# Patient Record
Sex: MALE | Race: WHITE | Employment: UNEMPLOYED | URBAN - METROPOLITAN AREA
[De-identification: names, ages, dates, MRNs, and addresses within clinical notes are randomized per-mention and may not be internally consistent; named-entity substitution may affect disease eponyms.]

---

## 2017-02-02 ENCOUNTER — TELEPHONE (OUTPATIENT)
Dept: FAMILY MEDICINE | Facility: CLINIC | Age: 62
End: 2017-02-02

## 2017-02-02 DIAGNOSIS — G47.33 OSA (OBSTRUCTIVE SLEEP APNEA): ICD-10-CM

## 2017-02-02 DIAGNOSIS — G25.81 RESTLESS LEGS SYNDROME (RLS): Primary | ICD-10-CM

## 2017-02-02 NOTE — TELEPHONE ENCOUNTER
Reason for Call: Request for an order or referral:    Order or referral being requested: CPAP supplies    Date needed: as soon as possible    Has the patient been seen by the PCP for this problem? YES    Additional comments: Please fax order to [facility] [phone]    Phone number Patient can be reached at:  Home number on file [phone] (home)    Best Time:  any    Can we leave a detailed message on this number?  YES    Call taken on 2/2/2017 at 3:32 PM by KELY SANDHU

## 2017-02-03 NOTE — TELEPHONE ENCOUNTER
CPAP SUPPLIES    ICD-10-CM    1. Restless legs syndrome (RLS) G25.81    2. DALTON (obstructive sleep apnea)- mild (AHI 5.9) G47.33

## 2017-02-06 ENCOUNTER — TELEPHONE (OUTPATIENT)
Dept: FAMILY MEDICINE | Facility: CLINIC | Age: 62
End: 2017-02-06

## 2017-02-06 ENCOUNTER — OFFICE VISIT (OUTPATIENT)
Dept: FAMILY MEDICINE | Facility: CLINIC | Age: 62
End: 2017-02-06
Payer: COMMERCIAL

## 2017-02-06 VITALS
SYSTOLIC BLOOD PRESSURE: 124 MMHG | BODY MASS INDEX: 35.19 KG/M2 | TEMPERATURE: 97.4 F | RESPIRATION RATE: 16 BRPM | HEIGHT: 65 IN | DIASTOLIC BLOOD PRESSURE: 74 MMHG | WEIGHT: 211.2 LBS | OXYGEN SATURATION: 98 % | HEART RATE: 98 BPM

## 2017-02-06 DIAGNOSIS — G47.33 OSA (OBSTRUCTIVE SLEEP APNEA): ICD-10-CM

## 2017-02-06 DIAGNOSIS — G89.4 CHRONIC PAIN SYNDROME: Primary | ICD-10-CM

## 2017-02-06 DIAGNOSIS — M50.30 DEGENERATION OF CERVICAL INTERVERTEBRAL DISC: Chronic | ICD-10-CM

## 2017-02-06 DIAGNOSIS — M25.562 CHRONIC PAIN OF BOTH KNEES: Chronic | ICD-10-CM

## 2017-02-06 DIAGNOSIS — E78.5 HYPERLIPIDEMIA WITH TARGET LDL LESS THAN 130: Chronic | ICD-10-CM

## 2017-02-06 DIAGNOSIS — F41.8 MIXED ANXIETY DEPRESSIVE DISORDER: ICD-10-CM

## 2017-02-06 DIAGNOSIS — M51.379 DEGENERATION OF LUMBAR OR LUMBOSACRAL INTERVERTEBRAL DISC: ICD-10-CM

## 2017-02-06 DIAGNOSIS — M25.561 CHRONIC PAIN OF BOTH KNEES: Chronic | ICD-10-CM

## 2017-02-06 DIAGNOSIS — F51.01 PRIMARY INSOMNIA: ICD-10-CM

## 2017-02-06 DIAGNOSIS — M54.2 CERVICALGIA: ICD-10-CM

## 2017-02-06 DIAGNOSIS — G89.29 CHRONIC PAIN OF BOTH KNEES: Chronic | ICD-10-CM

## 2017-02-06 PROCEDURE — 99214 OFFICE O/P EST MOD 30 MIN: CPT | Performed by: FAMILY MEDICINE

## 2017-02-06 RX ORDER — HYDROCODONE BITARTRATE AND ACETAMINOPHEN 7.5; 325 MG/1; MG/1
1 TABLET ORAL EVERY 4 HOURS PRN
Qty: 100 TABLET | Refills: 0 | Status: SHIPPED | OUTPATIENT
Start: 2017-02-06 | End: 2017-02-06

## 2017-02-06 RX ORDER — HYDROCODONE BITARTRATE AND ACETAMINOPHEN 7.5; 325 MG/1; MG/1
1 TABLET ORAL EVERY 4 HOURS PRN
Qty: 100 TABLET | Refills: 0 | Status: SHIPPED | OUTPATIENT
Start: 2017-03-06 | End: 2017-02-06

## 2017-02-06 RX ORDER — SAW/PYGEUM/BETA/HERB/D3/B6/ZN 30 MG-25MG
1 CAPSULE ORAL EVERY EVENING
Qty: 30 TABLET | Refills: 11 | Status: SHIPPED | OUTPATIENT
Start: 2017-02-06 | End: 2019-05-09

## 2017-02-06 RX ORDER — HYDROCODONE BITARTRATE AND ACETAMINOPHEN 7.5; 325 MG/1; MG/1
1 TABLET ORAL EVERY 4 HOURS PRN
Qty: 100 TABLET | Refills: 0 | Status: SHIPPED | OUTPATIENT
Start: 2017-04-06 | End: 2017-05-08

## 2017-02-06 RX ORDER — TRAZODONE HYDROCHLORIDE 50 MG/1
100 TABLET, FILM COATED ORAL
Qty: 60 TABLET | Refills: 11 | Status: SHIPPED | OUTPATIENT
Start: 2017-02-06 | End: 2017-08-06

## 2017-02-06 NOTE — PROGRESS NOTES
"  SUBJECTIVE:                                                    Claude D Feinstein is a 61 year old male who presents to clinic today for the following health issues:        Chronic Pain Follow-Up       Type / Location of Pain: back,   Analgesia/pain control:       Recent changes:  worse      Overall control: Tolerable with discomfort  Activity level/function:      Daily activities:  Can do most things most days, with some rest    Work:  not applicable  Adverse effects:  No  Adherance    Taking medication as directed?  Yes    Participating in other treatments: not applicable  Risk Factors:    Sleep:  Poor    Mood/anxiety:  controlled    Recent family or social stressors:  none noted    Other aggravating factors: none  PHQ-9 SCORE 11/3/2015 5/9/2016 11/7/2016   Total Score - - -   Total Score 0 0 0     RAKESH-7 SCORE 11/3/2015 11/7/2016   Total Score 0 0     Encounter-Level CSA - 9/8/15:               Controlled Substance Agreement - Scan on 9/16/2015 11:59 AM : [facility], Controlled Substance Contract, 09-08-15 (below)                 Amount of exercise or physical activity: 2-3 days/week for an average of 30-45 minutes, not going as often due to less pain meds    Problems taking medications regularly: No    Medication side effects: none    Diet: regular (no restrictions)    CERVICAL DISC DISEASE ONGOING    RESTLESS LEG SYNDROME IMPROVED     ITCHING IMPROVED     LUMBAR DISC SEVERE AT TIMES INTERMITTENT     CARPAL TUNNEL SYNDROME IMPROVED     OBESITY MARKEDLY IMPROVED 1400 CALORY DIET     OSTEOARTHRITIS KNEE AND TOTAL KNEE ARTHROPLASTY BILATERAL MARKED IMPROVED PAIN     OBSTRUCTIVE SLEEP APNEA ONGOING SYMPTOMS     DEPRESSION IMPROVED  ANXIETY IMPROVED     INSOMNIA IMPROVED     LDL OR \"BAD\" CHOLESTEROL  GOAL < 100 IMPROVED               Problem list and histories reviewed & adjusted, as indicated.  Additional history: as documented      Patient Active Problem List   Diagnosis     Degeneration of cervical intervertebral disc "     Restless legs syndrome (RLS)     Pruritic disorder     Degeneration of lumbar or lumbosacral intervertebral disc     Carpal tunnel syndrome     Obesity     External hemorrhoids     Primary localized osteoarthrosis, lower leg     Prurigo     Illiterate     DALTON (obstructive sleep apnea)- mild (AHI 5.9)     Hyperlipidemia with target LDL less than 130     Knee pain     Major depression, recurrent (H)     Major depressive disorder, recurrent episode, moderate (H)     Adjustment disorder with depressed mood     Osteoarthritis of knee     Neck pain     Lumbago     Major depression in partial remission (H)     Mixed anxiety depressive disorder     Advance Care Planning     Anxiety     Chronic pain syndrome     Other chronic pain     Midline low back pain with sciatica, sciatica laterality unspecified     Status post total bilateral knee replacement     Simple chronic bronchitis (H)     Psychophysiologic insomnia     Rash     Past Surgical History   Procedure Laterality Date     Foot surgery  1991     L bunionectomy     Knee surgery  1991     R Knee Arthroscopy     Ankle surgery  7/16/2007     synovectomy     Orthopedic surgery  1990     arm surgery laceration R forearm     Carpal tunnel release rt/lt       Head & neck surgery  1958     plate in head       Social History   Substance Use Topics     Smoking status: Former Smoker -- 1.00 packs/day     Types: Cigarettes     Quit date: 03/10/2015     Smokeless tobacco: Never Used     Alcohol Use: Yes     Family History   Problem Relation Age of Onset     Alzheimer Disease Mother          Current Outpatient Prescriptions   Medication Sig Dispense Refill     traZODone (DESYREL) 50 MG tablet Take 2 tablets (100 mg) by mouth nightly as needed for sleep 60 tablet 11     Melatonin (MELATONIN TR) 10 MG TBCR Take 1 tablet by mouth every evening 30 tablet 11     [START ON 4/6/2017] HYDROcodone-acetaminophen (NORCO) 7.5-325 MG per tablet Take 1 tablet by mouth every 4 hours as needed  for pain maximum 3-4 tablet(s) per day 100 tablet 0     lidocaine (ASPERCREME W/LIDOCAINE) 4 % CREA 4% topical cream Apply topically once as needed for mild pain 120 g 11     albuterol (2.5 MG/3ML) 0.083% nebulizer solution NEBULIZE AND INHALE 1 VIAL EVERY 6 HOURS AS NEEDED FOR SHORTNESS OF BREATH/DYSPNEA OR WHEEZING. 360 mL 1     albuterol (PROAIR HFA, PROVENTIL HFA, VENTOLIN HFA) 108 (90 BASE) MCG/ACT inhaler Inhale 2 puffs into the lungs every 6 hours as needed for shortness of breath / dyspnea or wheezing 1 Inhaler 11     cyclobenzaprine (FLEXERIL) 5 MG tablet Take 1 tablet (5 mg) by mouth 2 times daily as needed for muscle spasms 60 tablet 3     triamcinolone (KENALOG) 0.1 % cream APPLY SPARINGLY TO THE AFFECTED AREA THREE TIMES A DAY AS NEEDED. 30 g 0     ramelteon (ROZEREM) 8 MG tablet Take 1 tablet (8 mg) by mouth At Bedtime 30 tablet 11     tiotropium (SPIRIVA HANDIHALER) 18 MCG inhalation capsule Inhale contents of one capsule daily. 90 capsule 3     fluticasone (FLONASE) 50 MCG/ACT nasal spray INSTILL 1 TO 2 SPRAYS IN EACH NOSTRIL DAILY. 15 g 11     carbidopa-levodopa (SINEMET)  MG per tablet Take 1 tablet by mouth At Bedtime 90 tablet 3     hydrOXYzine (VISTARIL) 25 MG capsule Take 1 capsule (25 mg) by mouth 3 times daily as needed for itching 270 capsule 3     simvastatin (ZOCOR) 10 MG tablet Take 1 tablet (10 mg) by mouth At Bedtime 30 tablet 11     ketoconazole (NIZORAL) 2 % cream APPLY TOPICALLY TWICE DAILY. 30 g 2     Skin Protectants, Misc. (EUCERIN) cream Apply topically as needed for dry skin 454 g G     mineral oil-white petrolatum (MINERIN, EUCERIN) CREA Apply topically two times daily 454 g 11     ORDER FOR DME Equipment being ordered: LONGITUDINAL ARCH SUPPORTS   DIAGNOSIS PAINFUL ANKLES AND KNEES WITH OSTEOARTHRTIS ADVANCE  ONE PAIR USE AS DIRECTED 1 Device 1     ORDER FOR DME autocpap 6-10 cm       Cholecalciferol (VITAMIN D) 2000 UNITS CAPS Take 1 capsule by mouth daily.        Magnesium Hydroxide (MAGNESIA PO) Take  by mouth.       Saw Palmetto, Serenoa repens, (SAW PALMETTO FRUIT PO) Take  by mouth.       [DISCONTINUED] traZODone (DESYREL) 50 MG tablet Take 2 tablets (100 mg) by mouth nightly as needed for sleep 60 tablet 3     No Known Allergies  Recent Labs   Lab Test  08/01/16   1418  03/17/15   1333   06/11/13   1427  01/29/13   1211   05/25/12   1646   A1C  5.4   --    --   5.8   --    --    --    LDL  66   --    --   55  71   --   90.2   HDL  44   --    --   47  50   --   53   TRIG  163*   --    --   151*  121   --   189*   ALT  56   --    --    --   40   --   86.0*   CR  0.88  1.00   --   0.90  0.90   --   0.8   GFRESTIMATED  89  77   --   88  88   < >   --    GFRESTBLACK  >90  >90   --   >90  >90   < >   --    POTASSIUM  3.9  4.3   < >  4.1  4.0   --   4.3   TSH   --    --    --   2.73   --    --    --     < > = values in this interval not displayed.      BP Readings from Last 3 Encounters:   02/06/17 124/74   11/07/16 128/70   08/01/16 118/72    Wt Readings from Last 3 Encounters:   02/06/17 211 lb 3.2 oz (95.8 kg)   11/07/16 233 lb (105.688 kg)   08/01/16 248 lb (112.492 kg)                  Labs reviewed in EPIC  Problem list, Medication list, Allergies, and Medical/Social/Surgical histories reviewed in [facility] and updated as appropriate.    ROS: has Degeneration of cervical intervertebral disc; Restless legs syndrome (RLS); Pruritic disorder; Degeneration of lumbar or lumbosacral intervertebral disc; Carpal tunnel syndrome; Obesity; External hemorrhoids; Primary localized osteoarthrosis, lower leg; Prurigo; Illiterate; DALTON (obstructive sleep apnea)- mild (AHI 5.9); Hyperlipidemia with target LDL less than 130; Knee pain; Major depression, recurrent (H); Major depressive disorder, recurrent episode, moderate (H); Adjustment disorder with depressed mood; Osteoarthritis of knee; Neck pain; Lumbago; Major depression in partial remission (H); Mixed anxiety depressive disorder;  "Advance Care Planning; Anxiety; Chronic pain syndrome; Other chronic pain; Midline low back pain with sciatica, sciatica laterality unspecified; Status post total bilateral knee replacement; Simple chronic bronchitis (H); Psychophysiologic insomnia; and Rash on his problem list.   C: NEGATIVE for fever, chills, change in weight  I: NEGATIVE for worrisome rashes, moles or lesions  E: NEGATIVE for vision changes or irritation  E/M: NEGATIVE for ear, mouth and throat problems  R: NEGATIVE for significant cough or SOB  B: NEGATIVE for masses, tenderness or discharge  CV: NEGATIVE for chest pain, palpitations or peripheral edema  GI: NEGATIVE for nausea, abdominal pain, heartburn, or change in bowel habits  : NEGATIVE for frequency, dysuria, or hematuria  MUSCULOSKELETAL: SEE HISTORY OF PRESENT ILLNESS   KNEE PAIN IMPROVED   ONGOING BACK AND NECK PAIN  N: NEGATIVE for weakness, dizziness or paresthesias  E: NEGATIVE for temperature intolerance, skin/hair changes  H: NEGATIVE for bleeding problems  P: NEGATIVE for changes in mood or affect    OBJECTIVE:                                                    /74 mmHg  Pulse 98  Temp(Src) 97.4  F (36.3  C) (Tympanic)  Resp 16  Ht 5' 5\" (1.651 m)  Wt 211 lb 3.2 oz (95.8 kg)  BMI 35.15 kg/m2  SpO2 98%  Body mass index is 35.15 kg/(m^2).  GENERAL: healthy, alert and no distress  EYES: Eyes grossly normal to inspection, PERRL and conjunctivae and sclerae normal  HENT: ear canals and TM's normal, nose and mouth without ulcers or lesions  NECK: no adenopathy, no asymmetry, masses, or scars and thyroid normal to palpation  RESP: lungs clear to auscultation - no rales, rhonchi or wheezes  CV: regular rate and rhythm, normal S1 S2, no S3 or S4, no murmur, click or rub, no peripheral edema and peripheral pulses strong  ABDOMEN: soft, nontender, no hepatosplenomegaly, no masses and bowel sounds normal  MS: no gross musculoskeletal defects noted, no edema  NEURO: Normal " strength and tone, mentation intact and speech normal  BACK: no CVA tenderness, no paralumbar tenderness    Diagnostic Test Results:  Results for orders placed or performed in visit on 11/21/16   Fecal colorectal cancer screen FIT - Future (S+30)   Result Value Ref Range    Occult Blood Scn FIT Negative NEG        ASSESSMENT/PLAN:                                                          ICD-10-CM    1. Chronic pain syndrome G89.4    2. Primary insomnia F51.01 traZODone (DESYREL) 50 MG tablet     Melatonin (MELATONIN TR) 10 MG TBCR   3. Degeneration of cervical intervertebral disc M50.30    4. Hyperlipidemia with target LDL less than 130 E78.5    5. Chronic pain of both knees M25.561     M25.562     G89.29    6. DALTON (obstructive sleep apnea)- mild (AHI 5.9) G47.33    7. Mixed anxiety depressive disorder F41.8    8. Degeneration of lumbar or lumbosacral intervertebral disc M51.37 HYDROcodone-acetaminophen (NORCO) 7.5-325 MG per tablet     DISCONTINUED: HYDROcodone-acetaminophen (NORCO) 7.5-325 MG per tablet     DISCONTINUED: HYDROcodone-acetaminophen (NORCO) 7.5-325 MG per tablet     DISCONTINUED: HYDROcodone-acetaminophen (NORCO) 7.5-325 MG per tablet   9. Cervicalgia M54.2 HYDROcodone-acetaminophen (NORCO) 7.5-325 MG per tablet     DISCONTINUED: HYDROcodone-acetaminophen (NORCO) 7.5-325 MG per tablet     DISCONTINUED: HYDROcodone-acetaminophen (NORCO) 7.5-325 MG per tablet     DISCONTINUED: HYDROcodone-acetaminophen (NORCO) 7.5-325 MG per tablet       There are no Patient Instructions on file for this visit.    JEANNIE LO MD  [facility]

## 2017-02-06 NOTE — MR AVS SNAPSHOT
"              After Visit Summary   2/6/2017    Claude D [facility]    MRN: [id number]           Patient Information     Date Of Birth          1955        Visit Information        Provider Department      2/6/2017 1:00 PM Addison Franks MD [facility]        Today's Diagnoses     Chronic pain syndrome    -  1     Primary insomnia         Degeneration of cervical intervertebral disc         Hyperlipidemia with target LDL less than 130         Chronic pain of both knees         DALTON (obstructive sleep apnea)- mild (AHI 5.9)         Mixed anxiety depressive disorder            Follow-ups after your visit        Who to contact     If you have questions or need follow up information about today's clinic visit or your schedule please contact [facility] directly at [phone].  Normal or non-critical lab and imaging results will be communicated to you by [employer], letter or phone within 4 business days after the clinic has received the results. If you do not hear from us within 7 days, please contact the clinic through [employer] or phone. If you have a critical or abnormal lab result, we will notify you by phone as soon as possible.  Submit refill requests through [employer] or call your pharmacy and they will forward the refill request to us. Please allow 3 business days for your refill to be completed.          Additional Information About Your Visit        [employer] Information     [employer] lets you send messages to your doctor, view your test results, renew your prescriptions, schedule appointments and more. To sign up, go to [email or web address] . Click on \"Log in\" on the left side of the screen, which will take you to the Welcome page. Then click on \"Sign up Now\" on the right side of the page.     You will be asked to enter the access code listed below, as well as some personal information. Please follow the directions to create your " "username and password.     Your access code is: [email or web address]  Expires: 2017  1:33 PM     Your access code will  in 90 days. If you need help or a new code, please call your [facility] or [phone].        Care EveryWhere ID     This is your Care EveryWhere ID. This could be used by other organizations to access your [facility] medical records  [id number]        Your Vitals Were     Pulse Temperature Respirations Height BMI (Body Mass Index) Pulse Oximetry    98 97.4  F (36.3  C) (Tympanic) 16 5' 5\" (1.651 m) 35.15 kg/m2 98%       Blood Pressure from Last 3 Encounters:   17 124/74   16 128/70   16 118/72    Weight from Last 3 Encounters:   17 211 lb 3.2 oz (95.8 kg)   16 233 lb (105.688 kg)   16 248 lb (112.492 kg)              Today, you had the following     No orders found for display         Today's Medication Changes          These changes are accurate as of: 17  1:33 PM.  If you have any questions, ask your nurse or doctor.               Start taking these medicines.        Dose/Directions    Melatonin 10 MG Tbcr   Commonly known as:  MELATONIN TR   Used for:  Primary insomnia   Started by:  Addison Franks MD        Dose:  1 tablet   Take 1 tablet by mouth every evening   Quantity:  30 tablet   Refills:  11         Stop taking these medicines if you haven't already. Please contact your care team if you have questions.     HYDROcodone-acetaminophen 7.5-325 MG per tablet   Commonly known as:  NORCO   Stopped by:  Addison Franks MD           order for DME   Stopped by:  Addison Franks MD                Where to get your medicines      These medications were sent to [facility] - [place] - [address]  [address] [address], [place] [address]     Phone:  [phone]    - Melatonin 10 MG Tbcr  - traZODone 50 MG tablet             Primary Care Provider Office Phone # Fax #    " Addison Franks [phone]       [facility] [address]  [facility] [address]        Thank you!     Thank you for choosing [facility]  for your care. Our goal is always to provide you with excellent care. Hearing back from our patients is one way we can continue to improve our services. Please take a few minutes to complete the written survey that you may receive in the mail after your visit with us. Thank you!             Your Updated Medication List - Protect others around you: Learn how to safely use, store and throw away your medicines at www.disposemymeds.org.          This list is accurate as of: 2/6/17  1:33 PM.  Always use your most recent med list.                   Brand Name Dispense Instructions for use    * albuterol (2.5 MG/3ML) 0.083% neb solution     360 mL    NEBULIZE AND INHALE 1 VIAL EVERY 6 HOURS AS NEEDED FOR SHORTNESS OF BREATH/DYSPNEA OR WHEEZING.       * albuterol 108 (90 BASE) MCG/ACT Inhaler    PROAIR HFA/PROVENTIL HFA/VENTOLIN HFA    1 Inhaler    Inhale 2 puffs into the lungs every 6 hours as needed for shortness of breath / dyspnea or wheezing       carbidopa-levodopa  MG per tablet    SINEMET    90 tablet    Take 1 tablet by mouth At Bedtime       cyclobenzaprine 5 MG tablet    FLEXERIL    60 tablet    Take 1 tablet (5 mg) by mouth 2 times daily as needed for muscle spasms       fluticasone 50 MCG/ACT spray    FLONASE    15 g    INSTILL 1 TO 2 SPRAYS IN EACH NOSTRIL DAILY.       hydrOXYzine 25 MG capsule    VISTARIL    270 capsule    Take 1 capsule (25 mg) by mouth 3 times daily as needed for itching       ketoconazole 2 % cream    NIZORAL    30 g    APPLY TOPICALLY TWICE DAILY.       lidocaine 4 % Crea cream    ASPERCREME W/LIDOCAINE    120 g    Apply topically once as needed for mild pain       MAGNESIA PO      Take  by mouth.       Melatonin 10 MG Tbcr    MELATONIN TR    30 tablet    Take 1 tablet  by mouth every evening       * mineral oil-white petrolatum Crea cream     454 g    Apply topically two times daily       * eucerin cream     454 g    Apply topically as needed for dry skin       * order for DME      autocpap 6-10 cm       * order for DME     1 Device    Equipment being ordered: LONGITUDINAL ARCH SUPPORTS   DIAGNOSIS PAINFUL ANKLES AND KNEES WITH OSTEOARTHRTIS ADVANCE  ONE PAIR USE AS DIRECTED       ramelteon 8 MG tablet    ROZEREM    30 tablet    Take 1 tablet (8 mg) by mouth At Bedtime       SAW PALMETTO FRUIT PO      Take  by mouth.       simvastatin 10 MG tablet    ZOCOR    30 tablet    Take 1 tablet (10 mg) by mouth At Bedtime       tiotropium 18 MCG capsule    SPIRIVA HANDIHALER    90 capsule    Inhale contents of one capsule daily.       traZODone 50 MG tablet    DESYREL    60 tablet    Take 2 tablets (100 mg) by mouth nightly as needed for sleep       triamcinolone 0.1 % cream    KENALOG    30 g    APPLY SPARINGLY TO THE AFFECTED AREA THREE TIMES A DAY AS NEEDED.       vitamin D 2000 UNITS Caps      Take 1 capsule by mouth daily.       * Notice:  This list has 6 medication(s) that are the same as other medications prescribed for you. Read the directions carefully, and ask your doctor or other care provider to review them with you.

## 2017-02-06 NOTE — NURSING NOTE
"Chief Complaint   Patient presents with     Back Pain       Initial /74 mmHg  Pulse 98  Temp(Src) 97.4  F (36.3  C) (Tympanic)  Resp 16  Ht 5' 5\" (1.651 m)  Wt 211 lb 3.2 oz (95.8 kg)  BMI 35.15 kg/m2  SpO2 98% Estimated body mass index is 35.15 kg/(m^2) as calculated from the following:    Height as of this encounter: 5' 5\" (1.651 m).    Weight as of this encounter: 211 lb 3.2 oz (95.8 kg).  Medication Reconciliation: complete   Lisandra Simon CMA      "

## 2017-02-07 NOTE — PATIENT INSTRUCTIONS
(G89.4) Chronic pain syndrome  (primary encounter diagnosis)    Comment:      Plan:          (F51.01) Primary insomnia    Comment:      Plan: traZODone (DESYREL) 50 MG tablet, Melatonin           (MELATONIN TR) 10 MG TBCR                   (M50.30) Degeneration of cervical intervertebral disc    Comment:      Plan:          (E78.5) Hyperlipidemia with target LDL less than 130    Comment:      Plan:          (M25.561,  M25.562,  G89.29) Chronic pain of both knees    Comment:      Plan:          (G47.33) DALTON (obstructive sleep apnea)- mild (AHI 5.9)    Comment:      Plan:          (F41.8) Mixed anxiety depressive disorder    Comment:      Plan:          (M51.37) Degeneration of lumbar or lumbosacral intervertebral disc    Comment:      Plan: HYDROcodone-acetaminophen (NORCO) 7.5-325 MG           per tablet, DISCONTINUED:           HYDROcodone-acetaminophen (NORCO) 7.5-325 MG           per tablet, DISCONTINUED:           HYDROcodone-acetaminophen (NORCO) 7.5-325 MG           per tablet, DISCONTINUED:               (M54.2) Cervicalgia    Comment:  SEE ABOVE    Plan ROM EXERCISES    NECK AND LOWER BACK PAIN

## 2017-04-10 ENCOUNTER — MEDICAL CORRESPONDENCE (OUTPATIENT)
Dept: HEALTH INFORMATION MANAGEMENT | Facility: CLINIC | Age: 62
End: 2017-04-10

## 2017-04-12 DIAGNOSIS — G89.29 CHRONIC LEFT-SIDED LOW BACK PAIN WITHOUT SCIATICA: ICD-10-CM

## 2017-04-12 DIAGNOSIS — M54.50 CHRONIC LEFT-SIDED LOW BACK PAIN WITHOUT SCIATICA: ICD-10-CM

## 2017-04-12 NOTE — TELEPHONE ENCOUNTER
cyclobenzaprine (FLEXERIL) 5 MG tablet      Last Written Prescription Date:  11/7/16  Last Fill Quantity: 60,   # refills: 3  Last Office Visit with [facility], [facility] or [facility] prescribing provider: 2/6/17  Future Office visit:    Next 5 appointments (look out 90 days)     May 08, 2017  1:00 PM CDT   [facility]    [address] [place] [id number] [phone]                   Routing refill request to provider for review/approval because:  Drug not on the [facility], [facility] or [facility] refill protocol or controlled substance

## 2017-04-13 RX ORDER — CYCLOBENZAPRINE HCL 5 MG
TABLET ORAL
Qty: 60 TABLET | Refills: 3 | Status: SHIPPED | OUTPATIENT
Start: 2017-04-13 | End: 2017-10-19

## 2017-05-08 ENCOUNTER — OFFICE VISIT (OUTPATIENT)
Dept: FAMILY MEDICINE | Facility: CLINIC | Age: 62
End: 2017-05-08
Payer: COMMERCIAL

## 2017-05-08 ENCOUNTER — TELEPHONE (OUTPATIENT)
Dept: FAMILY MEDICINE | Facility: CLINIC | Age: 62
End: 2017-05-08

## 2017-05-08 VITALS
BODY MASS INDEX: 30.37 KG/M2 | RESPIRATION RATE: 16 BRPM | SYSTOLIC BLOOD PRESSURE: 124 MMHG | WEIGHT: 182.5 LBS | HEART RATE: 112 BPM | OXYGEN SATURATION: 95 % | TEMPERATURE: 97.3 F | DIASTOLIC BLOOD PRESSURE: 64 MMHG

## 2017-05-08 DIAGNOSIS — M54.9 UPPER BACK PAIN: ICD-10-CM

## 2017-05-08 DIAGNOSIS — M54.2 CERVICALGIA: ICD-10-CM

## 2017-05-08 DIAGNOSIS — M50.30 DEGENERATION OF CERVICAL INTERVERTEBRAL DISC: Chronic | ICD-10-CM

## 2017-05-08 DIAGNOSIS — Z96.653 STATUS POST TOTAL BILATERAL KNEE REPLACEMENT: Primary | ICD-10-CM

## 2017-05-08 DIAGNOSIS — M51.379 DEGENERATION OF LUMBAR OR LUMBOSACRAL INTERVERTEBRAL DISC: ICD-10-CM

## 2017-05-08 DIAGNOSIS — G89.4 CHRONIC PAIN SYNDROME: ICD-10-CM

## 2017-05-08 PROCEDURE — 99000 SPECIMEN HANDLING OFFICE-LAB: CPT | Performed by: FAMILY MEDICINE

## 2017-05-08 PROCEDURE — 99214 OFFICE O/P EST MOD 30 MIN: CPT | Performed by: FAMILY MEDICINE

## 2017-05-08 PROCEDURE — 80307 DRUG TEST PRSMV CHEM ANLYZR: CPT | Mod: 90 | Performed by: FAMILY MEDICINE

## 2017-05-08 RX ORDER — HYDROCODONE BITARTRATE AND ACETAMINOPHEN 7.5; 325 MG/1; MG/1
1 TABLET ORAL EVERY 4 HOURS PRN
Qty: 100 TABLET | Refills: 0 | Status: SHIPPED | OUTPATIENT
Start: 2017-06-08 | End: 2017-05-08

## 2017-05-08 RX ORDER — HYDROCODONE BITARTRATE AND ACETAMINOPHEN 7.5; 325 MG/1; MG/1
1 TABLET ORAL EVERY 4 HOURS PRN
Qty: 100 TABLET | Refills: 0 | Status: SHIPPED | OUTPATIENT
Start: 2017-07-08 | End: 2017-08-07

## 2017-05-08 RX ORDER — HYDROCODONE BITARTRATE AND ACETAMINOPHEN 7.5; 325 MG/1; MG/1
1 TABLET ORAL EVERY 4 HOURS PRN
Qty: 100 TABLET | Refills: 0 | Status: SHIPPED | OUTPATIENT
Start: 2017-05-08 | End: 2017-05-08

## 2017-05-08 NOTE — LETTER
[address]  [address]  [facility] [address]  [phone]                                                                                                           Claude D [facility]  [address] [place] [address]    May 15, 2017      Dear Claude,    The results of your recent tests were reviewed and are enclosed.     Results for orders placed or performed in visit on 05/08/17   Pain Drug Scr UR W Rptd Meds   Result Value Ref Range    Pain Drug SCR UR W RPTD Meds       FINAL  (Note)  ====================================================================  TOXASSURE COMP DRUG ANALYSIS,UR  ====================================================================  Test                             Result       Flag       Units    Drug Present   Carboxy-THC                    7                       ng/mg creat    Carboxy-THC is a metabolite of tetrahydrocannabinol  (THC).    Source of THC is most commonly illicit, but THC is also present    in a scheduled prescription medication.     Hydrocodone                    1425                    ng/mg creat   Hydromorphone                  270                     ng/mg creat   Dihydrocodeine                 106                     ng/mg creat   Norhydrocodone                 2140                    ng/mg creat    Sources of hydrocodone include scheduled prescription    medications. Hydromorphone, dihydrocodeine and norhydrocodone are    expected metabolites of hydrocodone. Hydromorphone and    dihydrocodeine are also av ailable as scheduled prescription    medications.     Cyclobenzaprine                PRESENT   Trazodone                      PRESENT   1,3 chlorophenyl piperazine    PRESENT    1,3-chlorophenyl piperazine is an expected metabolite of    trazodone.     Acetaminophen                  PRESENT   Hydroxyzine                     PRESENT  ====================================================================  Test                      Result    Flag   Units      Ref Range   Creatinine              151              mg/dL      >=20  ====================================================================  Declared Medications:  Medication list was not provided.  ====================================================================  For clinical consultation, please call [phone].  ====================================================================  Analysis performed by [facility] [place] [address]       TRACE AMOUNT OF MARIJUANA ON URINE ANALYSIS.   REST OF MEDICATIONS PRESENT AS EXPECTED     Thank you for choosing [facility].  We appreciate the opportunity to serve you and look forward to supporting your healthcare needs in the future.    If you have any questions or concerns, please call me or my staff at [phone].      Sincerely,    Addison Franks Jr MD

## 2017-05-08 NOTE — TELEPHONE ENCOUNTER
Patient said during the appointment that Dr. Franks mentioned a medication for concentration for the patient. He is calling to ask what that is and if Dr. Franks plans on prescribing anything at this time.

## 2017-05-08 NOTE — TELEPHONE ENCOUNTER
Reason for Call:  Other prescription  Detailed comments: please call patient, has question about his visit today 05/08/2017  Phone Number Patient can be reached at: Home number on file [phone] (home)  Best Time: any  Can we leave a detailed message on this number? YES  Call taken on 5/8/2017 at 3:15 PM by JB DAMON

## 2017-05-08 NOTE — LETTER
My Depression Action Plan  Name: Claude D Feinstein   Date of Birth 1955  Date: 5/8/2017    My doctor: Addison Franks   My clinic: [address]  [facility] [id number] [phone]          GREEN    ZONE   Good Control    What it looks like:     Things are going generally well. You have normal up s and down s. You may even feel depressed from time to time, but bad moods usually last less than a day.   What you need to do:  1. Continue to care for yourself (see self care plan)  2. Check your depression survival kit and update it as needed  3. Follow your physician s recommendations including any medication.  4. Do not stop taking medication unless you consult with your physician first.           YELLOW         ZONE Getting Worse    What it looks like:     Depression is starting to interfere with your life.     It may be hard to get out of bed; you may be starting to isolate yourself from others.    Symptoms of depression are starting to last most all day and this has happened for several days.     You may have suicidal thoughts but they are not constant.   What you need to do:     1. Call your care team, your response to treatment will improve if you keep your care team informed of your progress. Yellow periods are signs an adjustment may need to be made.     2. Continue your self-care, even if you have to fake it!    3. Talk to someone in your support network    4. Open up your depression survival kit           RED    ZONE Medical Alert - Get Help    What it looks like:     Depression is seriously interfering with your life.     You may experience these or other symptoms: You can t get out of bed most days, can t work or engage in other necessary activities, you have trouble taking care of basic hygiene, or basic responsibilities, thoughts of suicide or death that will not go away, self-injurious behavior.     What you  need to do:  1. Call your care team and request a same-day appointment. If they are not available (weekends or after hours) call your local crisis line, emergency room or 911.      Electronically signed by: Lisandra Simon, May 8, 2017    Depression Self Care Plan / Survival Kit    Self-Care for Depression  Here s the deal. Your body and mind are really not as separate as most people think.  What you do and think affects how you feel and how you feel influences what you do and think. This means if you do things that people who feel good do, it will help you feel better.  Sometimes this is all it takes.  There is also a place for medication and therapy depending on how severe your depression is, so be sure to consult with your medical provider and/ or Behavioral Health Consultant if your symptoms are worsening or not improving.     In order to better manage my stress, I will:    Exercise  Get some form of exercise, every day. This will help reduce pain and release endorphins, the  feel good  chemicals in your brain. This is almost as good as taking antidepressants!  This is not the same as joining a gym and then never going! (they count on that by the way ) It can be as simple as just going for a walk or doing some gardening, anything that will get you moving.      Hygiene   Maintain good hygiene (Get out of bed in the morning, Make your bed, Brush your teeth, Take a shower, and Get dressed like you were going to work, even if you are unemployed).  If your clothes don't fit try to get ones that do.    Diet  I will strive to eat foods that are good for me, drink plenty of water, and avoid excessive sugar, caffeine, alcohol, and other mood-altering substances.  Some foods that are helpful in depression are: complex carbohydrates, B vitamins, flaxseed, fish or fish oil, fresh fruits and vegetables.    Psychotherapy  I agree to participate in Individual Therapy (if recommended).    Medication  If prescribed  medications, I agree to take them.  Missing doses can result in serious side effects.  I understand that drinking alcohol, or other illicit drug use, may cause potential side effects.  I will not stop my medication abruptly without first discussing it with my provider.    Staying Connected With Others  I will stay in touch with my friends, family members, and my primary care provider/team.    Use your imagination  Be creative.  We all have a creative side; it doesn t matter if it s oil painting, sand castles, or mud pies! This will also kick up the endorphins.    Witness Beauty  (AKA stop and smell the roses) Take a look outside, even in mid-winter. Notice colors, textures. Watch the squirrels and birds.     Service to others  Be of service to others.  There is always someone else in need.  By helping others we can  get out of ourselves  and remember the really important things.  This also provides opportunities for practicing all the other parts of the program.    Humor  Laugh and be silly!  Adjust your TV habits for less news and crime-drama and more comedy.    Control your stress  Try breathing deep, massage therapy, biofeedback, and meditation. Find time to relax each day.     My support system    Clinic Contact:  Phone number:    Contact 1:  Phone number:    Contact 2:  Phone number:    Restorationist/:  Phone number:    Therapist:  Phone number:    [facility] crisis center:    Phone number:    Other community support:  Phone number:

## 2017-05-08 NOTE — PROGRESS NOTES
SUBJECTIVE:                                                    Claude D Feinstein is a 61 year old male who presents to clinic today for the following health issues:  Health Maintenance Due   Topic Date Due     URINE DRUG SCREEN Q1 YR  [birth year]02/1970     ASTHMA CONTROL TEST Q6 MOS (NO INBASKET)  05/07/2017     PHQ-9 Q6 MONTHS (NO INBASKET)  05/07/2017     DEPRESSION ACTION PLAN Q1 YR (NO INBASKET)  05/09/2017     Health Maintenance reviewed at today's visit patient asked to schedule/complete:   Asthma:  Completed at today's visit.  Depression:  Completed at today's visit.        Chronic Pain Follow-Up       Type / Location of Pain: lower back, upper back, shoulder blades  Analgesia/pain control:       Recent changes:  same      Overall control: Comfortably manageable  Activity level/function:      Daily activities:  Able to do all daily activities    Work:  not applicable  Adverse effects:  No  Adherance    Taking medication as directed?  Yes    Participating in other treatments: not applicable  Risk Factors:    Sleep:  Poor    Mood/anxiety:  controlled    Recent family or social stressors:  none noted    Other aggravating factors: none  PHQ-9 SCORE 11/3/2015 5/9/2016 11/7/2016   Total Score - - -   Total Score 0 0 0     RAKESH-7 SCORE 11/3/2015 11/7/2016   Total Score 0 0     Encounter-Level CSA - 09/08/2015:                 Controlled Substance Agreement - Scan on 9/16/2015 11:59 AM : [facility], Controlled Substance Contract, 09-08-15 (below)                 Amount of exercise or physical activity: 6-7 days/week for an average of 15-30 minutes    Problems taking medications regularly: No    Medication side effects: none    Diet: calorie counting    .  Current Outpatient Prescriptions   Medication Sig Dispense Refill     [START ON 7/8/2017] HYDROcodone-acetaminophen (NORCO) 7.5-325 MG per tablet Take 1 tablet by mouth every 4 hours as needed for pain maximum 3-4 tablet(s) per day 100 tablet 0     cyclobenzaprine (FLEXERIL) 5  MG tablet TAKE 1 TABLET BY MOUTH TWICE DAILY AS NEEDED FOR MUSCLE SPASMS. 60 tablet 3     traZODone (DESYREL) 50 MG tablet Take 2 tablets (100 mg) by mouth nightly as needed for sleep 60 tablet 11     Melatonin (MELATONIN TR) 10 MG TBCR Take 1 tablet by mouth every evening 30 tablet 11     albuterol (2.5 MG/3ML) 0.083% nebulizer solution NEBULIZE AND INHALE 1 VIAL EVERY 6 HOURS AS NEEDED FOR SHORTNESS OF BREATH/DYSPNEA OR WHEEZING. 360 mL 1     albuterol (PROAIR HFA, PROVENTIL HFA, VENTOLIN HFA) 108 (90 BASE) MCG/ACT inhaler Inhale 2 puffs into the lungs every 6 hours as needed for shortness of breath / dyspnea or wheezing 1 Inhaler 11     triamcinolone (KENALOG) 0.1 % cream APPLY SPARINGLY TO THE AFFECTED AREA THREE TIMES A DAY AS NEEDED. 30 g 0     ramelteon (ROZEREM) 8 MG tablet Take 1 tablet (8 mg) by mouth At Bedtime 30 tablet 11     tiotropium (SPIRIVA HANDIHALER) 18 MCG inhalation capsule Inhale contents of one capsule daily. 90 capsule 3     fluticasone (FLONASE) 50 MCG/ACT nasal spray INSTILL 1 TO 2 SPRAYS IN EACH NOSTRIL DAILY. 15 g 11     carbidopa-levodopa (SINEMET)  MG per tablet Take 1 tablet by mouth At Bedtime 90 tablet 3     hydrOXYzine (VISTARIL) 25 MG capsule Take 1 capsule (25 mg) by mouth 3 times daily as needed for itching 270 capsule 3     simvastatin (ZOCOR) 10 MG tablet Take 1 tablet (10 mg) by mouth At Bedtime 30 tablet 11     ketoconazole (NIZORAL) 2 % cream APPLY TOPICALLY TWICE DAILY. 30 g 2     Skin Protectants, Misc. (EUCERIN) cream Apply topically as needed for dry skin 454 g G     mineral oil-white petrolatum (MINERIN, EUCERIN) CREA Apply topically two times daily 454 g 11     ORDER FOR DME Equipment being ordered: LONGITUDINAL ARCH SUPPORTS   DIAGNOSIS PAINFUL ANKLES AND KNEES WITH OSTEOARTHRTIS ADVANCE  ONE PAIR USE AS DIRECTED 1 Device 1     ORDER FOR DME autocpap 6-10 cm       Cholecalciferol (VITAMIN D) 2000 UNITS CAPS Take 1 capsule by mouth daily.       Magnesium  Hydroxide (MAGNESIA PO) Take  by mouth.       Saw Palmetto, Serenoa repens, (SAW PALMETTO FRUIT PO) Take  by mouth.       lidocaine (ASPERCREME W/LIDOCAINE) 4 % CREA 4% topical cream Apply topically once as needed for mild pain (Patient not taking: Reported on 2017) 120 g 11        No Known Allergies    Immunization History   Administered Date(s) Administered     Influenza (H1N1) 2010     Influenza (IIV3) 09/15/2010, 2011, 10/16/2012, 2013     Influenza Vaccine IM 3yrs+ 4 Valent IIV4 2014     Pneumococcal 23 valent 2012     Tdap (Adacel,Boostrix) 2007         reports that he drinks alcohol.      reports that he uses illicit drugs.    family history includes Alzheimer Disease in his mother.    indicated that his mother is . He indicated that his father is .      has a past surgical history that includes Foot surgery (); knee surgery (); Ankle surgery (2007); orthopedic surgery (); carpal tunnel release rt/lt; and Head and neck surgery ().     reports that he does not engage in sexual activity.  .  Pediatric History   Patient Guardian Status     Not on file.     Other Topics Concern     Parent/Sibling W/ Cabg, Mi Or Angioplasty Before 65f 55m? No     Social History Narrative         reports that he has been smoking Cigarettes.  He has been smoking about 1.00 pack per day. He has never used smokeless tobacco.    Medical, social, surgical, and family histories reviewed.    Problem list, Medication list, Allergies, and Medical/Social/Surgical histories reviewed in [facility] and updated as appropriate.  Labs reviewed in EPIC  Patient Active Problem List   Diagnosis     Degeneration of cervical intervertebral disc     Restless legs syndrome (RLS)     Pruritic disorder     Degeneration of lumbar or lumbosacral intervertebral disc     Carpal tunnel syndrome     Obesity     External hemorrhoids     Primary localized osteoarthrosis, lower leg     Prurigo      Illiterate     DALTON (obstructive sleep apnea)- mild (AHI 5.9)     Hyperlipidemia with target LDL less than 130     Knee pain     Major depression, recurrent (H)     Major depressive disorder, recurrent episode, moderate (H)     Adjustment disorder with depressed mood     Neck pain     Major depression in partial remission (H)     Mixed anxiety depressive disorder     Advance Care Planning     Anxiety     Chronic pain syndrome     Other chronic pain     Midline low back pain with sciatica, sciatica laterality unspecified     Status post total bilateral knee replacement     Simple chronic bronchitis (H)     Psychophysiologic insomnia     Rash     Past Surgical History:   Procedure Laterality Date     ANKLE SURGERY  7/16/2007    synovectomy     CARPAL TUNNEL RELEASE RT/LT       FOOT SURGERY  1991    L bunionectomy     HEAD & NECK SURGERY  1958    plate in head     KNEE SURGERY  1991    R Knee Arthroscopy     ORTHOPEDIC SURGERY  1990    arm surgery laceration R forearm       Social History   Substance Use Topics     Smoking status: Current Every Day Smoker     Packs/day: 1.00     Types: Cigarettes     Last attempt to quit: 3/10/2015     Smokeless tobacco: Never Used     Alcohol use Yes     Family History   Problem Relation Age of Onset     Alzheimer Disease Mother          No Known Allergies  Recent Labs   Lab Test  08/01/16   1418  03/17/15   1333   06/11/13   1427  01/29/13   1211   05/25/12   1646   A1C  5.4   --    --   5.8   --    --    --    LDL  66   --    --   55  71   --   90.2   HDL  44   --    --   47  50   --   53   TRIG  163*   --    --   151*  121   --   189*   ALT  56   --    --    --   40   --   86.0*   CR  0.88  1.00   --   0.90  0.90   --   0.8   GFRESTIMATED  89  77   --   88  88   < >   --    GFRESTBLACK  >90  >90   --   >90  >90   < >   --    POTASSIUM  3.9  4.3   < >  4.1  4.0   --   4.3   TSH   --    --    --   2.73   --    --    --     < > = values in this interval not displayed.        BP  Readings from Last 6 Encounters:   05/08/17 124/64   02/06/17 124/74   11/07/16 128/70   08/01/16 118/72   05/09/16 118/74   03/08/16 122/78       Wt Readings from Last 3 Encounters:   05/08/17 182 lb 8 oz (82.8 kg)   02/06/17 211 lb 3.2 oz (95.8 kg)   11/07/16 233 lb (105.7 kg)         Positive symptoms or findings indicated by bold designation:     ROS: 10 point ROS neg other than the symptoms noted above in the HPI.except  has Degeneration of cervical intervertebral disc; Restless legs syndrome (RLS); Pruritic disorder; Degeneration of lumbar or lumbosacral intervertebral disc; Carpal tunnel syndrome; Obesity; External hemorrhoids; Primary localized osteoarthrosis, lower leg; Prurigo; Illiterate; DALTON (obstructive sleep apnea)- mild (AHI 5.9); Hyperlipidemia with target LDL less than 130; Knee pain; Major depression, recurrent (H); Major depressive disorder, recurrent episode, moderate (H); Adjustment disorder with depressed mood; Neck pain; Major depression in partial remission (H); Mixed anxiety depressive disorder; Advance Care Planning; Anxiety; Chronic pain syndrome; Other chronic pain; Midline low back pain with sciatica, sciatica laterality unspecified; Status post total bilateral knee replacement; Simple chronic bronchitis (H); Psychophysiologic insomnia; and Rash on his problem list.   Constitutional: The patient denied fatigue, fever, insomnia, night sweats, recent illness and weight loss.  IMPROVED WEIGHT TO BORDERLINE  OBESITY ON 1400 CALORY DIET     Eyes: The patient denied blindness, eye pain, eye tearing, photophobia, vision change and visual disturbance. IMPROVED VISION       Ears/Nose/Throat/Neck: The patient denied dizziness, facial pain, hearing loss, nasal discharge, oral pain, otalgia, postnasal drip, sinus congestion, sore throat, tinnitus and voice change.   NORMAL HEARING AND BALANCE     Cardiovascular: The patient denied arrhythmia, chest pain/pressure, claudication, edema, exercise  intolerance, fatigue, orthopnea, palpitations and syncope.  ASYMPTOMATIC     Respiratory: The patient denied asthma, chest congestion, cough, dyspnea on exertion, dyspnea/shortness of breath, hemoptysis, pedal edema, pleuritic pain, productive sputum, snoring and wheezing. SMOKING A BIT     Gastrointestinal: The patient denied abdominal pain, anorexia, constipation, diarrhea, dysphagia, gastroesophageal reflux, hematochezia, hemorrhoids, melena, nausea and vomiting . IMPROVED     Genitourinary/Nephrology: The patient denied breast complaint, dysuria, nocturia sexual dysfunction, t, urinary frequency, urinary incontinence, urinary urgency        Musculoskeletal:  SLIGHT DECREASE RANGE OF MOTION OF NECK   DECREASE RANGE OF MOTION OF BACK   NEGATIVE STRAIGHT LEG RAISING TEST   TOTAL KNEE ARTHROPLASTY BILATERAL   ANKLE PAIN AND SWELLING IMPROVED     Dermatoligic:: The patient denied acne, dermatitis, ecchymosis, itching, mole change, rash, skin cancer, skin lesion and sores.  RECENT LICHEN SIMPLEX CHRONICUS ON LOWER LEG IMPROVED     Neurologic: The patient denied dizziness, gait abnormality, headache, memory loss, mental status change, paresis, paresthesia, seizure, syncope, tremor and vision change.     Psychiatric: The patient denied anxiety, depression, disturbances of memory, drug abuse, insomnia, mood swings and relationship difficulties.  HISTORY OF MARIJUANA AND ALCOHOL USAGE   RARE AT PRESENT   NO MARIJUANA X 50 DAYS     Endocrine: The patient denied , goiter, obesity, polyuria and thyroid disease.  OBESITY IMPROVED     Hematologic/Lymphatic: The patient denied abnormal bleeding and bruising, abnormal ecchymoses, anemia, lymph node enlargement/mass, petechiae and venous  Thrombosis.      Allergy/Immunology: The patient denied food allergy and  Allergic rhinitis or conjunctivitis.        PE:  /64  Pulse 112  Temp 97.3  F (36.3  C) (Tympanic)  Resp 16  Wt 182 lb 8 oz (82.8 kg)  SpO2 95%  BMI 30.37  kg/m2 Body mass index is 30.37 kg/(m^2).    Constitutional: general appearance, well nourished, well developed, in no acute distress, well developed, appears stated age, normal body habitus,      Eyes:; The patient has normal eyelids sclerae and conjunctivae :      Ears/Nose/Throat: external ear, overall: normal appearance; external nose, overall: benign appearance, normal moujth gums and lips  The patient has:      Neck: thyroid, overall: normal size, normal consistency, nontender,      Respiratory:  palpation of chest, overall: normal excursion,    Clear to percussion and auscultation      Tachypnea   Color  WITHIN NORMAL LIMITS      Cardiovascular:  Good color with no peripheral edema  NORMAL     Regular sinus rhythm without murmur. Physiologic heart sounds Heart is unelarged  .   Chest/Breast: normal shape  NORMAL      Abdominal exam,  Liver and spleen are  unenlarged  NORMAL       Tenderness  NORMAL   Scars  NORMAL     Musculoskeletal:  Brief ortho exam normal except:   DECREASE RANGE OF MOTION OF BACK AND NECK   UPPER EXTREMETIES WITHIN NORMAL LIMITS   LOWER EXTREMITY TOTAL KNEE ARTHROPLASTY BILATERAL   NEGATIVE STRAIGHT LEG RAISING TEST     Integument: inspection of skin, no rash, lesions; and, palpation, no induration, no tenderness.      Neurologic mental status, overall: alert and oriented; gait, no ataxia, no unsteadiness; coordination, no tremors; cranial nerves, overall: normal motor, overall: normal bulk, tone.      Psychiatric: orientation/consciousness, overall: oriented to person, place and time; behavior/psychomotor activity, no tics, normal psychomotor activity; mood and affect, overall: normal mood and affect; appearance, overall: well-groomed, good eye contact; speech, overall: normal quality, no aphasia and normal quality, quantity, intact.      Diagnostic Test Results:  Results for orders placed or performed in visit on 11/21/16   Fecal colorectal cancer screen FIT - Future (S+30)   Result  Value Ref Range    Occult Blood Scn FIT Negative NEG         ICD-10-CM    1. Status post total bilateral knee replacement Z96.653    2. Degeneration of cervical intervertebral disc M50.30    3. Upper back pain M54.9    4. Degeneration of lumbar or lumbosacral intervertebral disc M51.37 HYDROcodone-acetaminophen (NORCO) 7.5-325 MG per tablet     DISCONTINUED: HYDROcodone-acetaminophen (NORCO) 7.5-325 MG per tablet     DISCONTINUED: HYDROcodone-acetaminophen (NORCO) 7.5-325 MG per tablet   5. Chronic pain syndrome G89.4 Pain Drug Scr UR W Rptd Meds   6. Cervicalgia M54.2 HYDROcodone-acetaminophen (NORCO) 7.5-325 MG per tablet     DISCONTINUED: HYDROcodone-acetaminophen (NORCO) 7.5-325 MG per tablet     DISCONTINUED: HYDROcodone-acetaminophen (NORCO) 7.5-325 MG per tablet        .    Side effects benefits and risks thoroughly discussed. .he may come in early if unimproved or getting worse          Importance of adhering to regimen discussed and if medications were dispensed, the importance of taking medications discussed and bringing in the medications after every visit for chronic problems         Please drink 2 glasses of water prior to meals and walk 15-30 minutes after meals    I spent 25 MINUTES SPENT  with patient discussing the following issues    The primary encounter diagnosis was Status post total bilateral knee replacement. Diagnoses of Degeneration of cervical intervertebral disc, Upper back pain, Degeneration of lumbar or lumbosacral intervertebral disc, Chronic pain syndrome, and Cervicalgia were also pertinent to this visit. over half of which involved counseling and coordination of care.    Patient Instructions   (Z96.653) Status post total bilateral knee replacement  (primary encounter diagnosis)  Comment:    Plan:      (M50.30) Degeneration of cervical intervertebral disc  Comment:    Plan:      (M54.9) Upper back pain  Comment:    Plan:      (M51.37) Degeneration of lumbar or lumbosacral  intervertebral disc  Comment:    Plan: HYDROcodone-acetaminophen (NORCO) 7.5-325 MG         per tablet, DISCONTINUED:         HYDROcodone-acetaminophen (NORCO) 7.5-325 MG         per tablet, DISCONTINUED:         HYDROcodone-acetaminophen (NORCO) 7.5-325 MG         per tablet             (G89.4) Chronic pain syndrome  Comment:    Plan: Pain Drug Scr UR W Rptd Meds             (M54.2) Cervicalgia  Comment:    Plan: HYDROcodone-acetaminophen (NORCO) 7.5-325 MG         per tablet, DISCONTINUED:         HYDROcodone-acetaminophen (NORCO) 7.5-325 MG         per tablet, DISCONTINUED:         HYDROcodone-acetaminophen (NORCO) 7.5-325 MG         per tablet                        ALL THE ABOVE PROBLEMS ARE STABLE AND MED CHANGES AS NOTED      Diet: MEDITERRANEAN DIET 1400 CALORY DIET    Exercise:  AEROBIC NECK AND LOWER BACK PAIN IMPROVED KNEE PAIN AND ANKLE PAIN FOR TOTAL KNEE ARTHROPLASTY   Exercises Range of motion, balance, isometric, and strengthening exercises 30 repetitions twice daily of involved joints      .JEANNIE LO MD 5/8/2017 1:51 PM  May 8, 2017

## 2017-05-08 NOTE — PATIENT INSTRUCTIONS
(Z96.653) Status post total bilateral knee replacement  (primary encounter diagnosis)  Comment:    Plan:      (M50.30) Degeneration of cervical intervertebral disc  Comment:    Plan:      (M54.9) Upper back pain  Comment:    Plan:      (M51.37) Degeneration of lumbar or lumbosacral intervertebral disc  Comment:    Plan: HYDROcodone-acetaminophen (NORCO) 7.5-325 MG         per tablet, DISCONTINUED:         HYDROcodone-acetaminophen (NORCO) 7.5-325 MG         per tablet, DISCONTINUED:         HYDROcodone-acetaminophen (NORCO) 7.5-325 MG         per tablet             (G89.4) Chronic pain syndrome  Comment:    Plan: Pain Drug Scr UR W Rptd Meds             (M54.2) Cervicalgia  Comment:    Plan: HYDROcodone-acetaminophen (NORCO) 7.5-325 MG         per tablet, DISCONTINUED:         HYDROcodone-acetaminophen (NORCO) 7.5-325 MG         per tablet, DISCONTINUED:         HYDROcodone-acetaminophen (NORCO) 7.5-325 MG         per tablet

## 2017-05-08 NOTE — NURSING NOTE
"Chief Complaint   Patient presents with     Pain       Initial /64  Pulse 112  Temp 97.3  F (36.3  C) (Tympanic)  Resp 16  Wt 182 lb 8 oz (82.8 kg)  SpO2 95%  BMI 30.37 kg/m2 Estimated body mass index is 30.37 kg/(m^2) as calculated from the following:    Height as of 2/6/17: 5' 5\" (1.651 m).    Weight as of this encounter: 182 lb 8 oz (82.8 kg).  Medication Reconciliation: complete   Lisandra Simon CMA    "

## 2017-05-08 NOTE — MR AVS SNAPSHOT
After Visit Summary   5/8/2017    Claude D [facility]ly [facility], [facility]    MRN: [id number]           Patient Information     Date Of Birth          1955        Visit Information        Provider Department      5/8/2017 1:00 PM Addison Franks MD [facility]        Today's Diagnoses     Status post total bilateral knee replacement    -  1    Degeneration of cervical intervertebral disc        Upper back pain        Degeneration of lumbar or lumbosacral intervertebral disc        Chronic pain syndrome        Cervicalgia          Care Instructions    (Z96.653) Status post total bilateral knee replacement  (primary encounter diagnosis)  Comment:    Plan:      (M50.30) Degeneration of cervical intervertebral disc  Comment:    Plan:      (M54.9) Upper back pain  Comment:    Plan:      (M51.37) Degeneration of lumbar or lumbosacral intervertebral disc  Comment:    Plan: HYDROcodone-acetaminophen (NORCO) 7.5-325 MG         per tablet, DISCONTINUED:         HYDROcodone-acetaminophen (NORCO) 7.5-325 MG         per tablet, DISCONTINUED:         HYDROcodone-acetaminophen (NORCO) 7.5-325 MG         per tablet             (G89.4) Chronic pain syndrome  Comment:    Plan: Pain Drug Scr UR W Rptd Meds             (M54.2) Cervicalgia  Comment:    Plan: HYDROcodone-acetaminophen (NORCO) 7.5-325 MG         per tablet, DISCONTINUED:         HYDROcodone-acetaminophen (NORCO) 7.5-325 MG         per tablet, DISCONTINUED:         HYDROcodone-acetaminophen (NORCO) 7.5-325 MG         per tablet                        Follow-ups after your visit        Follow-up notes from your care team     Return in about 3 months (around 8/8/2017).      Who to contact     If you have questions or need follow up information about today's clinic visit or your schedule please contact [facility] directly at [phone].  Normal or non-critical lab and imaging results will be  "communicated to you by [employer], letter or phone within 4 business days after the clinic has received the results. If you do not hear from us within 7 days, please contact the clinic through [employer] or phone. If you have a critical or abnormal lab result, we will notify you by phone as soon as possible.  Submit refill requests through [employer] or call your pharmacy and they will forward the refill request to us. Please allow 3 business days for your refill to be completed.          Additional Information About Your Visit        [employer] Information     [employer] lets you send messages to your doctor, view your test results, renew your prescriptions, schedule appointments and more. To sign up, go to [email or web address] . Click on \"Log in\" on the left side of the screen, which will take you to the Welcome page. Then click on \"Sign up Now\" on the right side of the page.     You will be asked to enter the access code listed below, as well as some personal information. Please follow the directions to create your username and password.     Your access code is: [email or web address]  Expires: 2017  1:25 PM     Your access code will  in 90 days. If you need help or a new code, please call your [facility] clinic or [phone].        Care EveryWhere ID     This is your Care EveryWhere ID. This could be used by other organizations to access your [facility] medical records  [id number]        Your Vitals Were     Pulse Temperature Respirations Pulse Oximetry BMI (Body Mass Index)       112 97.3  F (36.3  C) (Tympanic) 16 95% 30.37 kg/m2        Blood Pressure from Last 3 Encounters:   17 124/64   17 124/74   16 128/70    Weight from Last 3 Encounters:   17 182 lb 8 oz (82.8 kg)   17 211 lb 3.2 oz (95.8 kg)   16 233 lb (105.7 kg)              We Performed the Following     DEPRESSION ACTION PLAN (DAP)     Pain Drug Scr UR W Rptd Meds          Today's Medication Changes          These changes " are accurate as of: 5/8/17  1:25 PM.  If you have any questions, ask your nurse or doctor.               Start taking these medicines.        Dose/Directions    HYDROcodone-acetaminophen 7.5-325 MG per tablet   Commonly known as:  NORCO   Used for:  Degeneration of lumbar or lumbosacral intervertebral disc, Cervicalgia   Started by:  Addison Franks MD        Dose:  1 tablet   Start taking on:  7/8/2017   Take 1 tablet by mouth every 4 hours as needed for pain maximum 3-4 tablet(s) per day   Quantity:  100 tablet   Refills:  0            Where to get your medicines      Some of these will need a paper prescription and others can be bought over the counter.  Ask your nurse if you have questions.     Bring a paper prescription for each of these medications     HYDROcodone-acetaminophen 7.5-325 MG per tablet                Primary Care Provider Office Phone # Fax #    Addison Franks [phone]       [facility] [address] [address] [facility] [id number]        Thank you!     Thank you for choosing [facility]  for your care. Our goal is always to provide you with excellent care. Hearing back from our patients is one way we can continue to improve our services. Please take a few minutes to complete the written survey that you may receive in the mail after your visit with us. Thank you!             Your Updated Medication List - Protect others around you: Learn how to safely use, store and throw away your medicines at www.disposemymeds.org.          This list is accurate as of: 5/8/17  1:25 PM.  Always use your most recent med list.                   Brand Name Dispense Instructions for use    * albuterol (2.5 MG/3ML) 0.083% neb solution     360 mL    NEBULIZE AND INHALE 1 VIAL EVERY 6 HOURS AS NEEDED FOR SHORTNESS OF BREATH/DYSPNEA OR WHEEZING.       * albuterol 108 (90 BASE) MCG/ACT Inhaler    PROAIR HFA/PROVENTIL HFA/VENTOLIN HFA    1  Inhaler    Inhale 2 puffs into the lungs every 6 hours as needed for shortness of breath / dyspnea or wheezing       carbidopa-levodopa  MG per tablet    SINEMET    90 tablet    Take 1 tablet by mouth At Bedtime       cyclobenzaprine 5 MG tablet    FLEXERIL    60 tablet    TAKE 1 TABLET BY MOUTH TWICE DAILY AS NEEDED FOR MUSCLE SPASMS.       fluticasone 50 MCG/ACT spray    FLONASE    15 g    INSTILL 1 TO 2 SPRAYS IN EACH NOSTRIL DAILY.       HYDROcodone-acetaminophen 7.5-325 MG per tablet   Start taking on:  7/8/2017    NORCO    100 tablet    Take 1 tablet by mouth every 4 hours as needed for pain maximum 3-4 tablet(s) per day       hydrOXYzine 25 MG capsule    VISTARIL    270 capsule    Take 1 capsule (25 mg) by mouth 3 times daily as needed for itching       ketoconazole 2 % cream    NIZORAL    30 g    APPLY TOPICALLY TWICE DAILY.       lidocaine 4 % Crea cream    ASPERCREME W/LIDOCAINE    120 g    Apply topically once as needed for mild pain       MAGNESIA PO      Take  by mouth.       Melatonin 10 MG Tbcr    MELATONIN TR    30 tablet    Take 1 tablet by mouth every evening       * mineral oil-white petrolatum Crea cream     454 g    Apply topically two times daily       * eucerin cream     454 g    Apply topically as needed for dry skin       * order for DME      autocpap 6-10 cm       * order for DME     1 Device    Equipment being ordered: LONGITUDINAL ARCH SUPPORTS   DIAGNOSIS PAINFUL ANKLES AND KNEES WITH OSTEOARTHRTIS ADVANCE  ONE PAIR USE AS DIRECTED       ramelteon 8 MG tablet    ROZEREM    30 tablet    Take 1 tablet (8 mg) by mouth At Bedtime       SAW PALMETTO FRUIT PO      Take  by mouth.       simvastatin 10 MG tablet    ZOCOR    30 tablet    Take 1 tablet (10 mg) by mouth At Bedtime       tiotropium 18 MCG capsule    SPIRIVA HANDIHALER    90 capsule    Inhale contents of one capsule daily.       traZODone 50 MG tablet    DESYREL    60 tablet    Take 2 tablets (100 mg) by mouth nightly as needed  for sleep       triamcinolone 0.1 % cream    KENALOG    30 g    APPLY SPARINGLY TO THE AFFECTED AREA THREE TIMES A DAY AS NEEDED.       vitamin D 2000 UNITS Caps      Take 1 capsule by mouth daily.       * Notice:  This list has 6 medication(s) that are the same as other medications prescribed for you. Read the directions carefully, and ask your doctor or other care provider to review them with you.

## 2017-05-09 ASSESSMENT — PATIENT HEALTH QUESTIONNAIRE - PHQ9: SUM OF ALL RESPONSES TO PHQ QUESTIONS 1-9: 0

## 2017-05-09 ASSESSMENT — ASTHMA QUESTIONNAIRES: ACT_TOTALSCORE: 25

## 2017-05-15 LAB — PAIN DRUG SCR UR W RPTD MEDS: NORMAL

## 2017-08-06 DIAGNOSIS — F51.01 PRIMARY INSOMNIA: ICD-10-CM

## 2017-08-07 ENCOUNTER — OFFICE VISIT (OUTPATIENT)
Dept: FAMILY MEDICINE | Facility: CLINIC | Age: 62
End: 2017-08-07
Payer: COMMERCIAL

## 2017-08-07 VITALS
OXYGEN SATURATION: 98 % | DIASTOLIC BLOOD PRESSURE: 64 MMHG | HEART RATE: 91 BPM | BODY MASS INDEX: 29.37 KG/M2 | SYSTOLIC BLOOD PRESSURE: 116 MMHG | TEMPERATURE: 97.2 F | RESPIRATION RATE: 16 BRPM | WEIGHT: 176.5 LBS

## 2017-08-07 DIAGNOSIS — M25.562 CHRONIC PAIN OF BOTH KNEES: Chronic | ICD-10-CM

## 2017-08-07 DIAGNOSIS — M50.30 DEGENERATION OF CERVICAL INTERVERTEBRAL DISC: Chronic | ICD-10-CM

## 2017-08-07 DIAGNOSIS — M54.2 CERVICALGIA: ICD-10-CM

## 2017-08-07 DIAGNOSIS — J41.0 SIMPLE CHRONIC BRONCHITIS (H): ICD-10-CM

## 2017-08-07 DIAGNOSIS — Z23 NEED FOR PROPHYLACTIC VACCINATION WITH TETANUS-DIPHTHERIA (TD): Primary | ICD-10-CM

## 2017-08-07 DIAGNOSIS — F33.0 MILD EPISODE OF RECURRENT MAJOR DEPRESSIVE DISORDER (H): Chronic | ICD-10-CM

## 2017-08-07 DIAGNOSIS — E78.5 HYPERLIPIDEMIA WITH TARGET LDL LESS THAN 130: Chronic | ICD-10-CM

## 2017-08-07 DIAGNOSIS — Z23 NEED FOR SHINGLES VACCINE: ICD-10-CM

## 2017-08-07 DIAGNOSIS — M51.379 DEGENERATION OF LUMBAR OR LUMBOSACRAL INTERVERTEBRAL DISC: ICD-10-CM

## 2017-08-07 DIAGNOSIS — G89.29 CHRONIC PAIN OF BOTH KNEES: Chronic | ICD-10-CM

## 2017-08-07 DIAGNOSIS — M19.172 POST-TRAUMATIC OSTEOARTHRITIS OF LEFT ANKLE: ICD-10-CM

## 2017-08-07 DIAGNOSIS — Z23 NEED FOR VACCINATION: ICD-10-CM

## 2017-08-07 DIAGNOSIS — M25.561 CHRONIC PAIN OF BOTH KNEES: Chronic | ICD-10-CM

## 2017-08-07 PROCEDURE — 90471 IMMUNIZATION ADMIN: CPT | Performed by: FAMILY MEDICINE

## 2017-08-07 PROCEDURE — 90736 HZV VACCINE LIVE SUBQ: CPT | Performed by: FAMILY MEDICINE

## 2017-08-07 PROCEDURE — 99214 OFFICE O/P EST MOD 30 MIN: CPT | Mod: 25 | Performed by: FAMILY MEDICINE

## 2017-08-07 RX ORDER — HYDROCODONE BITARTRATE AND ACETAMINOPHEN 7.5; 325 MG/1; MG/1
1 TABLET ORAL EVERY 4 HOURS PRN
Qty: 100 TABLET | Refills: 0 | Status: SHIPPED | OUTPATIENT
Start: 2017-09-07 | End: 2017-08-07

## 2017-08-07 RX ORDER — HYDROCODONE BITARTRATE AND ACETAMINOPHEN 7.5; 325 MG/1; MG/1
1 TABLET ORAL EVERY 4 HOURS PRN
Qty: 100 TABLET | Refills: 0 | Status: SHIPPED | OUTPATIENT
Start: 2017-10-07 | End: 2017-11-09

## 2017-08-07 RX ORDER — HYDROCODONE BITARTRATE AND ACETAMINOPHEN 7.5; 325 MG/1; MG/1
1 TABLET ORAL EVERY 4 HOURS PRN
Qty: 100 TABLET | Refills: 0 | Status: SHIPPED | OUTPATIENT
Start: 2017-08-07 | End: 2017-08-07

## 2017-08-07 NOTE — NURSING NOTE
"Chief Complaint   Patient presents with     Pain       Initial /64  Pulse 91  Temp 97.2  F (36.2  C) (Tympanic)  Resp 16  Wt 176 lb 8 oz (80.1 kg)  SpO2 98%  BMI 29.37 kg/m2 Estimated body mass index is 29.37 kg/(m^2) as calculated from the following:    Height as of 2/6/17: 5' 5\" (1.651 m).    Weight as of this encounter: 176 lb 8 oz (80.1 kg).  Medication Reconciliation: complete   Lisandra Simon CMA    "

## 2017-08-07 NOTE — PATIENT INSTRUCTIONS
(Z23) Need for prophylactic vaccination with tetanus-diphtheria (TD)  (primary encounter diagnosis)  Comment:    Plan:      (M25.561,  M25.562,  G89.29) Chronic pain of both knees  Comment:    Plan:      (E78.5) Hyperlipidemia with target LDL less than 130  Comment:    Plan:      (M50.30) Degeneration of cervical intervertebral disc  Comment:    Plan:      (F33.0) Mild episode of recurrent major depressive disorder (H)  Comment:    Plan:      (M19.172) Post-traumatic osteoarthritis of left ankle  Comment:    Plan: order for DME             (Z23) Need for shingles vaccine  Comment:    Plan: zoster vaccine live, PF, (ZOSTAVAX) injection             (M51.37) Degeneration of lumbar or lumbosacral intervertebral disc  Comment:    Plan: HYDROcodone-acetaminophen (NORCO) 7.5-325 MG         per tablet, DISCONTINUED:         HYDROcodone-acetaminophen (NORCO) 7.5-325 MG         per tablet, DISCONTINUED:         HYDROcodone-acetaminophen (NORCO) 7.5-325 MG         per tablet             (M54.2) Cervicalgia  Comment:    Plan: HYDROcodone-acetaminophen (NORCO) 7.5-325 MG         per tablet, DISCONTINUED:         HYDROcodone-acetaminophen (NORCO) 7.5-325 MG         per tablet, DISCONTINUED:         HYDROcodone-acetaminophen (NORCO) 7.5-325 MG         per tablet

## 2017-08-07 NOTE — PROGRESS NOTES
"  SUBJECTIVE:                                                    Claude D Feinstein is a 62 year old male who presents to clinic today for the following health issues:      Chronic Pain Follow-Up       Type / Location of Pain: KNEE, LOWER BACK  Analgesia/pain control:       Recent changes:  same      Overall control: Comfortably manageable  Activity level/function:      Daily activities:  Able to do moderate activities    Work:  not applicable  Adverse effects:  Yes stiffens up  Adherance    Taking medication as directed?  Yes    Participating in other treatments: not applicable  Risk Factors:    Sleep:  Poor    Mood/anxiety:  controlled    Recent family or social stressors:  none noted    Other aggravating factors: sedentary lifestyle  PHQ-9 SCORE 5/9/2016 11/7/2016 5/8/2017   Total Score - - -   Total Score 0 0 0     RAKESH-7 SCORE 11/3/2015 11/7/2016   Total Score 0 0     Encounter-Level CSA - 09/08/2015:          Controlled Substance Agreement - Scan on 9/16/2015 11:59 AM : [facility], Controlled Substance Contract, 09-08-15 (below)                  Amount of exercise or physical activity: 2-3 days/week for an average of 45-60 minutes    Problems taking medications regularly: No    Medication side effects: none  Diet: regular (no restrictions)    .JEANNIE LO MD .8/7/2017 8:17 AM .August 17, 2017    CERVICAL DISC DISEASE ONGOING PAIN     RESTLESS LEG SYNDROME IMPROVED with CURRENT MEDICATIONS     LUMBAR DISC DISEASE ONGOING SYMPTOMS     OSTEOARTHRITIS KNEES AND BILATERAL TOTAL KNEE ARTHROPLASTY WITH LESS PAIN     OSTEOARTHRITIS OF ANKLES     OBESITY IMPROVED WITH 1400 CALORY DIET    OBSTRUCTIVE SLEEP APNEA IMPROVED with WEIGHT LOSS    LDL OR \"BAD\" CHOLESTEROL  GOAL < 100 IMPROVED with WEIGHT LOSS AND MEDICATIONS     NO SIGNIFICANT SIDE EFFECTS BENEFITS AND RISKS DISCUSSED  WITH CURRENT STATIN    MAJOR DEPRESSION IMPROVED  WITH MIXED ANXIETY     RARE USAGE OR MARIJUANA    INSOMNIA IMPROVED     Current Outpatient " Prescriptions   Medication Sig Dispense Refill     order for DME Bilateral   DEEP SHOES WITH   WITH LONGITUDINAL ARCH SUPPORT  FOR NODULAR PLANTAR FASCIITIS 1 each 1     [START ON 10/7/2017] HYDROcodone-acetaminophen (NORCO) 7.5-325 MG per tablet Take 1 tablet by mouth every 4 hours as needed for pain maximum 3-4 tablet(s) per day 100 tablet 0     cyclobenzaprine (FLEXERIL) 5 MG tablet TAKE 1 TABLET BY MOUTH TWICE DAILY AS NEEDED FOR MUSCLE SPASMS. 60 tablet 3     Melatonin (MELATONIN TR) 10 MG TBCR Take 1 tablet by mouth every evening 30 tablet 11     lidocaine (ASPERCREME W/LIDOCAINE) 4 % CREA 4% topical cream Apply topically once as needed for mild pain 120 g 11     albuterol (2.5 MG/3ML) 0.083% nebulizer solution NEBULIZE AND INHALE 1 VIAL EVERY 6 HOURS AS NEEDED FOR SHORTNESS OF BREATH/DYSPNEA OR WHEEZING. 360 mL 1     albuterol (PROAIR HFA, PROVENTIL HFA, VENTOLIN HFA) 108 (90 BASE) MCG/ACT inhaler Inhale 2 puffs into the lungs every 6 hours as needed for shortness of breath / dyspnea or wheezing 1 Inhaler 11     triamcinolone (KENALOG) 0.1 % cream APPLY SPARINGLY TO THE AFFECTED AREA THREE TIMES A DAY AS NEEDED. 30 g 0     ramelteon (ROZEREM) 8 MG tablet Take 1 tablet (8 mg) by mouth At Bedtime 30 tablet 11     tiotropium (SPIRIVA HANDIHALER) 18 MCG inhalation capsule Inhale contents of one capsule daily. 90 capsule 3     fluticasone (FLONASE) 50 MCG/ACT nasal spray INSTILL 1 TO 2 SPRAYS IN EACH NOSTRIL DAILY. 15 g 11     carbidopa-levodopa (SINEMET)  MG per tablet Take 1 tablet by mouth At Bedtime 90 tablet 3     hydrOXYzine (VISTARIL) 25 MG capsule Take 1 capsule (25 mg) by mouth 3 times daily as needed for itching 270 capsule 3     simvastatin (ZOCOR) 10 MG tablet Take 1 tablet (10 mg) by mouth At Bedtime 30 tablet 11     ketoconazole (NIZORAL) 2 % cream APPLY TOPICALLY TWICE DAILY. 30 g 2     Skin Protectants, Misc. (EUCERIN) cream Apply topically as needed for dry skin 454 g G     mineral oil-white  petrolatum (MINERIN, EUCERIN) CREA Apply topically two times daily 454 g 11     ORDER FOR DME Equipment being ordered: LONGITUDINAL ARCH SUPPORTS   DIAGNOSIS PAINFUL ANKLES AND KNEES WITH OSTEOARTHRTIS ADVANCE  ONE PAIR USE AS DIRECTED 1 Device 1     ORDER FOR DME autocpap 6-10 cm       Cholecalciferol (VITAMIN D) 2000 UNITS CAPS Take 1 capsule by mouth daily.       Magnesium Hydroxide (MAGNESIA PO) Take  by mouth.       Saw Palmetto, Serenoa repens, (SAW PALMETTO FRUIT PO) Take  by mouth.       traZODone (DESYREL) 50 MG tablet TAKE 2 TABLETS BY MOUTH EVERY NIGHT AS NEEDED FOR SLEEP. 60 tablet 11        No Known Allergies    Immunization History   Administered Date(s) Administered     Influenza (H1N1) 2010     Influenza (IIV3) 09/15/2010, 2011, 10/16/2012, 2013     Influenza Vaccine IM 3yrs+ 4 Valent IIV4 2014     Pneumococcal 23 valent 2012     Tdap (Adacel,Boostrix) 2007     Zoster vaccine, live 2017         reports that he drinks alcohol.      reports that he uses illicit drugs.    family history includes Alzheimer Disease in his mother.    indicated that his mother is . He indicated that his father is .      has a past surgical history that includes Foot surgery (); knee surgery (); Ankle surgery (2007); orthopedic surgery (); carpal tunnel release rt/lt; and Head and neck surgery ().     reports that he does not engage in sexual activity.  .  Pediatric History   Patient Guardian Status     Not on file.     Other Topics Concern     Parent/Sibling W/ Cabg, Mi Or Angioplasty Before 65f 55m? No     Social History Narrative         reports that he has been smoking Cigarettes.  He has been smoking about 1.00 pack per day. He has never used smokeless tobacco.    Medical, social, surgical, and family histories reviewed.    Labs reviewed in EPIC  Patient Active Problem List   Diagnosis     Degeneration of cervical intervertebral disc      Restless legs syndrome (RLS)     Pruritic disorder     Degeneration of lumbar or lumbosacral intervertebral disc     Carpal tunnel syndrome     Obesity     External hemorrhoids     Primary localized osteoarthrosis, lower leg     Prurigo     Illiterate     DALTON (obstructive sleep apnea)- mild (AHI 5.9)     Hyperlipidemia with target LDL less than 130     Knee pain     Major depression, recurrent (H)     Major depressive disorder, recurrent episode, moderate (H)     Adjustment disorder with depressed mood     Neck pain     Major depression in partial remission (H)     Mixed anxiety depressive disorder     Advance Care Planning     Anxiety     Chronic pain syndrome     Other chronic pain     Midline low back pain with sciatica, sciatica laterality unspecified     Status post total bilateral knee replacement     Simple chronic bronchitis (H)     Psychophysiologic insomnia     Rash     Past Surgical History:   Procedure Laterality Date     ANKLE SURGERY  7/16/2007    synovectomy     CARPAL TUNNEL RELEASE RT/LT       FOOT SURGERY  1991    L bunionectomy     HEAD & NECK SURGERY  1958    plate in head     KNEE SURGERY  1991    R Knee Arthroscopy     ORTHOPEDIC SURGERY  1990    arm surgery laceration R forearm       Social History   Substance Use Topics     Smoking status: Current Every Day Smoker     Packs/day: 1.00     Types: Cigarettes     Last attempt to quit: 3/10/2015     Smokeless tobacco: Never Used     Alcohol use Yes     Family History   Problem Relation Age of Onset     Alzheimer Disease Mother          No Known Allergies  Recent Labs   Lab Test  08/01/16   1418  03/17/15   1333   06/11/13   1427  01/29/13   1211   05/25/12   1646   A1C  5.4   --    --   5.8   --    --    --    LDL  66   --    --   55  71   --   90.2   HDL  44   --    --   47  50   --   53   TRIG  163*   --    --   151*  121   --   189*   ALT  56   --    --    --   40   --   86.0*   CR  0.88  1.00   --   0.90  0.90   --   0.8   GFRESTIMATED  89  77    --   88  88   < >   --    GFRESTBLACK  >90  >90   --   >90  >90   < >   --    POTASSIUM  3.9  4.3   < >  4.1  4.0   --   4.3   TSH   --    --    --   2.73   --    --    --     < > = values in this interval not displayed.        BP Readings from Last 6 Encounters:   08/07/17 116/64   05/08/17 124/64   02/06/17 124/74   11/07/16 128/70   08/01/16 118/72   05/09/16 118/74       Wt Readings from Last 3 Encounters:   08/07/17 176 lb 8 oz (80.1 kg)   05/08/17 182 lb 8 oz (82.8 kg)   02/06/17 211 lb 3.2 oz (95.8 kg)         Positive symptoms or findings indicated by bold designation:     ROS: 10 point ROS neg other than the symptoms noted above in the HPI.except  has Degeneration of cervical intervertebral disc; Restless legs syndrome (RLS); Pruritic disorder; Degeneration of lumbar or lumbosacral intervertebral disc; Carpal tunnel syndrome; Obesity; External hemorrhoids; Primary localized osteoarthrosis, lower leg; Prurigo; Illiterate; DALTON (obstructive sleep apnea)- mild (AHI 5.9); Hyperlipidemia with target LDL less than 130; Knee pain; Major depression, recurrent (H); Major depressive disorder, recurrent episode, moderate (H); Adjustment disorder with depressed mood; Neck pain; Major depression in partial remission (H); Mixed anxiety depressive disorder; Advance Care Planning; Anxiety; Chronic pain syndrome; Other chronic pain; Midline low back pain with sciatica, sciatica laterality unspecified; Status post total bilateral knee replacement; Simple chronic bronchitis (H); Psychophysiologic insomnia; and Rash on his problem list.   Constitutional: The patient denied fatigue, fever, insomnia, night sweats, recent illness and weight loss.  WEIGHT LOSS ON PURPOSE 1400 CALORY DIET     Eyes: The patient denied blindness, eye pain, eye tearing, photophobia, vision change and visual disturbance. STABLE        Ears/Nose/Throat/Neck: The patient denied dizziness, facial pain, hearing loss, nasal discharge, oral pain, otalgia,  postnasal drip, sinus congestion, sore throat, tinnitus and voice change.   NORMAL HEARING AND BALANCE      Cardiovascular: The patient denied arrhythmia, chest pain/pressure, claudication, edema, exercise intolerance, fatigue, orthopnea, palpitations and syncope. NORMAL     Respiratory: The patient denied asthma, chest congestion, cough, dyspnea on exertion, dyspnea/shortness of breath, hemoptysis, pedal edema, pleuritic pain, productive sputum, snoring and wheezing. FORMER SMOKER WITH CHRONIC BRONCHITIS  IMPROVED with WEIGHT LOSS   INTERMITTENT EXACERBATION    Gastrointestinal: The patient denied abdominal pain, anorexia, constipation, diarrhea, dysphagia, gastroesophageal reflux, hematochezia, hemorrhoids, melena, nausea and vomiting . IMPROVED with WEIGHT LOSS     Genitourinary/Nephrology: The patient denied breast complaint, dysuria, nocturia sexual dysfunction, t, urinary frequency, urinary incontinence, urinary urgency    OK     Musculoskeletal:   SEE HISTORY OF PRESENT ILLNESS     Dermatoligic:: The patient denied acne, dermatitis, ecchymosis, itching, mole change, rash, skin cancer, skin lesion and sores.  NORMAL   SCARS ON KNEES BILATERAL       Neurologic: The patient denied dizziness, gait abnormality, headache, memory loss, mental status change, paresis, paresthesia, seizure, syncope, tremor and vision change. MILD LIMP     Psychiatric: The patient denied anxiety, depression, disturbances of memory, drug abuse, insomnia, mood swings and relationship difficulties.  ANXIETY DEPRESSIONIM PROVED     Endocrine: The patient denied , goiter, obesity, polyuria and thyroid disease.  WEIGHT LOSS OBESITY IS GONE   NOW JUST OVER WEIGHT     Hematologic/Lymphatic: The patient denied abnormal bleeding and bruising, abnormal ecchymoses, anemia, lymph node enlargement/mass, petechiae and venous  Thrombosis.  NORMAL     Allergy/Immunology: The patient denied food allergy and  Allergic rhinitis or conjunctivitis.  NORMAL        PE:  /64  Pulse 91  Temp 97.2  F (36.2  C) (Tympanic)  Resp 16  Wt 176 lb 8 oz (80.1 kg)  SpO2 98%  BMI 29.37 kg/m2 Body mass index is 29.37 kg/(m^2).    Constitutional: general appearance, well nourished, well developed, in no acute distress, well developed, appears stated age, normal body habitus,  OVER WEIGHT     Eyes:; The patient has normal eyelids sclerae and conjunctivae : NORMAL      Ears/Nose/Throat: external ear, overall: normal appearance; external nose, overall: benign appearance, normal moujth gums and lips  The patient has:  NORMAL      Neck: thyroid, overall: normal size, normal consistency, nontender,  NORMAL      Respiratory:  palpation of chest, overall: normal excursion,  NORMAL     Clear to percussion and auscultation  NORMAL      Tachypnea  NORMAL   Color  NOT APPLICABLE     Cardiovascular:  Good color with no peripheral edema  NORMAL    Regular sinus rhythm without murmur. Physiologic heart sounds Heart is unelarged  .   Chest/Breast: normal shape   NORMAL       Abdominal exam,  Liver and spleen are  unenlarged  NORMAL        Tenderness  NOT APPLICABLE   Scars  NORMAL      Urogenital; no renal, flank or bladder  tenderness;  NORMAL       Lymphatic: neck nodes,  NORMAL     Other nodes NOT APPLICABLE      Musculoskeletal:  Brief ortho exam normal except:   OSTEOARTHRITIS KNEES AND ANKLES  BILATERAL TOTAL KNEE ARTHROPLASTY   DECREASE RANGE OF MOTION OF BACK AND NECK   NORMAL STRAIGHT LEG RAISING TEST   NORMAL REFLEXES     Integument: inspection of skin, no rash, lesions; and, palpation, no induration, no tenderness.      Neurologic mental status, overall: alert and oriented; gait, no ataxia, no unsteadiness; coordination, no tremors; cranial nerves, overall: normal motor, overall: normal bulk, tone.  NORMAL      Psychiatric: orientation/consciousness, overall: oriented to person, place and time; behavior/psychomotor activity, no tics, normal psychomotor activity; mood and affect,  overall: normal mood and affect; appearance, overall: well-groomed, good eye contact; speech, overall: normal quality, no aphasia and normal quality, quantity, intact.  IMPROVED DEPRESSION    Diagnostic Test Results:  Results for orders placed or performed in visit on 05/08/17   Pain Drug Scr UR W Rptd Meds   Result Value Ref Range    Pain Drug SCR UR W RPTD Meds       FINAL  (Note)  ====================================================================  [facility] COMP DRUG ANALYSIS,UR  ====================================================================  Test                             Result       Flag       Units    Drug Present   Carboxy-THC                    7                       ng/mg creat    Carboxy-THC is a metabolite of tetrahydrocannabinol  (THC).    Source of THC is most commonly illicit, but THC is also present    in a scheduled prescription medication.     Hydrocodone                    1425                    ng/mg creat   Hydromorphone                  270                     ng/mg creat   Dihydrocodeine                 106                     ng/mg creat   Norhydrocodone                 2140                    ng/mg creat    Sources of hydrocodone include scheduled prescription    medications. Hydromorphone, dihydrocodeine and norhydrocodone are    expected metabolites of hydrocodone. Hydromorphone and    dihydrocodeine are also av ailable as scheduled prescription    medications.     Cyclobenzaprine                PRESENT   Trazodone                      PRESENT   1,3 chlorophenyl piperazine    PRESENT    1,3-chlorophenyl piperazine is an expected metabolite of    trazodone.     Acetaminophen                  PRESENT   Hydroxyzine                    PRESENT  ====================================================================  Test                      Result    Flag   Units      Ref Range   Creatinine              151              mg/dL       >=20  ====================================================================  Declared Medications:  Medication list was not provided.  ====================================================================  For clinical consultation, please call [phone].  ====================================================================  Analysis performed by [facility] [place] [address]           ICD-10-CM    1. Need for prophylactic vaccination with tetanus-diphtheria (TD) Z23    2. Chronic pain of both knees M25.561     M25.562     G89.29    3. Hyperlipidemia with target LDL less than 130 E78.5    4. Degeneration of cervical intervertebral disc M50.30    5. Mild episode of recurrent major depressive disorder (H) F33.0    6. Post-traumatic osteoarthritis of left ankle M19.172 order for DME   7. Need for shingles vaccine Z23 zoster vaccine live, PF, (ZOSTAVAX) injection   8. Degeneration of lumbar or lumbosacral intervertebral disc M51.37 HYDROcodone-acetaminophen (NORCO) 7.5-325 MG per tablet     DISCONTINUED: HYDROcodone-acetaminophen (NORCO) 7.5-325 MG per tablet     DISCONTINUED: HYDROcodone-acetaminophen (NORCO) 7.5-325 MG per tablet   9. Cervicalgia M54.2 HYDROcodone-acetaminophen (NORCO) 7.5-325 MG per tablet     DISCONTINUED: HYDROcodone-acetaminophen (NORCO) 7.5-325 MG per tablet     DISCONTINUED: HYDROcodone-acetaminophen (NORCO) 7.5-325 MG per tablet   10. Need for vaccination Z23 ZOSTER VACCINE LIVE SUB-Q NJX [74743]     1st  Administration  [18562]   11. Simple chronic bronchitis (H) J41.0 COPD ACTION PLAN        .    Side effects benefits and risks thoroughly discussed. .he may come in early if unimproved or getting worse          Importance of adhering to regimen discussed and if medications were dispensed, the importance of taking medications discussed and bringing in the medications after every visit for chronic problems         Please drink 2 glasses of water prior to meals and walk 15-30  minutes after meals    I spent 25 MINUTES SPENT  with patient discussing the following issues   The primary encounter diagnosis was Need for prophylactic vaccination with tetanus-diphtheria (TD). Diagnoses of Chronic pain of both knees, Hyperlipidemia with target LDL less than 130, Degeneration of cervical intervertebral disc, Mild episode of recurrent major depressive disorder (H), Post-traumatic osteoarthritis of left ankle, Need for shingles vaccine, Degeneration of lumbar or lumbosacral intervertebral disc, Cervicalgia, Need for vaccination, and Simple chronic bronchitis (H) were also pertinent to this visit. over half of which involved counseling and coordination of care.    Patient Instructions   (Z23) Need for prophylactic vaccination with tetanus-diphtheria (TD)  (primary encounter diagnosis)  Comment:    Plan:      (M25.561,  M25.562,  G89.29) Chronic pain of both knees  Comment:    Plan:      (E78.5) Hyperlipidemia with target LDL less than 130  Comment:    Plan:      (M50.30) Degeneration of cervical intervertebral disc  Comment:    Plan:      (F33.0) Mild episode of recurrent major depressive disorder (H)  Comment:    Plan:      (M19.172) Post-traumatic osteoarthritis of left ankle  Comment:    Plan: order for DME             (Z23) Need for shingles vaccine  Comment:    Plan: zoster vaccine live, PF, (ZOSTAVAX) injection             (M51.37) Degeneration of lumbar or lumbosacral intervertebral disc  Comment:    Plan: HYDROcodone-acetaminophen (NORCO) 7.5-325 MG         per tablet, DISCONTINUED:         HYDROcodone-acetaminophen (NORCO) 7.5-325 MG         per tablet, DISCONTINUED:         HYDROcodone-acetaminophen (NORCO) 7.5-325 MG         per tablet             (M54.2) Cervicalgia  Comment:    Plan: HYDROcodone-acetaminophen (NORCO) 7.5-325 MG         per tablet, DISCONTINUED:         HYDROcodone-acetaminophen (NORCO) 7.5-325 MG         per tablet, DISCONTINUED:         HYDROcodone-acetaminophen (NORCO)  7.5-325 MG         per tablet                       ALL THE ABOVE PROBLEMS ARE STABLE AND MED CHANGES AS NOTED    Diet: MEDITERRANEAN DIET AND 1400 CALORY DIET     Exercise:  NECK, LOWER BACK PAIN AND ANKLE EXERCISES  Exercises Range of motion, balance, isometric, and strengthening exercises 30 repetitions twice daily of involved joints      .JEANNIE LO MD 8/7/2017 8:17 AM  August 17, 2017

## 2017-08-07 NOTE — MR AVS SNAPSHOT
After Visit Summary   8/7/2017    Claude D [facility]    MRN: [id number]           Patient Information     Date Of Birth          1955        Visit Information        Provider Department      8/7/2017 1:00 PM Addison Franks MD [facility]        Today's Diagnoses     Need for prophylactic vaccination with tetanus-diphtheria (TD)    -  1    Chronic pain of both knees        Hyperlipidemia with target LDL less than 130        Degeneration of cervical intervertebral disc        Mild episode of recurrent major depressive disorder (H)        Post-traumatic osteoarthritis of left ankle        Need for shingles vaccine        Degeneration of lumbar or lumbosacral intervertebral disc        Cervicalgia          Care Instructions    (Z23) Need for prophylactic vaccination with tetanus-diphtheria (TD)  (primary encounter diagnosis)  Comment:    Plan:      (M25.561,  M25.562,  G89.29) Chronic pain of both knees  Comment:    Plan:      (E78.5) Hyperlipidemia with target LDL less than 130  Comment:    Plan:      (M50.30) Degeneration of cervical intervertebral disc  Comment:    Plan:      (F33.0) Mild episode of recurrent major depressive disorder (H)  Comment:    Plan:      (M19.172) Post-traumatic osteoarthritis of left ankle  Comment:    Plan: order for DME             (Z23) Need for shingles vaccine  Comment:    Plan: zoster vaccine live, PF, (ZOSTAVAX) injection             (M51.37) Degeneration of lumbar or lumbosacral intervertebral disc  Comment:    Plan: HYDROcodone-acetaminophen (NORCO) 7.5-325 MG         per tablet, DISCONTINUED:         HYDROcodone-acetaminophen (NORCO) 7.5-325 MG         per tablet, DISCONTINUED:         HYDROcodone-acetaminophen (NORCO) 7.5-325 MG         per tablet             (M54.2) Cervicalgia  Comment:    Plan: HYDROcodone-acetaminophen (NORCO) 7.5-325 MG         per tablet, DISCONTINUED:         HYDROcodone-acetaminophen (NORCO)  "7.5-325 MG         per tablet, DISCONTINUED:         HYDROcodone-acetaminophen (NORCO) 7.5-325 MG         per tablet                         Follow-ups after your visit        Who to contact     If you have questions or need follow up information about today's clinic visit or your schedule please contact [facility] directly at [phone].  Normal or non-critical lab and imaging results will be communicated to you by MyChart, letter or phone within 4 business days after the clinic has received the results. If you do not hear from us within 7 days, please contact the clinic through [employer] or phone. If you have a critical or abnormal lab result, we will notify you by phone as soon as possible.  Submit refill requests through [employer] or call your pharmacy and they will forward the refill request to us. Please allow 3 business days for your refill to be completed.          Additional Information About Your Visit        [employer] Information     [employer] lets you send messages to your doctor, view your test results, renew your prescriptions, schedule appointments and more. To sign up, go to [email or web address] . Click on \"Log in\" on the left side of the screen, which will take you to the Welcome page. Then click on \"Sign up Now\" on the right side of the page.     You will be asked to enter the access code listed below, as well as some personal information. Please follow the directions to create your username and password.     Your access code is: [email or web address]  Expires: 2017  1:38 PM     Your access code will  in 90 days. If you need help or a new code, please call your [facility] clinic or [phone].        Care EveryWhere ID     This is your Care EveryWhere ID. This could be used by other organizations to access your [facility] medical records  [id number]        Your Vitals Were     Pulse Temperature Respirations Pulse Oximetry BMI (Body Mass Index)       91 97.2  F " (36.2  C) (Tympanic) 16 98% 29.37 kg/m2        Blood Pressure from Last 3 Encounters:   08/07/17 116/64   05/08/17 124/64   02/06/17 124/74    Weight from Last 3 Encounters:   08/07/17 176 lb 8 oz (80.1 kg)   05/08/17 182 lb 8 oz (82.8 kg)   02/06/17 211 lb 3.2 oz (95.8 kg)              Today, you had the following     No orders found for display         Today's Medication Changes          These changes are accurate as of: 8/7/17  1:38 PM.  If you have any questions, ask your nurse or doctor.               Start taking these medicines.        Dose/Directions    HYDROcodone-acetaminophen 7.5-325 MG per tablet   Commonly known as:  NORCO   Used for:  Degeneration of lumbar or lumbosacral intervertebral disc, Cervicalgia   Started by:  Addison Franks MD        Dose:  1 tablet   Start taking on:  10/7/2017   Take 1 tablet by mouth every 4 hours as needed for pain maximum 3-4 tablet(s) per day   Quantity:  100 tablet   Refills:  0       order for DME   Used for:  Post-traumatic osteoarthritis of left ankle   Started by:  Addison Franks MD        Bilateral  DEEP SHOES WITH  WITH LONGITUDINAL ARCH SUPPORT FOR NODULAR PLANTAR FASCIITIS   Quantity:  1 each   Refills:  1       zoster vaccine live (PF) injection   Commonly known as:  ZOSTAVAX   Used for:  Need for shingles vaccine   Started by:  Addison Franks MD        Dose:  1 each   Inject 0.65 mLs Subcutaneous once for 1 dose   Quantity:  0.65 mL   Refills:  0            Where to get your medicines      These medications were sent to [facility] - [place] - [address]  [address], [place] [address]     Phone:  [phone]     zoster vaccine live (PF) injection         Some of these will need a paper prescription and others can be bought over the counter.  Ask your nurse if you have questions.     Bring a paper prescription for each of these medications      HYDROcodone-acetaminophen 7.5-325 MG per tablet    order for DME                Primary Care Provider Office Phone # Fax #    Addison Linh Franks [phone]       [facility] [address]  [facility] [address]        Equal Access to Services     ALEXSANDRA REDMOND : Hadii aad ku hadasho Soomaali, waaxda luqadaha, qaybta kaalmada adeegyada, waxay idiin hayaan adeeg khguerabrenda laagnieszka king. So [facility] [phone].    ATENCIÓN: Si habla español, tiene a garvey disposición servicios gratuitos de asistencia lingüística. Llame al [phone].    We comply with applicable federal civil rights laws and [place] laws. We do not discriminate on the basis of race, color, national origin, age, disability sex, sexual orientation or gender identity.            Thank you!     Thank you for choosing [facility]  for your care. Our goal is always to provide you with excellent care. Hearing back from our patients is one way we can continue to improve our services. Please take a few minutes to complete the written survey that you may receive in the mail after your visit with us. Thank you!             Your Updated Medication List - Protect others around you: Learn how to safely use, store and throw away your medicines at www.disposemymeds.org.          This list is accurate as of: 8/7/17  1:38 PM.  Always use your most recent med list.                   Brand Name Dispense Instructions for use Diagnosis    * albuterol (2.5 MG/3ML) 0.083% neb solution     360 mL    NEBULIZE AND INHALE 1 VIAL EVERY 6 HOURS AS NEEDED FOR SHORTNESS OF BREATH/DYSPNEA OR WHEEZING.    Simple chronic bronchitis (H)       * albuterol 108 (90 BASE) MCG/ACT Inhaler    PROAIR HFA/PROVENTIL HFA/VENTOLIN HFA    1 Inhaler    Inhale 2 puffs into the lungs every 6 hours as needed for shortness of breath / dyspnea or wheezing    Simple chronic bronchitis (H)       carbidopa-levodopa  MG per tablet     SINEMET    90 tablet    Take 1 tablet by mouth At Bedtime    Restless legs syndrome (RLS)       cyclobenzaprine 5 MG tablet    FLEXERIL    60 tablet    TAKE 1 TABLET BY MOUTH TWICE DAILY AS NEEDED FOR MUSCLE SPASMS.    Chronic left-sided low back pain without sciatica       fluticasone 50 MCG/ACT spray    FLONASE    15 g    INSTILL 1 TO 2 SPRAYS IN EACH NOSTRIL DAILY.    Asthma, chronic, mild persistent, uncomplicated, Simple chronic bronchitis (H)       HYDROcodone-acetaminophen 7.5-325 MG per tablet   Start taking on:  10/7/2017    NORCO    100 tablet    Take 1 tablet by mouth every 4 hours as needed for pain maximum 3-4 tablet(s) per day    Degeneration of lumbar or lumbosacral intervertebral disc, Cervicalgia       hydrOXYzine 25 MG capsule    VISTARIL    270 capsule    Take 1 capsule (25 mg) by mouth 3 times daily as needed for itching    Mixed anxiety depressive disorder       ketoconazole 2 % cream    NIZORAL    30 g    APPLY TOPICALLY TWICE DAILY.    Scrotal rash       lidocaine 4 % Crea cream    ASPERCREME W/LIDOCAINE    120 g    Apply topically once as needed for mild pain    Chronic pain syndrome, Chronic pain of both knees, Degeneration of lumbar or lumbosacral intervertebral disc, Cervicalgia       MAGNESIA PO      Take  by mouth.        Melatonin 10 MG Tbcr    MELATONIN TR    30 tablet    Take 1 tablet by mouth every evening    Primary insomnia       * mineral oil-white petrolatum Crea cream     454 g    Apply topically two times daily    Pruritic disorder       * eucerin cream     454 g    Apply topically as needed for dry skin    Dermatitis, dyshidrotic       * order for DME      autocpap 6-10 cm        * order for DME     1 Device    Equipment being ordered: LONGITUDINAL ARCH SUPPORTS   DIAGNOSIS PAINFUL ANKLES AND KNEES WITH OSTEOARTHRTIS ADVANCE  ONE PAIR USE AS DIRECTED    Pain in joint involving ankle and foot, unspecified laterality       order for DME     1 each    Bilateral  DEEP SHOES WITH   WITH LONGITUDINAL ARCH SUPPORT FOR NODULAR PLANTAR FASCIITIS    Post-traumatic osteoarthritis of left ankle       ramelteon 8 MG tablet    ROZEREM    30 tablet    Take 1 tablet (8 mg) by mouth At Bedtime    Need for hepatitis C screening test       SAW PALMETTO FRUIT PO      Take  by mouth.        simvastatin 10 MG tablet    ZOCOR    30 tablet    Take 1 tablet (10 mg) by mouth At Bedtime    High cholesterol       tiotropium 18 MCG capsule    SPIRIVA HANDIHALER    90 capsule    Inhale contents of one capsule daily.    Simple chronic bronchitis (H)       traZODone 50 MG tablet    DESYREL    60 tablet    Take 2 tablets (100 mg) by mouth nightly as needed for sleep    Primary insomnia       triamcinolone 0.1 % cream    KENALOG    30 g    APPLY SPARINGLY TO THE AFFECTED AREA THREE TIMES A DAY AS NEEDED.    Rash       vitamin D 2000 UNITS Caps      Take 1 capsule by mouth daily.        zoster vaccine live (PF) injection    ZOSTAVAX    0.65 mL    Inject 0.65 mLs Subcutaneous once for 1 dose    Need for shingles vaccine       * Notice:  This list has 6 medication(s) that are the same as other medications prescribed for you. Read the directions carefully, and ask your doctor or other care provider to review them with you.

## 2017-08-07 NOTE — LETTER
My Asthma Action Plan  Name: Claude D Feinstein   YOB: 1955  Date: 8/7/2017   My doctor: JEANNIE LO MD   My clinic: [facility]        My Control Medicine: { :830079}  My Rescue Medicine: { :349337}  {AAP include Oral Steroid:622841} My Asthma Severity: { :350169}  Avoid your asthma triggers: { :695682}        {Is patient a child or adult?:284213}       GREEN ZONE   Good Control    I feel good    No cough or wheeze    Can work, sleep and play without asthma symptoms       Take your asthma control medicine every day.     1. If exercise triggers your asthma, take your rescue medication    15 minutes before exercise or sports, and    During exercise if you have asthma symptoms  2. Spacer to use with inhaler: If you have a spacer, make sure to use it with your inhaler             YELLOW ZONE Getting Worse  I have ANY of these:    I do not feel good    Cough or wheeze    Chest feels tight    Wake up at night   1. Keep taking your Green Zone medications  2. Start taking your rescue medicine:    every 20 minutes for up to 1 hour. Then every 4 hours for 24-48 hours.  3. If you stay in the Yellow Zone for more than 12-24 hours, contact your doctor.  4. If you do not return to the Green Zone in 12-24 hours or you get worse, start taking your oral steroid medicine if prescribed by your provider.           RED ZONE Medical Alert - Get Help  I have ANY of these:    I feel awful    Medicine is not helping    Breathing getting harder    Trouble walking or talking    Nose opens wide to breathe       1. Take your rescue medicine NOW  2. If your provider has prescribed an oral steroid medicine, start taking it NOW  3. Call your doctor NOW  4. If you are still in the Red Zone after 20 minutes and you have not reached your doctor:    Take your rescue medicine again and    Call 911 or go to the emergency room right away    See your regular doctor within 2 weeks of an Emergency Room  or Urgent Care visit for follow-up treatment.        Electronically signed by: Lisandra Simon, August 7, 2017    Annual Reminders:  Meet with Asthma Educator,  Flu Shot in the Fall, consider Pneumonia Vaccination for patients with asthma (aged 19 and older).    Pharmacy: [place], [place] - [address] [place] [address]. [address]                    Asthma Triggers  How To Control Things That Make Your Asthma Worse    Triggers are things that make your asthma worse.  Look at the list below to help you find your triggers and what you can do about them.  You can help prevent asthma flare-ups by staying away from your triggers.      Trigger                                                          What you can do   Cigarette Smoke  Tobacco smoke can make asthma worse. Do not allow smoking in your home, car or around you.  Be sure no one smokes at a child s day care or school.  If you smoke, ask your health care provider for ways to help you quit.  Ask family members to quit too.  Ask your health care provider for a referral to Quit Plan to help you quit smoking, or call 7-763-317-PLAN.     Colds, Flu, Bronchitis  These are common triggers of asthma. Wash your hands often.  Don t touch your eyes, nose or mouth.  Get a flu shot every year.     Dust Mites  These are tiny bugs that live in cloth or carpet. They are too small to see. Wash sheets and blankets in hot water every week.   Encase pillows and mattress in dust mite proof covers.  Avoid having carpet if you can. If you have carpet, vacuum weekly.   Use a dust mask and HEPA vacuum.   Pollen and Outdoor Mold  Some people are allergic to trees, grass, or weed pollen, or molds. Try to keep your windows closed.  Limit time out doors when pollen count is high.   Ask you health care provider about taking medicine during allergy season.     Animal Dander  Some people are allergic to skin flakes, urine or saliva from pets with fur or feathers. Keep pets  with fur or feathers out of your home.    If you can t keep the pet outdoors, then keep the pet out of your bedroom.  Keep the bedroom door closed.  Keep pets off cloth furniture and away from stuffed toys.     Mice, Rats, and Cockroaches  Some people are allergic to the waste from these pests.   Cover food and garbage.  Clean up spills and food crumbs.  Store grease in the refrigerator.   Keep food out of the bedroom.   Indoor Mold  This can be a trigger if your home has high moisture. Fix leaking faucets, pipes, or other sources of water.   Clean moldy surfaces.  Dehumidify basement if it is damp and smelly.   Smoke, Strong Odors, and Sprays  These can reduce air quality. Stay away from strong odors and sprays, such as perfume, powder, hair spray, paints, smoke incense, paint, cleaning products, candles and new carpet.   Exercise or Sports  Some people with asthma have this trigger. Be active!  Ask your doctor about taking medicine before sports or exercise to prevent symptoms.    Warm up for 5-10 minutes before and after sports or exercise.     Other Triggers of Asthma  Cold air:  Cover your nose and mouth with a scarf.  Sometimes laughing or crying can be a trigger.  Some medicines and food can trigger asthma.

## 2017-08-07 NOTE — PROGRESS NOTES
Screening Questionnaire for Adult Immunization    Are you sick today?   No   Do you have allergies to medications, food, a vaccine component or latex?   No   Have you ever had a serious reaction after receiving a vaccination?   No   Do you have a long-term health problem with heart disease, lung disease, asthma, kidney disease, metabolic disease (e.g. diabetes), anemia, or other blood disorder?   No   Do you have cancer, leukemia, HIV/AIDS, or any other immune system problem?   No   In the past 3 months, have you taken medications that affect  your immune system, such as prednisone, other steroids, or anticancer drugs; drugs for the treatment of rheumatoid arthritis, Crohn s disease, or psoriasis; or have you had radiation treatments?   No   Have you had a seizure, or a brain or other nervous system problem?   No   During the past year, have you received a transfusion of blood or blood     products, or been given immune (gamma) globulin or antiviral drug?   No   For women: Are you pregnant or is there a chance you could become        pregnant during the next month?   No   Have you received any vaccinations in the past 4 weeks?   No     Immunization questionnaire answers were all negative.      MNVFC doesn't apply on this patient    Per orders of Dr. Franks, injection of Zostavax given by Lisandra Simon. Patient instructed to remain in clinic for 15 minutes afterwards, and to report any adverse reaction to me immediately.       Screening performed by Lisandra Simon on 8/7/2017 at 1:58 PM.

## 2017-08-08 RX ORDER — TRAZODONE HYDROCHLORIDE 50 MG/1
TABLET, FILM COATED ORAL
Qty: 60 TABLET | Refills: 11 | Status: SHIPPED | OUTPATIENT
Start: 2017-08-08 | End: 2018-02-06

## 2017-08-14 ENCOUNTER — TELEPHONE (OUTPATIENT)
Dept: FAMILY MEDICINE | Facility: CLINIC | Age: 62
End: 2017-08-14

## 2017-08-14 NOTE — TELEPHONE ENCOUNTER
Our goal is to have forms completed within 72 hours, however some forms may require a visit or additional information.    What clinic location was the form placed at [facility] or [place].?     Who is the form from? [facility]  Where did the form come from? Faxed to clinic   The form was placed in the inbox of Addison Franks Jr MD      Please fax to [phone]    Additional comments:     Call take on 8/14/2017 at 1:23 PM by Birdie Anderson

## 2017-08-15 DIAGNOSIS — J41.0 SIMPLE CHRONIC BRONCHITIS (H): ICD-10-CM

## 2017-08-15 DIAGNOSIS — J45.30 ASTHMA, CHRONIC, MILD PERSISTENT, UNCOMPLICATED: ICD-10-CM

## 2017-08-15 NOTE — TELEPHONE ENCOUNTER
FLUTICASONE PROP 50 MCG SPR 50 BARRIE      Last Written Prescription Date: 08/01/16  Last Fill Quantity: 15g,  # refills: 11   Last Office Visit with [facility], [facility] or [facility] prescribing provider: 08/07/17

## 2017-08-18 RX ORDER — FLUTICASONE PROPIONATE 50 MCG
SPRAY, SUSPENSION (ML) NASAL
Qty: 16 G | Refills: 11 | Status: SHIPPED | OUTPATIENT
Start: 2017-08-18 | End: 2018-08-06

## 2017-08-18 NOTE — TELEPHONE ENCOUNTER
(J45.30) Asthma, chronic, mild persistent, uncomplicated  Comment:    Plan: fluticasone (FLONASE) 50 MCG/ACT spray             (J41.0) Simple chronic bronchitis (H)  Comment:    Plan: fluticasone (FLONASE) 50 MCG/ACT spray         1-2 PUFFS TWICE DAILY PRN   MEDICATION SENT X 11 REFILLS

## 2017-08-18 NOTE — TELEPHONE ENCOUNTER
Prescription approved per [facility] Refill Protocol for 12 months of refills since last appointment, which was 8/7/17    Routing refill request to provider for review/approval because:  Drug interaction warning

## 2017-08-30 DIAGNOSIS — E78.00 HIGH CHOLESTEROL: ICD-10-CM

## 2017-08-30 RX ORDER — SIMVASTATIN 10 MG
10 TABLET ORAL AT BEDTIME
Qty: 30 TABLET | Refills: 0 | Status: SHIPPED | OUTPATIENT
Start: 2017-08-30 | End: 2019-08-08

## 2017-08-30 NOTE — TELEPHONE ENCOUNTER
Routing refill request to provider for review/approval because:  Drug interaction warning-   ketoconazole and simvastatin      simvastatin (ZOCOR) 10 MG tablet    Last Written Prescription Date: 08/01/2016  Last Fill Quantity: 30, # refills: 11  Last Office Visit with [facility], [facility] or [facility] prescribing provider: 08/07/2017  Next 5 appointments (look out 90 days)     Nov 09, 2017  1:00 PM CST   [facility] Addison [facility]   [facility]    [address] [place] [id number] [phone]                   Lab Results   Component Value Date    CHOL 143 08/01/2016     Lab Results   Component Value Date    HDL 44 08/01/2016     Lab Results   Component Value Date    LDL 66 08/01/2016     Lab Results   Component Value Date    TRIG 163 08/01/2016     Lab Results   Component Value Date    CHOLHDLRATIO 2.8 06/11/2013

## 2017-08-30 NOTE — TELEPHONE ENCOUNTER
simvastatin (ZOCOR) 10 MG tablet  Last Written Prescription Date: 08/01/2016  Last Fill Quantity: 30, # refills: 11  Last Office Visit with [facility], [facility] or [facility] prescribing provider: 08/07/2017  Next 5 appointments (look out 90 days)     Nov 09, 2017  1:00 PM CST   Office Visit with Addison Franks MD   [facility]    [address] [place] [id number] [phone]                   Lab Results   Component Value Date    CHOL 143 08/01/2016     Lab Results   Component Value Date    HDL 44 08/01/2016     Lab Results   Component Value Date    LDL 66 08/01/2016     Lab Results   Component Value Date    TRIG 163 08/01/2016     Lab Results   Component Value Date    CHOLHDLRATIO 2.8 06/11/2013

## 2017-09-14 DIAGNOSIS — G25.81 RESTLESS LEGS SYNDROME (RLS): ICD-10-CM

## 2017-09-14 RX ORDER — CARBIDOPA AND LEVODOPA 25; 100 MG/1; MG/1
1 TABLET ORAL AT BEDTIME
Qty: 90 TABLET | Refills: 3 | Status: SHIPPED | OUTPATIENT
Start: 2017-09-14 | End: 2018-09-27

## 2017-09-14 NOTE — TELEPHONE ENCOUNTER
carbidopa-levodopa (SINEMET)  MG per tablet      Last Written Prescription Date:  8/1/16  Last Fill Quantity: 90,   # refills: 3  Last Office Visit with [facility]ON[facility], [facility] or [facility] prescribing provider: 8/7/17  Future Office visit:    Next 5 appointments (look out 90 days)     Nov 09, 2017  1:00 PM CST   Office Visit with Addison Franks MD   [facility]    [address] [place] [address] [phone]                   Routing refill request to provider for review/approval because:  Drug not on the [facility] or [facility] refill protocol or controlled substance

## 2017-10-06 DIAGNOSIS — Z11.59 NEED FOR HEPATITIS C SCREENING TEST: ICD-10-CM

## 2017-10-09 RX ORDER — RAMELTEON 8 MG/1
TABLET, FILM COATED ORAL
Qty: 30 TABLET | Refills: 1 | Status: SHIPPED | OUTPATIENT
Start: 2017-10-09 | End: 2018-02-06

## 2017-10-19 DIAGNOSIS — G89.29 CHRONIC LEFT-SIDED LOW BACK PAIN WITHOUT SCIATICA: ICD-10-CM

## 2017-10-19 DIAGNOSIS — M54.50 CHRONIC LEFT-SIDED LOW BACK PAIN WITHOUT SCIATICA: ICD-10-CM

## 2017-10-19 RX ORDER — CYCLOBENZAPRINE HCL 5 MG
TABLET ORAL
Qty: 60 TABLET | Refills: 3 | Status: SHIPPED | OUTPATIENT
Start: 2017-10-19 | End: 2018-04-29

## 2017-10-19 NOTE — TELEPHONE ENCOUNTER
cyclobenzaprine (FLEXERIL) 5 MG tablet      Last Written Prescription Date:  4/13/17  Last Fill Quantity: 60,   # refills: 3  Future Office visit:    Next 5 appointments (look out 90 days)     Nov 09, 2017  1:00 PM CST   PHYSICAL with Addison Franks MD   [facility]    [address] [place] [id number] [phone]                   Routing refill request to provider for review/approval because:  Drug not on the [facility], [facility] or [facility] refill protocol or controlled substance    Last office visit: 8/7/17

## 2017-10-23 ENCOUNTER — TRANSFERRED RECORDS (OUTPATIENT)
Dept: HEALTH INFORMATION MANAGEMENT | Facility: CLINIC | Age: 62
End: 2017-10-23

## 2017-11-09 ENCOUNTER — RADIANT APPOINTMENT (OUTPATIENT)
Dept: GENERAL RADIOLOGY | Facility: CLINIC | Age: 62
End: 2017-11-09
Attending: FAMILY MEDICINE
Payer: COMMERCIAL

## 2017-11-09 ENCOUNTER — OFFICE VISIT (OUTPATIENT)
Dept: FAMILY MEDICINE | Facility: CLINIC | Age: 62
End: 2017-11-09
Payer: COMMERCIAL

## 2017-11-09 VITALS
WEIGHT: 161 LBS | HEART RATE: 96 BPM | SYSTOLIC BLOOD PRESSURE: 118 MMHG | OXYGEN SATURATION: 97 % | HEIGHT: 66 IN | BODY MASS INDEX: 25.88 KG/M2 | DIASTOLIC BLOOD PRESSURE: 74 MMHG | TEMPERATURE: 96.8 F | RESPIRATION RATE: 16 BRPM

## 2017-11-09 DIAGNOSIS — J41.0 SIMPLE CHRONIC BRONCHITIS (H): Primary | ICD-10-CM

## 2017-11-09 DIAGNOSIS — Z00.00 ROUTINE HISTORY AND PHYSICAL EXAMINATION OF ADULT: ICD-10-CM

## 2017-11-09 DIAGNOSIS — F51.04 PSYCHOPHYSIOLOGIC INSOMNIA: ICD-10-CM

## 2017-11-09 DIAGNOSIS — G89.29 CHRONIC PAIN OF BOTH KNEES: Chronic | ICD-10-CM

## 2017-11-09 DIAGNOSIS — F33.0 MILD EPISODE OF RECURRENT MAJOR DEPRESSIVE DISORDER (H): Chronic | ICD-10-CM

## 2017-11-09 DIAGNOSIS — M54.2 NECK PAIN: ICD-10-CM

## 2017-11-09 DIAGNOSIS — J20.9 ACUTE BRONCHITIS, UNSPECIFIED ORGANISM: ICD-10-CM

## 2017-11-09 DIAGNOSIS — Z12.5 SPECIAL SCREENING FOR MALIGNANT NEOPLASM OF PROSTATE: ICD-10-CM

## 2017-11-09 DIAGNOSIS — E78.5 HYPERLIPIDEMIA WITH TARGET LDL LESS THAN 130: Chronic | ICD-10-CM

## 2017-11-09 DIAGNOSIS — M54.2 CERVICALGIA: ICD-10-CM

## 2017-11-09 DIAGNOSIS — M25.561 CHRONIC PAIN OF BOTH KNEES: Chronic | ICD-10-CM

## 2017-11-09 DIAGNOSIS — M25.562 CHRONIC PAIN OF BOTH KNEES: Chronic | ICD-10-CM

## 2017-11-09 DIAGNOSIS — L28.0 NEURODERMATITIS: ICD-10-CM

## 2017-11-09 DIAGNOSIS — M51.379 DEGENERATION OF LUMBAR OR LUMBOSACRAL INTERVERTEBRAL DISC: ICD-10-CM

## 2017-11-09 DIAGNOSIS — M50.30 DEGENERATION OF CERVICAL INTERVERTEBRAL DISC: Chronic | ICD-10-CM

## 2017-11-09 PROCEDURE — 99396 PREV VISIT EST AGE 40-64: CPT | Performed by: FAMILY MEDICINE

## 2017-11-09 PROCEDURE — 36415 COLL VENOUS BLD VENIPUNCTURE: CPT | Performed by: FAMILY MEDICINE

## 2017-11-09 PROCEDURE — 83721 ASSAY OF BLOOD LIPOPROTEIN: CPT | Performed by: FAMILY MEDICINE

## 2017-11-09 PROCEDURE — G0103 PSA SCREENING: HCPCS | Performed by: FAMILY MEDICINE

## 2017-11-09 PROCEDURE — 71020 XR CHEST 2 VW: CPT

## 2017-11-09 PROCEDURE — 84450 TRANSFERASE (AST) (SGOT): CPT | Performed by: FAMILY MEDICINE

## 2017-11-09 RX ORDER — HYDROCODONE BITARTRATE AND ACETAMINOPHEN 7.5; 325 MG/1; MG/1
1 TABLET ORAL EVERY 4 HOURS PRN
Qty: 100 TABLET | Refills: 0 | Status: SHIPPED | OUTPATIENT
Start: 2018-01-09 | End: 2018-02-06

## 2017-11-09 RX ORDER — HYDROCODONE BITARTRATE AND ACETAMINOPHEN 7.5; 325 MG/1; MG/1
1 TABLET ORAL EVERY 4 HOURS PRN
Qty: 100 TABLET | Refills: 0 | Status: SHIPPED | OUTPATIENT
Start: 2017-12-09 | End: 2017-11-09

## 2017-11-09 RX ORDER — AZITHROMYCIN 500 MG/1
500 TABLET, FILM COATED ORAL DAILY
Qty: 3 TABLET | Refills: 0 | Status: SHIPPED | OUTPATIENT
Start: 2017-11-09 | End: 2018-02-06

## 2017-11-09 RX ORDER — HYDROCODONE BITARTRATE AND ACETAMINOPHEN 7.5; 325 MG/1; MG/1
1 TABLET ORAL EVERY 4 HOURS PRN
Qty: 100 TABLET | Refills: 0 | Status: SHIPPED | OUTPATIENT
Start: 2017-11-09 | End: 2017-11-09

## 2017-11-09 RX ORDER — PREDNISONE 20 MG/1
20 TABLET ORAL 2 TIMES DAILY
Qty: 14 TABLET | Refills: 0 | Status: SHIPPED | OUTPATIENT
Start: 2017-11-09 | End: 2018-02-06

## 2017-11-09 ASSESSMENT — PATIENT HEALTH QUESTIONNAIRE - PHQ9: SUM OF ALL RESPONSES TO PHQ QUESTIONS 1-9: 0

## 2017-11-09 NOTE — LETTER
[facility]  [address]  [address]  [facility] [address]  [phone]                                                                                                           [place] [address] [place] [address]    November 10, 2017      Dear Claude,    NORMAL CHEST XRAY CONGRATULATIONS   Enclosed is a copy of the results.   Results for orders placed or performed in visit on 11/09/17   XR Chest 2 Views    Narrative    CHEST TWO VIEWS  11/9/2017 2:04 PM     HISTORY: 62-year-old with bronchitis.       Impression    IMPRESSION: Since May 9, 2016, heart size is normal. No pleural  effusion, pneumothorax, or abnormal area of consolidation.  Inferolateral left hemithorax is excluded from field-of-view.    JOHANA VILLAFANA MD         Thank you for choosing [facility].  We appreciate the opportunity to serve you and look forward to supporting your healthcare needs in the future.    If you have any questions or concerns, please call me or my staff at [phone].      Sincerely,    Addison Franks Jr MD

## 2017-11-09 NOTE — LETTER
"      [facility]  [address]  [address]  [facility] [id number] [phone]                                                                                                           Claude D Feinstein [address] [place] [address]    November 13, 2017      Dear Claude,    NORMAL PSA OR PROSTATE CANCER SCREENING TEST,     NORMAL LDL OR \"BAD\" CHOLESTEROL   NORMAL LIVER FUNCTION TEST   KEEP ON CHOLESTEROL MEDICATIONS   Results for orders placed or performed in visit on 11/09/17   XR Chest 2 Views    Narrative    CHEST TWO VIEWS  11/9/2017 2:04 PM     HISTORY: 62-year-old with bronchitis.       Impression    IMPRESSION: Since May 9, 2016, heart size is normal. No pleural  effusion, pneumothorax, or abnormal area of consolidation.  Inferolateral left hemithorax is excluded from field-of-view.    JOHANA VILLAFANA MD   Prostate spec antigen screen   Result Value Ref Range    PSA 1.76 0 - 4 ug/L   LDL cholesterol direct   Result Value Ref Range    LDL Cholesterol Direct 93 <100 mg/dL   AST   Result Value Ref Range    AST 25 0 - 45 U/L       Thank you for choosing [facility].  We appreciate the opportunity to serve you and look forward to supporting your healthcare needs in the future.    If you have any questions or concerns, please call me or my staff at [phone].    Sincerely,  Addison Franks Jr MD    "

## 2017-11-09 NOTE — LETTER
[facility]    11/09/17    Patient: Claude D Feinstein  YOB: 1955  Medical Record Number: [id number]                                                                  Controlled Substance Agreement  I understand that my care provider has prescribed controlled substances (narcotics, tranquilizers, and/or stimulants) to help manage my condition(s).  I am taking this medicine to help me function or work.  I know that this is strong medicine.  It could have serious side effects and even cause a dependency on the drug.  If I stop these medicines suddenly, I could have severe withdrawal symptoms.    The risks, benefits, and side effects of these medication(s) were explained to me.  I agree that:  1. I will take part in other treatments as advised by my provider.  This may be psychiatry or counseling, physical therapy, behavioral therapy, group treatment, or a referral to a pain clinic.  I will reduce or stop my medicine when my provider tells me to do so.   2. I will take my medicines as prescribed.  I will not change the dose or schedule unless my provider tells me to.  There will be no refills if I  run out early.   I may be contacted at any time without warning and asked to complete a drug test or pill count.   3. I will keep all my appointments at the clinic.  If I miss appointments or fail to follow instructions, my provider may stop my medicine.  4. I will not ask other providers to prescribe controlled substances. And I will not accept controlled substances from other people. If I need another prescribed controlled substance for a new reason, I will notify my provider within one business day.  5. If I enroll in the [place] Medical Marijuana program, I will tell my provider.  I will also sign an agreement to share my medical records with my provider.  6. I will use one pharmacy to fill all of my controlled substance prescriptions.  If my prescription is mailed to my  pharmacy, it may take 5 to 7 days for my medicine to be ready.  7. I understand that my provider, clinic care team, and pharmacy can track controlled substance prescriptions from other providers through a central database (prescription monitoring program).  8. I will bring in my list of medications (or my medicine bottles) each time I come to the clinic.  REV- 04/2016                                                                                                                                            Page 1 of 2      [facility]    11/09/17    Patient: Claude D Feinstein  YOB: 1955  Medical Record Number: [id number]    9. Refills of controlled substances will be made only during office hours.  It is up to me to make sure that I do not run out of my medicines on weekends or holidays.    10. I am responsible for my prescriptions.  If the medicine is lost or stolen, it will not be replaced.   I also agree not to share these medicines with anyone.  11. I agree to not use ANY illegal or recreational drugs.  This includes marijuana, cocaine, bath salts or other drugs.  I agree not to use alcohol unless my provider says I may.  I agree to give urine samples whenever asked.  If I fail to give a urine sample, the provider may stop my medicine.     12. I will tell my nurse or provider right away if I become pregnant or have a new medical problem treated outside of [facility].  13. I understand that this medicine can affect my thinking and judgment.  It may be unsafe for me to drive, use machinery and do dangerous tasks.  I will not do any of these things until I know how the medicine affects me.  If my dose changes, I will wait to see how it affects me.  I will contact my provider if I have concerns about medicine side effects.  I understand that if I do not follow any of the conditions above, my prescriptions or treatment may be stopped.    I agree that my provider,  clinic care team, and pharmacy may work with any city, state or federal law enforcement agency that investigates the misuse, sale, or other diversion of my controlled medicine. I will allow my provider to discuss my care with or share a copy of this agreement with any other treating provider, pharmacy or emergency room where I receive care.  I agree to give up (waive) any right of privacy or confidentiality with respect to these authorizations.   I have read this agreement and have asked questions about anything I did not understand.   ___________________________________    ___________________________  Patient Signature                                                           Date and Time  ___________________________________     ____________________________  Witness                                                                            Date and Time  ___________________________________  JEANNIE LO MD  REV-  04/2016                                                                                                                                                                 Page 2 of 2

## 2017-11-09 NOTE — NURSING NOTE
"Chief Complaint   Patient presents with     Physical       Initial /74  Pulse 96  Temp 96.8  F (36  C) (Tympanic)  Resp 16  Ht 5' 5.5\" (1.664 m)  Wt 161 lb (73 kg)  SpO2 97%  BMI 26.38 kg/m2 Estimated body mass index is 26.38 kg/(m^2) as calculated from the following:    Height as of this encounter: 5' 5.5\" (1.664 m).    Weight as of this encounter: 161 lb (73 kg).  Medication Reconciliation: complete   Lisandra Simon CMA    "

## 2017-11-09 NOTE — MR AVS SNAPSHOT
"              After Visit Summary   11/9/2017    Claude D [facility]    MRN: [id number]           Patient Information     Date Of Birth          1955        Visit Information        Provider Department      11/9/2017 1:00 PM Addison Franks MD [facility]        Today's Diagnoses     Simple chronic bronchitis (H)    -  1    Degeneration of lumbar or lumbosacral intervertebral disc        Cervicalgia        Routine history and physical examination of adult        Hyperlipidemia with target LDL less than 130        Mild episode of recurrent major depressive disorder (H)        Chronic pain of both knees        Degeneration of cervical intervertebral disc        Psychophysiologic insomnia        Neck pain        Acute bronchitis, unspecified organism        Special screening for malignant neoplasm of prostate           Follow-ups after your visit        Who to contact     If you have questions or need follow up information about today's clinic visit or your schedule please contact [facility] directly at [phone].  Normal or non-critical lab and imaging results will be communicated to you by MyChart, letter or phone within 4 business days after the clinic has received the results. If you do not hear from us within 7 days, please contact the clinic through [employer] or phone. If you have a critical or abnormal lab result, we will notify you by phone as soon as possible.  Submit refill requests through [employer] or call your pharmacy and they will forward the refill request to us. Please allow 3 business days for your refill to be completed.          Additional Information About Your Visit        MyChart Information     [employer] lets you send messages to your doctor, view your test results, renew your prescriptions, schedule appointments and more. To sign up, go to [email or web address] . Click on \"Log in\" on the left side of the screen, " "which will take you to the Welcome page. Then click on \"Sign up Now\" on the right side of the page.     You will be asked to enter the access code listed below, as well as some personal information. Please follow the directions to create your username and password.     Your access code is: [email or web address]  Expires: 2018  9:56 PM     Your access code will  in 90 days. If you need help or a new code, please call your [place] clinic or [phone].        Care EveryWhere ID     This is your Care EveryWhere ID. This could be used by other organizations to access your [facility] medical records  [id number]        Your Vitals Were     Pulse Temperature Respirations Height Pulse Oximetry BMI (Body Mass Index)    96 96.8  F (36  C) (Tympanic) 16 5' 5.5\" (1.664 m) 97% 26.38 kg/m2       Blood Pressure from Last 3 Encounters:   17 118/74   17 116/64   17 124/64    Weight from Last 3 Encounters:   17 161 lb (73 kg)   17 176 lb 8 oz (80.1 kg)   17 182 lb 8 oz (82.8 kg)              We Performed the Following     AST     LDL cholesterol direct     Prostate spec antigen screen     XR Chest 2 Views          Today's Medication Changes          These changes are accurate as of: 17  9:56 PM.  If you have any questions, ask your nurse or doctor.               Start taking these medicines.        Dose/Directions    azithromycin 500 MG tablet   Commonly known as:  ZITHROMAX   Used for:  Acute bronchitis, unspecified organism   Started by:  Addison Franks MD        Dose:  500 mg   Take 1 tablet (500 mg) by mouth daily   Quantity:  3 tablet   Refills:  0       HYDROcodone-acetaminophen 7.5-325 MG per tablet   Commonly known as:  NORCO   Used for:  Degeneration of lumbar or lumbosacral intervertebral disc, Cervicalgia   Started by:  Addison Franks MD        Dose:  1 tablet   Start taking on:  2018   Take 1 tablet by mouth every 4 hours as needed for pain maximum 3-4 " tablet(s) per day   Quantity:  100 tablet   Refills:  0       predniSONE 20 MG tablet   Commonly known as:  DELTASONE   Used for:  Acute bronchitis, unspecified organism   Started by:  Addison Franks MD        Dose:  20 mg   Take 1 tablet (20 mg) by mouth 2 times daily   Quantity:  14 tablet   Refills:  0         These medicines have changed or have updated prescriptions.        Dose/Directions    * order for DME   This may have changed:  Another medication with the same name was added. Make sure you understand how and when to take each.   Used for:  Post-traumatic osteoarthritis of left ankle   Changed by:  Addison Franks MD        Bilateral  DEEP SHOES WITH  WITH LONGITUDINAL ARCH SUPPORT FOR NODULAR PLANTAR FASCIITIS   Quantity:  1 each   Refills:  1       * order for DME   This may have changed:  You were already taking a medication with the same name, and this prescription was added. Make sure you understand how and when to take each.   Used for:  Simple chronic bronchitis (H)   Changed by:  Addison Franks MD        NEBULIZER SUPPLY MANUELA LC  SPRINT  REVISABLE NEBULIZER SET  [facility] Address: [facility] [address], [place] [address] Phone: [phone]   Quantity:  1 each   Refills:  3       * Notice:  This list has 2 medication(s) that are the same as other medications prescribed for you. Read the directions carefully, and ask your doctor or other care provider to review them with you.         Where to get your medicines      These medications were sent to [facility] [place] - [address] [place] [address], [place] [address]     Phone:  [phone]     azithromycin 500 MG tablet    predniSONE 20 MG tablet         Some of these will need a paper prescription and others can be bought over the counter.  Ask your nurse if you have questions.     Bring a paper prescription for each of  these medications     HYDROcodone-acetaminophen 7.5-325 MG per tablet    order for DME                Primary Care Provider Office Phone # Fax #    Addison Linh Franks [phone] [address] [facility] [address]        Equal Access to Services     ALEXSANDRA REDMOND : Hadii aad ku hadasho Soomaali, waaxda luqadaha, qaybta kaalmada adeegyada, waxay idiin hayaan adeeg ashleebrenda laagnieszka king. So [facility] [phone].    ATENCIÓN: Si habla español, tiene a garvey disposición servicios gratuitos de asistencia lingüística. Llame al [phone].    We comply with applicable federal civil rights laws and [place] laws. We do not discriminate on the basis of race, color, national origin, age, disability, sex, sexual orientation, or gender identity.            Thank you!     Thank you for choosing [facility]  for your care. Our goal is always to provide you with excellent care. Hearing back from our patients is one way we can continue to improve our services. Please take a few minutes to complete the written survey that you may receive in the mail after your visit with us. Thank you!             Your Updated Medication List - Protect others around you: Learn how to safely use, store and throw away your medicines at www.disposemymeds.org.          This list is accurate as of: 11/9/17  9:56 PM.  Always use your most recent med list.                   Brand Name Dispense Instructions for use Diagnosis    * albuterol (2.5 MG/3ML) 0.083% neb solution     360 mL    NEBULIZE AND INHALE 1 VIAL EVERY 6 HOURS AS NEEDED FOR SHORTNESS OF BREATH/DYSPNEA OR WHEEZING.    Simple chronic bronchitis (H)       * albuterol 108 (90 BASE) MCG/ACT Inhaler    PROAIR HFA/PROVENTIL HFA/VENTOLIN HFA    1 Inhaler    Inhale 2 puffs into the lungs every 6 hours as needed for shortness of breath / dyspnea or wheezing    Simple chronic bronchitis (H)       azithromycin 500 MG tablet    ZITHROMAX    3  tablet    Take 1 tablet (500 mg) by mouth daily    Acute bronchitis, unspecified organism       carbidopa-levodopa  MG per tablet    SINEMET    90 tablet    Take 1 tablet by mouth At Bedtime    Restless legs syndrome (RLS)       cyclobenzaprine 5 MG tablet    FLEXERIL    60 tablet    TAKE 1 TABLET BY MOUTH TWICE DAILY AS NEEDED FOR MUSCLE SPASMS.    Chronic left-sided low back pain without sciatica       fluticasone 50 MCG/ACT spray    FLONASE    16 g    INSTILL 1 TO 2 SPRAYS IN EACH NOSTRIL DAILY.    Asthma, chronic, mild persistent, uncomplicated, Simple chronic bronchitis (H)       HYDROcodone-acetaminophen 7.5-325 MG per tablet   Start taking on:  1/9/2018    NORCO    100 tablet    Take 1 tablet by mouth every 4 hours as needed for pain maximum 3-4 tablet(s) per day    Degeneration of lumbar or lumbosacral intervertebral disc, Cervicalgia       hydrOXYzine 25 MG capsule    VISTARIL    270 capsule    Take 1 capsule (25 mg) by mouth 3 times daily as needed for itching    Mixed anxiety depressive disorder       ketoconazole 2 % cream    NIZORAL    30 g    APPLY TOPICALLY TWICE DAILY.    Scrotal rash       lidocaine 4 % Crea cream    ASPERCREME W/LIDOCAINE    120 g    Apply topically once as needed for mild pain    Chronic pain syndrome, Chronic pain of both knees, Degeneration of lumbar or lumbosacral intervertebral disc, Cervicalgia       MAGNESIA PO      Take  by mouth.        Melatonin 10 MG Tbcr    MELATONIN TR    30 tablet    Take 1 tablet by mouth every evening    Primary insomnia       * mineral oil-white petrolatum Crea cream     454 g    Apply topically two times daily    Pruritic disorder       * eucerin cream     454 g    Apply topically as needed for dry skin    Dermatitis, dyshidrotic       * order for DME      autocpap 6-10 cm        * order for DME     1 Device    Equipment being ordered: LONGITUDINAL ARCH SUPPORTS   DIAGNOSIS PAINFUL ANKLES AND KNEES WITH OSTEOARTHRTIS ADVANCE  ONE PAIR USE AS  DIRECTED    Pain in joint involving ankle and foot, unspecified laterality       * order for DME     1 each    Bilateral  DEEP SHOES WITH  WITH LONGITUDINAL ARCH SUPPORT FOR NODULAR PLANTAR FASCIITIS    Post-traumatic osteoarthritis of left ankle       * order for DME     1 each    NEBULIZER SUPPLY MANUELA LC  SPRINT  REVISABLE NEBULIZER SET  [facility] Address: [facility] [address]  [address], [place] [address] Phone: [phone]    Simple chronic bronchitis (H)       predniSONE 20 MG tablet    DELTASONE    14 tablet    Take 1 tablet (20 mg) by mouth 2 times daily    Acute bronchitis, unspecified organism       ROZEREM 8 MG tablet   Generic drug:  ramelteon     30 tablet    TAKE 1 TABLET BY MOUTH AT BEDTIME.    Need for hepatitis C screening test       SAW PALMETTO FRUIT PO      Take  by mouth.        simvastatin 10 MG tablet    ZOCOR    30 tablet    Take 1 tablet (10 mg) by mouth At Bedtime    High cholesterol       tiotropium 18 MCG capsule    SPIRIVA HANDIHALER    90 capsule    Inhale contents of one capsule daily.    Simple chronic bronchitis (H)       traZODone 50 MG tablet    DESYREL    60 tablet    TAKE 2 TABLETS BY MOUTH EVERY NIGHT AS NEEDED FOR SLEEP.    Primary insomnia       triamcinolone 0.1 % cream    KENALOG    30 g    APPLY SPARINGLY TO THE AFFECTED AREA THREE TIMES A DAY AS NEEDED.    Rash       vitamin D 2000 UNITS Caps      Take 1 capsule by mouth daily.        * Notice:  This list has 8 medication(s) that are the same as other medications prescribed for you. Read the directions carefully, and ask your doctor or other care provider to review them with you.
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
## 2017-11-10 ENCOUNTER — TELEPHONE (OUTPATIENT)
Dept: FAMILY MEDICINE | Facility: CLINIC | Age: 62
End: 2017-11-10

## 2017-11-10 DIAGNOSIS — E78.00 HIGH CHOLESTEROL: ICD-10-CM

## 2017-11-10 LAB
AST SERPL W P-5'-P-CCNC: 25 U/L (ref 0–45)
LDLC SERPL DIRECT ASSAY-MCNC: 93 MG/DL
PSA SERPL-ACNC: 1.76 UG/L (ref 0–4)

## 2017-11-10 RX ORDER — SIMVASTATIN 10 MG
10 TABLET ORAL AT BEDTIME
Qty: 30 TABLET | Refills: 0 | Status: CANCELLED | OUTPATIENT
Start: 2017-11-10

## 2017-11-10 NOTE — TELEPHONE ENCOUNTER
Patient called requesting Rx for simvastatin and cream for rash on leg discussed at OV 11/09/2017. Pt states PCP should know what cream they discussed. Rx's can be sent to [facility] when approved. Rx for simvastatin pended. Please approve and add order for cream if appropriate.

## 2017-11-11 RX ORDER — SIMVASTATIN 10 MG
10 TABLET ORAL AT BEDTIME
Qty: 90 TABLET | Refills: 3 | Status: SHIPPED | OUTPATIENT
Start: 2017-11-11 | End: 2018-02-06

## 2017-11-11 RX ORDER — TRIAMCINOLONE ACETONIDE 1 MG/G
OINTMENT TOPICAL
Qty: 89 G | Refills: 3 | Status: SHIPPED | OUTPATIENT
Start: 2017-11-11 | End: 2019-05-09

## 2017-11-11 NOTE — PROGRESS NOTES
"Please send normal lab letter when labs are complete  NORMAL PSA OR PROSTATE CANCER SCREENING TEST,     NORMAL LDL OR \"BAD\" CHOLESTEROL   NORMAL LIVER FUNCTION TEST   KEEP ON CHOLESTEROL MEDICATIONS   JEANNIE LO JR., MD"

## 2017-11-11 NOTE — TELEPHONE ENCOUNTER
PLEASE NOTIFY   THIS IS DONE   SIMVASTATIN 10MG PO DAILY   #90 3 REFILLS ONE DAILY   TRIAMCINOLONE OINTMENT TWICE DAILY PRN   #80 GRAM   HOLD WHEN IN REMISSION  REFILL X 3   JEANNIE LO JR., MD

## 2017-11-14 ENCOUNTER — TELEPHONE (OUTPATIENT)
Dept: FAMILY MEDICINE | Facility: CLINIC | Age: 62
End: 2017-11-14

## 2017-11-14 DIAGNOSIS — J41.0 SIMPLE CHRONIC BRONCHITIS (H): ICD-10-CM

## 2017-11-14 NOTE — TELEPHONE ENCOUNTER
Our goal is to have forms completed within 72 hours, however some forms may require a visit or additional information.    What clinic location was the form placed at [facility] or [place].?     Who is the form from? [facility]  Where did the form come from? Faxed to clinic   The form was placed in the inbox of Addison Franks Jr MD      Please fax to [phone]    Additional comments:     Call take on 11/14/2017 at 11:01 AM by Birdie Anderson

## 2017-11-15 RX ORDER — ALBUTEROL SULFATE 90 UG/1
2 AEROSOL, METERED RESPIRATORY (INHALATION) EVERY 6 HOURS PRN
Qty: 1 INHALER | Refills: 11 | Status: SHIPPED | OUTPATIENT
Start: 2017-11-15 | End: 2019-01-23

## 2017-11-15 NOTE — TELEPHONE ENCOUNTER
Requested Prescriptions   Pending Prescriptions Disp Refills     albuterol (PROAIR HFA/PROVENTIL HFA/VENTOLIN HFA) 108 (90 BASE) MCG/ACT Inhaler 1 Inhaler 11     Sig: Inhale 2 puffs into the lungs every 6 hours as needed for shortness of breath / dyspnea or wheezing    Asthma Maintenance Inhalers - Anticholinergics Failed    11/14/2017  4:21 PM       Failed - Asthma control test score is 20 or greater in last 6 months    Please review ACT score.          Passed - Patient is age 12 years or older       Passed - Recent (6 mo) or future visit with authorizing provider's specialty    Patient had office visit in the last 6 months or has a visit in the next 30 days with authorizing provider.  See chart review.             Routing refill request to provider for review/approval because:  Last ACT = 25 on 5-8-17 which is over 6 months old

## 2017-12-19 ENCOUNTER — TELEPHONE (OUTPATIENT)
Dept: FAMILY MEDICINE | Facility: CLINIC | Age: 62
End: 2017-12-19

## 2017-12-19 DIAGNOSIS — F41.8 MIXED ANXIETY DEPRESSIVE DISORDER: ICD-10-CM

## 2017-12-19 NOTE — TELEPHONE ENCOUNTER
Our goal is to have forms completed within 72 hours, however some forms may require a visit or additional information.    What clinic location was the form placed at [facility] or [place].?     Who is the form from? [facility] - Phys Statement of Med Necessity  Where did the form come from? Faxed to clinic   The form was placed in the inbox of Addison Franks Jr MD      Please fax to [phone]    Additional comments:     Call take on 12/19/2017 at 9:25 AM by Birdie Anderson

## 2017-12-21 RX ORDER — HYDROXYZINE PAMOATE 25 MG/1
CAPSULE ORAL
Qty: 270 CAPSULE | Refills: 10 | Status: SHIPPED | OUTPATIENT
Start: 2017-12-21 | End: 2018-08-06

## 2017-12-21 NOTE — TELEPHONE ENCOUNTER
Last OV 11/09/2017.  Requested Prescriptions   Pending Prescriptions Disp Refills     hydrOXYzine (VISTARIL) 25 MG capsule [Pharmacy Med Name: HYDROXYZINE MARCO 25 MG CAP 25 CAP] 270 capsule 11     Sig: TAKE 1 CAPSULE BY MOUTH THREE TIMES DAILY AS NEEDED FOR ITCHING.    Antihistamines Protocol Passed    12/19/2017  2:51 PM       Passed - Patient is 3-64 years of age    Apply weight-based dosing for peds patients age 3 - 12 years of age.    Forward request to provider for patients under the age of 3 or over the age of 64.         Passed - Recent or future visit with authorizing provider's specialty    Patient had office visit in the last year or has a visit in the next 30 days with authorizing provider.  See chart review.               Prescription approved per [facility] Refill Protocol.

## 2018-02-06 ENCOUNTER — OFFICE VISIT (OUTPATIENT)
Dept: FAMILY MEDICINE | Facility: CLINIC | Age: 63
End: 2018-02-06
Payer: COMMERCIAL

## 2018-02-06 VITALS
WEIGHT: 157.5 LBS | OXYGEN SATURATION: 100 % | SYSTOLIC BLOOD PRESSURE: 124 MMHG | BODY MASS INDEX: 25.81 KG/M2 | DIASTOLIC BLOOD PRESSURE: 78 MMHG | TEMPERATURE: 96.9 F | RESPIRATION RATE: 16 BRPM | HEART RATE: 106 BPM

## 2018-02-06 DIAGNOSIS — R37 SEXUAL DYSFUNCTION: ICD-10-CM

## 2018-02-06 DIAGNOSIS — F33.0 MILD EPISODE OF RECURRENT MAJOR DEPRESSIVE DISORDER (H): Primary | Chronic | ICD-10-CM

## 2018-02-06 DIAGNOSIS — M50.30 DEGENERATION OF CERVICAL INTERVERTEBRAL DISC: Chronic | ICD-10-CM

## 2018-02-06 DIAGNOSIS — F51.04 PSYCHOPHYSIOLOGIC INSOMNIA: ICD-10-CM

## 2018-02-06 DIAGNOSIS — M54.2 CERVICALGIA: ICD-10-CM

## 2018-02-06 DIAGNOSIS — E78.5 HYPERLIPIDEMIA WITH TARGET LDL LESS THAN 130: Chronic | ICD-10-CM

## 2018-02-06 DIAGNOSIS — M51.379 DEGENERATION OF LUMBAR OR LUMBOSACRAL INTERVERTEBRAL DISC: ICD-10-CM

## 2018-02-06 PROCEDURE — 99214 OFFICE O/P EST MOD 30 MIN: CPT | Performed by: FAMILY MEDICINE

## 2018-02-06 RX ORDER — HYDROCODONE BITARTRATE AND ACETAMINOPHEN 7.5; 325 MG/1; MG/1
1 TABLET ORAL EVERY 4 HOURS PRN
Qty: 100 TABLET | Refills: 0 | Status: SHIPPED | OUTPATIENT
Start: 2018-04-08 | End: 2018-05-07

## 2018-02-06 RX ORDER — SILDENAFIL CITRATE 20 MG/1
60 TABLET ORAL DAILY PRN
Qty: 30 TABLET | Refills: 11 | Status: SHIPPED | OUTPATIENT
Start: 2018-02-06 | End: 2018-02-12

## 2018-02-06 RX ORDER — HYDROCODONE BITARTRATE AND ACETAMINOPHEN 7.5; 325 MG/1; MG/1
1 TABLET ORAL EVERY 4 HOURS PRN
Qty: 100 TABLET | Refills: 0 | Status: SHIPPED | OUTPATIENT
Start: 2018-02-08 | End: 2018-02-06

## 2018-02-06 RX ORDER — HYDROCODONE BITARTRATE AND ACETAMINOPHEN 7.5; 325 MG/1; MG/1
1 TABLET ORAL EVERY 4 HOURS PRN
Qty: 100 TABLET | Refills: 0 | Status: SHIPPED | OUTPATIENT
Start: 2018-03-08 | End: 2018-02-06

## 2018-02-06 ASSESSMENT — ANXIETY QUESTIONNAIRES
2. NOT BEING ABLE TO STOP OR CONTROL WORRYING: NOT AT ALL
6. BECOMING EASILY ANNOYED OR IRRITABLE: NOT AT ALL
7. FEELING AFRAID AS IF SOMETHING AWFUL MIGHT HAPPEN: NOT AT ALL
GAD7 TOTAL SCORE: 0
5. BEING SO RESTLESS THAT IT IS HARD TO SIT STILL: NOT AT ALL
IF YOU CHECKED OFF ANY PROBLEMS ON THIS QUESTIONNAIRE, HOW DIFFICULT HAVE THESE PROBLEMS MADE IT FOR YOU TO DO YOUR WORK, TAKE CARE OF THINGS AT HOME, OR GET ALONG WITH OTHER PEOPLE: NOT DIFFICULT AT ALL
3. WORRYING TOO MUCH ABOUT DIFFERENT THINGS: NOT AT ALL
1. FEELING NERVOUS, ANXIOUS, OR ON EDGE: NOT AT ALL

## 2018-02-06 ASSESSMENT — PATIENT HEALTH QUESTIONNAIRE - PHQ9: 5. POOR APPETITE OR OVEREATING: NOT AT ALL

## 2018-02-06 NOTE — MR AVS SNAPSHOT
After Visit Summary   2/6/2018    Claude D [facility]    MRN: [id number]           Patient Information     Date Of Birth          1955        Visit Information        Provider Department      2/6/2018 1:00 PM Addison Franks MD [facility]        Today's Diagnoses     Mild episode of recurrent major depressive disorder (H)    -  1    Degeneration of lumbar or lumbosacral intervertebral disc        Cervicalgia        Degeneration of cervical intervertebral disc        Hyperlipidemia with target LDL less than 130        Psychophysiologic insomnia        Sexual dysfunction          Care Instructions      (F33.0) Mild episode of recurrent major depressive disorder (H)  (primary encounter diagnosis)    Comment:      Plan:          (M51.37) Degeneration of lumbar or lumbosacral intervertebral disc    Comment:      Plan: HYDROcodone-acetaminophen (NORCO) 7.5-325 MG           per tablet, DISCONTINUED:           HYDROcodone-acetaminophen (NORCO) 7.5-325 MG           per tablet, DISCONTINUED:           HYDROcodone-acetaminophen (NORCO) 7.5-325 MG           per tablet                   (M54.2) Cervicalgia    Comment:      Plan: HYDROcodone-acetaminophen (NORCO) 7.5-325 MG           per tablet, DISCONTINUED:           HYDROcodone-acetaminophen (NORCO) 7.5-325 MG           per tablet, DISCONTINUED:           HYDROcodone-acetaminophen (NORCO) 7.5-325 MG           per tablet                   (M50.30) Degeneration of cervical intervertebral disc    Comment:      Plan:          (E78.5) Hyperlipidemia with target LDL less than 130    Comment:      Plan:          (F51.04) Psychophysiologic insomnia    Comment:      Plan:          (R37) Sexual dysfunction    Comment: male     Plan: sildenafil (REVATIO) 20 MG tablet                                 Follow-ups after your visit        Who to contact     If you have questions or need follow up information about today's clinic  "visit or your schedule please contact [facility] directly at [phone].  Normal or non-critical lab and imaging results will be communicated to you by MyChart, letter or phone within 4 business days after the clinic has received the results. If you do not hear from us within 7 days, please contact the clinic through MyChart or phone. If you have a critical or abnormal lab result, we will notify you by phone as soon as possible.  Submit refill requests through [employer] or call your pharmacy and they will forward the refill request to us. Please allow 3 business days for your refill to be completed.          Additional Information About Your Visit        MyChart Information     [employer] lets you send messages to your doctor, view your test results, renew your prescriptions, schedule appointments and more. To sign up, go to [email or web address] . Click on \"Log in\" on the left side of the screen, which will take you to the Welcome page. Then click on \"Sign up Now\" on the right side of the page.     You will be asked to enter the access code listed below, as well as some personal information. Please follow the directions to create your username and password.     Your access code is: [email or web address]  Expires: 2018  9:56 PM     Your access code will  in 90 days. If you need help or a new code, please call your [place] clinic or [phone].        Care EveryWhere ID     This is your Care EveryWhere ID. This could be used by other organizations to access your [place] medical records  [id number]        Your Vitals Were     Pulse Temperature Respirations Pulse Oximetry BMI (Body Mass Index)       106 96.9  F (36.1  C) (Tympanic) 16 100% 25.81 kg/m2        Blood Pressure from Last 3 Encounters:   18 124/78   17 118/74   17 116/64    Weight from Last 3 Encounters:   18 157 lb 8 oz (71.4 kg)   17 161 lb (73 kg)   17 176 lb 8 oz (80.1 kg)    "           Today, you had the following     No orders found for display         Today's Medication Changes          These changes are accurate as of 2/6/18  1:34 PM.  If you have any questions, ask your nurse or doctor.               Start taking these medicines.        Dose/Directions    HYDROcodone-acetaminophen 7.5-325 MG per tablet   Commonly known as:  NORCO   Used for:  Degeneration of lumbar or lumbosacral intervertebral disc, Cervicalgia   Started by:  Addison Franks MD        Dose:  1 tablet   Start taking on:  4/8/2018   Take 1 tablet by mouth every 4 hours as needed for pain maximum 3-4 tablet(s) per day   Quantity:  100 tablet   Refills:  0       sildenafil 20 MG tablet   Commonly known as:  REVATIO   Used for:  Sexual dysfunction   Started by:  Addison Franks MD        Dose:  60 mg   Take 3 tablets (60 mg) by mouth daily as needed   Quantity:  30 tablet   Refills:  11            Where to get your medicines      Some of these will need a paper prescription and others can be bought over the counter.  Ask your nurse if you have questions.     Bring a paper prescription for each of these medications     HYDROcodone-acetaminophen 7.5-325 MG per tablet    sildenafil 20 MG tablet                Primary Care Provider Office Phone # Fax #    Addison Franks [phone] [address] [facility] [address]        Equal Access to Services     ALEXSANDRA REDMOND AH: Hadjosh frankso Somary, waaxda luqadaha, qaybta kaalmada adeegyada, mary king. So [facility] [phone].    ATENCIÓN: Si habla español, tiene a garvey disposición servicios gratuitos de asistencia lingüística. Llame al [phone].    We comply with applicable federal civil rights laws and [place] laws. We do not discriminate on the basis of race, color, national origin, age, disability, sex, sexual orientation, or gender identity.            Thank you!     Thank you for  choosing [facility]  for your care. Our goal is always to provide you with excellent care. Hearing back from our patients is one way we can continue to improve our services. Please take a few minutes to complete the written survey that you may receive in the mail after your visit with us. Thank you!             Your Updated Medication List - Protect others around you: Learn how to safely use, store and throw away your medicines at www.disposemymeds.org.          This list is accurate as of 2/6/18  1:34 PM.  Always use your most recent med list.                   Brand Name Dispense Instructions for use Diagnosis    * albuterol (2.5 MG/3ML) 0.083% neb solution     360 mL    NEBULIZE AND INHALE 1 VIAL EVERY 6 HOURS AS NEEDED FOR SHORTNESS OF BREATH/DYSPNEA OR WHEEZING.    Simple chronic bronchitis (H)       * albuterol 108 (90 BASE) MCG/ACT Inhaler    PROAIR HFA/PROVENTIL HFA/VENTOLIN HFA    1 Inhaler    Inhale 2 puffs into the lungs every 6 hours as needed for shortness of breath / dyspnea or wheezing    Simple chronic bronchitis (H)       carbidopa-levodopa  MG per tablet    SINEMET    90 tablet    Take 1 tablet by mouth At Bedtime    Restless legs syndrome (RLS)       cyclobenzaprine 5 MG tablet    FLEXERIL    60 tablet    TAKE 1 TABLET BY MOUTH TWICE DAILY AS NEEDED FOR MUSCLE SPASMS.    Chronic left-sided low back pain without sciatica       fluticasone 50 MCG/ACT spray    FLONASE    16 g    INSTILL 1 TO 2 SPRAYS IN EACH NOSTRIL DAILY.    Asthma, chronic, mild persistent, uncomplicated, Simple chronic bronchitis (H)       HYDROcodone-acetaminophen 7.5-325 MG per tablet   Start taking on:  4/8/2018    NORCO    100 tablet    Take 1 tablet by mouth every 4 hours as needed for pain maximum 3-4 tablet(s) per day    Degeneration of lumbar or lumbosacral intervertebral disc, Cervicalgia       hydrOXYzine 25 MG capsule    VISTARIL    270 capsule    TAKE 1 CAPSULE BY MOUTH THREE  TIMES DAILY AS NEEDED FOR ITCHING.    Mixed anxiety depressive disorder       ketoconazole 2 % cream    NIZORAL    30 g    APPLY TOPICALLY TWICE DAILY.    Scrotal rash       lidocaine 4 % Crea cream    ASPERCREME W/LIDOCAINE    120 g    Apply topically once as needed for mild pain    Chronic pain syndrome, Chronic pain of both knees, Degeneration of lumbar or lumbosacral intervertebral disc, Cervicalgia       MAGNESIA PO      Take  by mouth.        Melatonin 10 MG Tbcr    MELATONIN TR    30 tablet    Take 1 tablet by mouth every evening    Primary insomnia       * mineral oil-white petrolatum Crea cream     454 g    Apply topically two times daily    Pruritic disorder       * eucerin cream     454 g    Apply topically as needed for dry skin    Dermatitis, dyshidrotic       * order for DME      autocpap 6-10 cm        * order for DME     1 Device    Equipment being ordered: LONGITUDINAL ARCH SUPPORTS   DIAGNOSIS PAINFUL ANKLES AND KNEES WITH OSTEOARTHRTIS ADVANCE  ONE PAIR USE AS DIRECTED    Pain in joint involving ankle and foot, unspecified laterality       * order for DME     1 each    Bilateral  DEEP SHOES WITH  WITH LONGITUDINAL ARCH SUPPORT FOR NODULAR PLANTAR FASCIITIS    Post-traumatic osteoarthritis of left ankle       * order for DME     1 each    NEBULIZER SUPPLY MANUELA LC  SPRINT  REVISABLE NEBULIZER SET  [facility] Address: [address] [place] [address]  [address], [place] [address] Phone: [phone]    Simple chronic bronchitis (H)       SAW PALMETTO FRUIT PO      Take  by mouth.        sildenafil 20 MG tablet    REVATIO    30 tablet    Take 3 tablets (60 mg) by mouth daily as needed    Sexual dysfunction       simvastatin 10 MG tablet    ZOCOR    30 tablet    Take 1 tablet (10 mg) by mouth At Bedtime    High cholesterol       tiotropium 18 MCG capsule    SPIRIVA HANDIHALER    90 capsule    Inhale contents of one capsule daily.    Simple chronic bronchitis (H)       * triamcinolone 0.1  % cream    KENALOG    30 g    APPLY SPARINGLY TO THE AFFECTED AREA THREE TIMES A DAY AS NEEDED.    Rash       * triamcinolone 0.1 % ointment    KENALOG    89 g    TWICE DAILY PRN AS NEEDED AND HOLD WHEN IN REMISSION    Neurodermatitis       vitamin D 2000 UNITS Caps      Take 1 capsule by mouth daily.        * Notice:  This list has 10 medication(s) that are the same as other medications prescribed for you. Read the directions carefully, and ask your doctor or other care provider to review them with you.

## 2018-02-06 NOTE — NURSING NOTE
"Chief Complaint   Patient presents with     Recheck Medication       Initial /78  Pulse 106  Temp 96.9  F (36.1  C) (Tympanic)  Resp 16  Wt 157 lb 8 oz (71.4 kg)  SpO2 100%  BMI 25.81 kg/m2 Estimated body mass index is 25.81 kg/(m^2) as calculated from the following:    Height as of 11/9/17: 5' 5.5\" (1.664 m).    Weight as of this encounter: 157 lb 8 oz (71.4 kg).  Medication Reconciliation: complete   Lisandra Simon CMA    "

## 2018-02-06 NOTE — PROGRESS NOTES
SUBJECTIVE:   Claude D Feinstein is a 62 year old male who presents to clinic today for the following health issues:      Medication Followup of all meds    Taking Medication as prescribed: yes    Side Effects:  None    Medication Helping Symptoms:  yes       Hyperlipidemia Follow-Up      Rate your low fat/cholesterol diet?: good    Taking statin?  Yes, no muscle aches from statin    Other lipid medications/supplements?:  none    Chronic Pain Follow-Up  Neck   LOWER BACK PAIN   OSTEOARTHRITIS ANKLES   SHOULDERS BILATERAL   BILATERAL TOTAL KNEE ARTHROPLASTY        Type / Location of Pain:  AS ABOVE   Analgesia/pain control:       Recent changes:  improved      Overall control: Tolerable with discomfort  Activity level/function:      Daily activities:  Able to do light housework, cooking    Work:  not applicable  Adverse effects:  No  Adherance    Taking medication as directed?  Yes    Participating in other treatments: yes  Risk Factors:    Sleep:  Good    Mood/anxiety:  controlled    Recent family or social stressors:  none noted    Other aggravating factors: none  PHQ-9 SCORE 11/7/2016 5/8/2017 11/9/2017   Total Score - - -   Total Score 0 0 0     RAKESH-7 SCORE 11/3/2015 11/7/2016 2/6/2018   Total Score 0 0 0     Encounter-Level CSA - 11/09/2017:          Controlled Substance Agreement - Scan on 11/21/2017  6:30 AM : CONTROLLED SUBSTANCE AGREEMENT (below)            Encounter-Level CSA - 11/09/2017:          Controlled Substance Agreement - Scan on 9/16/2015 11:59 AM : [place], Controlled Substance Contract, 09-08-15 (below)              Back Pain Follow Up      Description:   Location of pain:  center  Character of pain: dull ache  Pain radiation: Does not radiate  Since last visit, pain is:  improved  New numbness or weakness in legs, not attributed to pain:  no     Intensity: moderate    History:   Pain interferes with job: Not applicable  Therapies tried without relief: acetaminophen (Tylenol) and  massage  Therapies tried with relief: opioids and Physical Therapy           Accompanying Signs & Symptoms:  Risk of Fracture:  None  Risk of Cauda Equina:  None  Risk of Infection:  None  Risk of Cancer:  None        Problem list and histories reviewed & adjusted, as indicated.  Additional history: as documented      Patient Active Problem List   Diagnosis     Degeneration of cervical intervertebral disc     Restless legs syndrome (RLS)     Pruritic disorder     Degeneration of lumbar or lumbosacral intervertebral disc     Carpal tunnel syndrome     Obesity     External hemorrhoids     Primary localized osteoarthrosis, lower leg     Prurigo     Illiterate     DALTON (obstructive sleep apnea)- mild (AHI 5.9)     Hyperlipidemia with target LDL less than 130     Knee pain     Major depression, recurrent (H)     Major depressive disorder, recurrent episode, moderate (H)     Adjustment disorder with depressed mood     Neck pain     Major depression in partial remission (H)     Mixed anxiety depressive disorder     Advance Care Planning     Anxiety     Chronic pain syndrome     Other chronic pain     Midline low back pain with sciatica     Status post total bilateral knee replacement     Simple chronic bronchitis (H)     Psychophysiologic insomnia     Rash     Past Surgical History:   Procedure Laterality Date     ANKLE SURGERY  7/16/2007    synovectomy     CARPAL TUNNEL RELEASE RT/LT       FOOT SURGERY  1991    L bunionectomy     HEAD & NECK SURGERY  1958    plate in head     KNEE SURGERY  1991    R Knee Arthroscopy     ORTHOPEDIC SURGERY  1990    arm surgery laceration R forearm       Social History   Substance Use Topics     Smoking status: Current Every Day Smoker     Packs/day: 1.00     Types: Cigarettes     Last attempt to quit: 3/10/2015     Smokeless tobacco: Never Used     Alcohol use Yes     Family History   Problem Relation Age of Onset     Alzheimer Disease Mother          Current Outpatient Prescriptions    Medication Sig Dispense Refill     [START ON 4/8/2018] HYDROcodone-acetaminophen (NORCO) 7.5-325 MG per tablet Take 1 tablet by mouth every 4 hours as needed for pain maximum 3-4 tablet(s) per day 100 tablet 0     sildenafil (REVATIO) 20 MG tablet Take 3 tablets (60 mg) by mouth daily as needed 30 tablet 11     hydrOXYzine (VISTARIL) 25 MG capsule TAKE 1 CAPSULE BY MOUTH THREE TIMES DAILY AS NEEDED FOR ITCHING. 270 capsule 10     albuterol (PROAIR HFA/PROVENTIL HFA/VENTOLIN HFA) 108 (90 BASE) MCG/ACT Inhaler Inhale 2 puffs into the lungs every 6 hours as needed for shortness of breath / dyspnea or wheezing 1 Inhaler 11     triamcinolone (KENALOG) 0.1 % ointment TWICE DAILY PRN AS NEEDED AND HOLD WHEN IN REMISSION 89 g 3     order for DME NEBULIZER SUPPLY  [email or web address]  SPRINT   REVISABLE NEBULIZER SET    [facility]  Address: [facility] [address], [place] [address]  Phone: [phone] 1 each 3     cyclobenzaprine (FLEXERIL) 5 MG tablet TAKE 1 TABLET BY MOUTH TWICE DAILY AS NEEDED FOR MUSCLE SPASMS. 60 tablet 3     carbidopa-levodopa (SINEMET)  MG per tablet Take 1 tablet by mouth At Bedtime 90 tablet 3     simvastatin (ZOCOR) 10 MG tablet Take 1 tablet (10 mg) by mouth At Bedtime 30 tablet 0     fluticasone (FLONASE) 50 MCG/ACT spray INSTILL 1 TO 2 SPRAYS IN EACH NOSTRIL DAILY. 16 g 11     order for DME Bilateral   DEEP SHOES WITH   WITH LONGITUDINAL ARCH SUPPORT  FOR NODULAR PLANTAR FASCIITIS 1 each 1     Melatonin (MELATONIN TR) 10 MG TBCR Take 1 tablet by mouth every evening 30 tablet 11     lidocaine (ASPERCREME W/LIDOCAINE) 4 % CREA 4% topical cream Apply topically once as needed for mild pain 120 g 11     albuterol (2.5 MG/3ML) 0.083% nebulizer solution NEBULIZE AND INHALE 1 VIAL EVERY 6 HOURS AS NEEDED FOR SHORTNESS OF BREATH/DYSPNEA OR WHEEZING. 360 mL 1     triamcinolone (KENALOG) 0.1 % cream APPLY SPARINGLY TO THE AFFECTED AREA THREE TIMES A DAY AS NEEDED. 30 g 0      tiotropium (SPIRIVA HANDIHALER) 18 MCG inhalation capsule Inhale contents of one capsule daily. 90 capsule 3     ketoconazole (NIZORAL) 2 % cream APPLY TOPICALLY TWICE DAILY. 30 g 2     Skin Protectants, Misc. (EUCERIN) cream Apply topically as needed for dry skin 454 g G     mineral oil-white petrolatum (MINERIN, EUCERIN) CREA Apply topically two times daily 454 g 11     ORDER FOR DME Equipment being ordered: LONGITUDINAL ARCH SUPPORTS   DIAGNOSIS PAINFUL ANKLES AND KNEES WITH OSTEOARTHRTIS ADVANCE  ONE PAIR USE AS DIRECTED 1 Device 1     ORDER FOR DME autocpap 6-10 cm       Cholecalciferol (VITAMIN D) 2000 UNITS CAPS Take 1 capsule by mouth daily.       Magnesium Hydroxide (MAGNESIA PO) Take  by mouth.       Saw Palmetto, Serenoa repens, (SAW PALMETTO FRUIT PO) Take  by mouth.       [DISCONTINUED] simvastatin (ZOCOR) 10 MG tablet Take 1 tablet (10 mg) by mouth At Bedtime (Patient not taking: Reported on 2/6/2018) 90 tablet 3     No Known Allergies  Recent Labs   Lab Test  11/09/17   1400  08/01/16   1418  03/17/15   1333   06/11/13   1427  01/29/13   1211   05/25/12   1646   A1C   --   5.4   --    --   5.8   --    --    --    LDL  93  66   --    --   55  71   --   90.2   HDL   --   44   --    --   47  50   --   53   TRIG   --   163*   --    --   151*  121   --   189*   ALT   --   56   --    --    --   40   --   86.0*   CR   --   0.88  1.00   --   0.90  0.90   --   0.8   GFRESTIMATED   --   89  77   --   88  88   < >   --    GFRESTBLACK   --   >90  >90   --   >90  >90   < >   --    POTASSIUM   --   3.9  4.3   < >  4.1  4.0   --   4.3   TSH   --    --    --    --   2.73   --    --    --     < > = values in this interval not displayed.      BP Readings from Last 3 Encounters:   02/06/18 124/78   11/09/17 118/74   08/07/17 116/64    Wt Readings from Last 3 Encounters:   02/06/18 157 lb 8 oz (71.4 kg)   11/09/17 161 lb (73 kg)   08/07/17 176 lb 8 oz (80.1 kg)                  Labs reviewed in EPIC    Reviewed and  updated as needed this visit by clinical staff       Reviewed and updated as needed this visit by Provider         ROS:  has Degeneration of cervical intervertebral disc; Restless legs syndrome (RLS); Pruritic disorder; Degeneration of lumbar or lumbosacral intervertebral disc; Carpal tunnel syndrome; Obesity; External hemorrhoids; Primary localized osteoarthrosis, lower leg; Prurigo; Illiterate; DALTON (obstructive sleep apnea)- mild (AHI 5.9); Hyperlipidemia with target LDL less than 130; Knee pain; Major depression, recurrent (H); Major depressive disorder, recurrent episode, moderate (H); Adjustment disorder with depressed mood; Neck pain; Major depression in partial remission (H); Mixed anxiety depressive disorder; Advance Care Planning; Anxiety; Chronic pain syndrome; Other chronic pain; Midline low back pain with sciatica; Status post total bilateral knee replacement; Simple chronic bronchitis (H); Psychophysiologic insomnia; and Rash on his problem list.    C: NEGATIVE for fever, chills, change in weight  I: NEGATIVE for worrisome rashes, moles or lesions  E: NEGATIVE for vision changes or irritation  E/M: NEGATIVE for ear, mouth and throat problems  R: NEGATIVE for significant cough or SOB  B: NEGATIVE for masses, tenderness or discharge  CV: NEGATIVE for chest pain, palpitations or peripheral edema  GI: NEGATIVE for nausea, abdominal pain, heartburn, or change in bowel habits  : NEGATIVE for frequency, dysuria, or hematuria   male :erectile dysfunction  M:  SEE HISTORY OF PRESENT ILLNESS   NEURO:  RESTLESS LEG SYNDROME AND LOWER BACK PAIN   E: NEGATIVE for temperature intolerance, skin/hair changes  H: NEGATIVE for bleeding problems  P: NEGATIVE for changes in mood or affect    OBJECTIVE:     /78  Pulse 106  Temp 96.9  F (36.1  C) (Tympanic)  Resp 16  Wt 157 lb 8 oz (71.4 kg)  SpO2 100%  BMI 25.81 kg/m2  Body mass index is 25.81 kg/(m^2).  GENERAL: healthy, alert and no distress  EYES: Eyes  grossly normal to inspection, PERRL and conjunctivae and sclerae normal  HENT: ear canals and TM's normal, nose and mouth without ulcers or lesions  NECK: no adenopathy, no asymmetry, masses, or scars and thyroid normal to palpation  RESP: lungs clear to auscultation - no rales, rhonchi or wheezes  CV: regular rate and rhythm, normal S1 S2, no S3 or S4, no murmur, click or rub, no peripheral edema and peripheral pulses strong  ABDOMEN: soft, nontender, no hepatosplenomegaly, no masses and bowel sounds normal  MS:  BILATERAL TOTAL KNEE ARTHROPLASTY   FULL RANGE OF MOTION SHOULDER   DECREASE RANGE OF MOTION OF NECK AND BACK   OSTEOARTHRITIS BILATERAL ANKLE   SKIN: no suspicious lesions or rashes  NEURO: Normal strength and tone, mentation intact and speech normal  PSYCH: mentation appears normal, affect normal/bright  Diabetic foot exam: normal DP and PT pulses, no trophic changes or ulcerative lesions, normal sensory exam and normal monofilament exam    Diagnostic Test Results:  Results for orders placed or performed in visit on 11/09/17   XR Chest 2 Views    Narrative    CHEST TWO VIEWS  11/9/2017 2:04 PM     HISTORY: 62-year-old with bronchitis.       Impression    IMPRESSION: Since May 9, 2016, heart size is normal. No pleural  effusion, pneumothorax, or abnormal area of consolidation.  Inferolateral left hemithorax is excluded from field-of-view.    JOHANA VILLAFANA MD   Prostate spec antigen screen   Result Value Ref Range    PSA 1.76 0 - 4 ug/L   LDL cholesterol direct   Result Value Ref Range    LDL Cholesterol Direct 93 <100 mg/dL   AST   Result Value Ref Range    AST 25 0 - 45 U/L       ASSESSMENT/PLAN:           ICD-10-CM    1. Mild episode of recurrent major depressive disorder (H) F33.0    2. Degeneration of lumbar or lumbosacral intervertebral disc M51.37 HYDROcodone-acetaminophen (NORCO) 7.5-325 MG per tablet     DISCONTINUED: HYDROcodone-acetaminophen (NORCO) 7.5-325 MG per tablet     DISCONTINUED:  HYDROcodone-acetaminophen (NORCO) 7.5-325 MG per tablet   3. Cervicalgia M54.2 HYDROcodone-acetaminophen (NORCO) 7.5-325 MG per tablet     DISCONTINUED: HYDROcodone-acetaminophen (NORCO) 7.5-325 MG per tablet     DISCONTINUED: HYDROcodone-acetaminophen (NORCO) 7.5-325 MG per tablet   4. Degeneration of cervical intervertebral disc M50.30    5. Hyperlipidemia with target LDL less than 130 E78.5    6. Psychophysiologic insomnia F51.04    7. Sexual dysfunction R37 sildenafil (REVATIO) 20 MG tablet    male        Patient Instructions     (F33.0) Mild episode of recurrent major depressive disorder (H)  (primary encounter diagnosis)    Comment:      Plan:          (M51.37) Degeneration of lumbar or lumbosacral intervertebral disc    Comment:      Plan: HYDROcodone-acetaminophen (NORCO) 7.5-325 MG           per tablet, DISCONTINUED:           HYDROcodone-acetaminophen (NORCO) 7.5-325 MG           per tablet, DISCONTINUED:           HYDROcodone-acetaminophen (NORCO) 7.5-325 MG           per tablet                   (M54.2) Cervicalgia    Comment:      Plan: HYDROcodone-acetaminophen (NORCO) 7.5-325 MG           per tablet, DISCONTINUED:           HYDROcodone-acetaminophen (NORCO) 7.5-325 MG           per tablet, DISCONTINUED:           HYDROcodone-acetaminophen (NORCO) 7.5-325 MG           per tablet                   (M50.30) Degeneration of cervical intervertebral disc    Comment:      Plan:          (E78.5) Hyperlipidemia with target LDL less than 130    Comment:      Plan:          (F51.04) Psychophysiologic insomnia    Comment:      Plan:          (R37) Sexual dysfunction    Comment: male     Plan: sildenafil (REVATIO) 20 MG tablet                             JEANNIE LO MD  [facility]

## 2018-02-06 NOTE — PATIENT INSTRUCTIONS
(F33.0) Mild episode of recurrent major depressive disorder (H)  (primary encounter diagnosis)    Comment:      Plan:          (M51.37) Degeneration of lumbar or lumbosacral intervertebral disc    Comment:      Plan: HYDROcodone-acetaminophen (NORCO) 7.5-325 MG           per tablet, DISCONTINUED:           HYDROcodone-acetaminophen (NORCO) 7.5-325 MG           per tablet, DISCONTINUED:           HYDROcodone-acetaminophen (NORCO) 7.5-325 MG           per tablet                   (M54.2) Cervicalgia    Comment:      Plan: HYDROcodone-acetaminophen (NORCO) 7.5-325 MG           per tablet, DISCONTINUED:           HYDROcodone-acetaminophen (NORCO) 7.5-325 MG           per tablet, DISCONTINUED:           HYDROcodone-acetaminophen (NORCO) 7.5-325 MG           per tablet                   (M50.30) Degeneration of cervical intervertebral disc    Comment:      Plan:          (E78.5) Hyperlipidemia with target LDL less than 130    Comment:      Plan:          (F51.04) Psychophysiologic insomnia    Comment:      Plan:          (R37) Sexual dysfunction    Comment: male     Plan: sildenafil (REVATIO) 20 MG tablet

## 2018-02-07 ASSESSMENT — ANXIETY QUESTIONNAIRES: GAD7 TOTAL SCORE: 0

## 2018-02-07 ASSESSMENT — ASTHMA QUESTIONNAIRES: ACT_TOTALSCORE: 23

## 2018-02-12 ENCOUNTER — TELEPHONE (OUTPATIENT)
Dept: FAMILY MEDICINE | Facility: CLINIC | Age: 63
End: 2018-02-12

## 2018-02-12 DIAGNOSIS — R37 SEXUAL DYSFUNCTION: ICD-10-CM

## 2018-02-12 RX ORDER — SILDENAFIL CITRATE 20 MG/1
60-100 TABLET ORAL DAILY PRN
Qty: 90 TABLET | Refills: 11 | Status: SHIPPED | OUTPATIENT
Start: 2018-02-12 | End: 2019-05-09

## 2018-02-12 NOTE — TELEPHONE ENCOUNTER
Reason for Call:  Other     Detailed comments: could you change the RX for sildenafil (REVATIO) 20 MG tablet to # 90 not 30.  For #30 cost is 58.00, for #90 it's 62.00 a lot cheaper for patient.  Only need 4 rx per year not 12.  [facility] pharmacy  Fax number     Phone Number Patient can be reached at: Home number on file [phone] (home)    Best Time: any    Can we leave a detailed message on this number? YES    Call taken on 2/12/2018 at 2:12 PM by NORAH MALIN

## 2018-02-12 NOTE — TELEPHONE ENCOUNTER
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     Address: [address] [place] [address]     Departments: [facility] [employer], [facility], [facility] [employer], [facility], [facility]     Route: Oral     Order: [id number]         Can we change to 90 day supply for this patient?

## 2018-04-03 ENCOUNTER — TELEPHONE (OUTPATIENT)
Dept: FAMILY MEDICINE | Facility: CLINIC | Age: 63
End: 2018-04-03

## 2018-04-03 NOTE — TELEPHONE ENCOUNTER
Our goal is to have forms completed within 72 hours, however some forms may require a visit or additional information.    What clinic location was the form placed at [facility] or [place].?     Who is the form from? [facility]  Where did the form come from? Faxed to clinic   The form was placed in the inbox of Addison Franks Jr MD      Please fax to [phone]    Additional comments:     Call take on 4/3/2018 at 9:44 AM by Birdie Anderson

## 2018-04-23 ENCOUNTER — MEDICAL CORRESPONDENCE (OUTPATIENT)
Dept: HEALTH INFORMATION MANAGEMENT | Facility: CLINIC | Age: 63
End: 2018-04-23

## 2018-04-23 DIAGNOSIS — J41.0 SIMPLE CHRONIC BRONCHITIS (H): ICD-10-CM

## 2018-04-23 NOTE — TELEPHONE ENCOUNTER
"Requested Prescriptions   Pending Prescriptions Disp Refills     albuterol (2.5 MG/3ML) 0.083% neb solution [Pharmacy Med Name: ALBUTEROL 0.083% INHAL SOLN (2.5 MG/3ML NEBU]  Last Written Prescription Date:  11-7-16  Last Fill Quantity: 360ml,  # refills: 1   Last Office Visit  2/6/2018        with  [facility], [facility] or [facility] prescribing provider:     Future Office Visit:    Next 5 appointments (look out 90 days)     May 07, 2018  1:00 PM CDT   SHORT with Addison [facility]anks MD   [facility]    [address] [address] [phone]                    360 mL 7     Sig: NEBULIZE AND INHALE 1 VIAL EVERY 6 HOURS AS NEEDED FOR SHORTNESS OF BREATH/DYSPNEA OR WHEEZING.    Asthma Maintenance Inhalers - Anticholinergics Passed    4/23/2018  2:43 PM       Passed - Patient is age 12 years or older       Passed - Asthma control assessment score within normal limits in last 6 months    Please review ACT score.          Passed - Recent (6 mo) or future (30 days) visit within the authorizing provider's specialty    Patient had office visit in the last 6 months or has a visit in the next 30 days with authorizing provider or within the authorizing provider's specialty.  See \"Patient Info\" tab in inbasket, or \"Choose Columns\" in Meds & Orders section of the refill encounter.              "

## 2018-04-25 NOTE — TELEPHONE ENCOUNTER
Routing refill request to provider for review/approval because:  Dx=bronchitis---continue with nebs?  Last prescription written Nov 2016

## 2018-04-26 RX ORDER — ALBUTEROL SULFATE 0.83 MG/ML
SOLUTION RESPIRATORY (INHALATION)
Qty: 360 ML | Refills: 7 | Status: SHIPPED | OUTPATIENT
Start: 2018-04-26 | End: 2020-03-10

## 2018-04-29 DIAGNOSIS — M54.50 CHRONIC LEFT-SIDED LOW BACK PAIN WITHOUT SCIATICA: ICD-10-CM

## 2018-04-29 DIAGNOSIS — G89.29 CHRONIC LEFT-SIDED LOW BACK PAIN WITHOUT SCIATICA: ICD-10-CM

## 2018-04-30 NOTE — TELEPHONE ENCOUNTER
Requested Prescriptions   Pending Prescriptions Disp Refills     cyclobenzaprine (FLEXERIL) 5 MG tablet [Pharmacy Med Name: CYCLOBENZAPRINE 5 MG TABLET 5 TAB]  Last Written Prescription Date:  10/19/17  Last Fill Quantity: 60,  # refills: 3   Last office visit: 2/6/2018 with prescribing provider:  [facility]   Future Office Visit:   Next 5 appointments (look out 90 days)     May 07, 2018  1:00 PM CDT   SHORT with Addison Franks MD   [facility]    [address] [place] [id number] [phone]                  60 tablet 3     Sig: TAKE 1 TABLET BY MOUTH TWICE DAILY AS NEEDED FOR MUSCLE SPASMS.    There is no refill protocol information for this order

## 2018-04-30 NOTE — TELEPHONE ENCOUNTER
Requested Prescriptions   Pending Prescriptions Disp Refills     cyclobenzaprine (FLEXERIL) 5 MG tablet [Pharmacy Med Name: CYCLOBENZAPRINE 5 MG TABLET 5 TAB] 60 tablet 3     Sig: TAKE 1 TABLET BY MOUTH TWICE DAILY AS NEEDED FOR MUSCLE SPASMS.    There is no refill protocol information for this order        Routing refill request to provider for review/approval because:  Drug not on the [facility] refill protocol

## 2018-05-01 RX ORDER — CYCLOBENZAPRINE HCL 5 MG
TABLET ORAL
Qty: 60 TABLET | Refills: 3 | Status: SHIPPED | OUTPATIENT
Start: 2018-05-01 | End: 2018-08-06

## 2018-05-07 ENCOUNTER — OFFICE VISIT (OUTPATIENT)
Dept: FAMILY MEDICINE | Facility: CLINIC | Age: 63
End: 2018-05-07
Payer: COMMERCIAL

## 2018-05-07 VITALS
SYSTOLIC BLOOD PRESSURE: 102 MMHG | HEART RATE: 79 BPM | WEIGHT: 171 LBS | BODY MASS INDEX: 28.02 KG/M2 | OXYGEN SATURATION: 99 % | TEMPERATURE: 97.2 F | DIASTOLIC BLOOD PRESSURE: 60 MMHG | RESPIRATION RATE: 16 BRPM

## 2018-05-07 DIAGNOSIS — F17.210 CIGARETTE NICOTINE DEPENDENCE WITHOUT COMPLICATION: ICD-10-CM

## 2018-05-07 DIAGNOSIS — G89.4 CHRONIC PAIN SYNDROME: Primary | ICD-10-CM

## 2018-05-07 DIAGNOSIS — M54.2 CERVICALGIA: ICD-10-CM

## 2018-05-07 DIAGNOSIS — J41.0 SIMPLE CHRONIC BRONCHITIS (H): ICD-10-CM

## 2018-05-07 DIAGNOSIS — M54.40 CHRONIC MIDLINE LOW BACK PAIN WITH SCIATICA, SCIATICA LATERALITY UNSPECIFIED: Chronic | ICD-10-CM

## 2018-05-07 DIAGNOSIS — M25.561 CHRONIC PAIN OF RIGHT KNEE: Chronic | ICD-10-CM

## 2018-05-07 DIAGNOSIS — M51.379 DEGENERATION OF LUMBAR OR LUMBOSACRAL INTERVERTEBRAL DISC: ICD-10-CM

## 2018-05-07 DIAGNOSIS — Z12.11 SCREEN FOR COLON CANCER: ICD-10-CM

## 2018-05-07 DIAGNOSIS — G89.29 CHRONIC PAIN OF RIGHT KNEE: Chronic | ICD-10-CM

## 2018-05-07 DIAGNOSIS — E78.5 HYPERLIPIDEMIA WITH TARGET LDL LESS THAN 130: Chronic | ICD-10-CM

## 2018-05-07 DIAGNOSIS — F33.1 MODERATE EPISODE OF RECURRENT MAJOR DEPRESSIVE DISORDER (H): Chronic | ICD-10-CM

## 2018-05-07 DIAGNOSIS — Z23 NEED FOR PROPHYLACTIC VACCINATION WITH TETANUS-DIPHTHERIA (TD): ICD-10-CM

## 2018-05-07 DIAGNOSIS — G89.29 CHRONIC MIDLINE LOW BACK PAIN WITH SCIATICA, SCIATICA LATERALITY UNSPECIFIED: Chronic | ICD-10-CM

## 2018-05-07 DIAGNOSIS — M50.30 DEGENERATION OF CERVICAL INTERVERTEBRAL DISC: Chronic | ICD-10-CM

## 2018-05-07 PROCEDURE — 99214 OFFICE O/P EST MOD 30 MIN: CPT | Performed by: FAMILY MEDICINE

## 2018-05-07 RX ORDER — HYDROCODONE BITARTRATE AND ACETAMINOPHEN 7.5; 325 MG/1; MG/1
1 TABLET ORAL EVERY 4 HOURS PRN
Qty: 90 TABLET | Refills: 0 | Status: SHIPPED | OUTPATIENT
Start: 2018-05-07 | End: 2018-05-07

## 2018-05-07 RX ORDER — HYDROCODONE BITARTRATE AND ACETAMINOPHEN 7.5; 325 MG/1; MG/1
1 TABLET ORAL EVERY 4 HOURS PRN
Qty: 90 TABLET | Refills: 0 | Status: SHIPPED | OUTPATIENT
Start: 2018-07-07 | End: 2018-08-06

## 2018-05-07 RX ORDER — NICOTINE 21 MG/24HR
1 PATCH, TRANSDERMAL 24 HOURS TRANSDERMAL EVERY 24 HOURS
Qty: 30 PATCH | Refills: 0 | Status: SHIPPED | OUTPATIENT
Start: 2018-05-07 | End: 2018-08-06

## 2018-05-07 RX ORDER — HYDROCODONE BITARTRATE AND ACETAMINOPHEN 7.5; 325 MG/1; MG/1
1 TABLET ORAL EVERY 4 HOURS PRN
Qty: 90 TABLET | Refills: 0 | Status: SHIPPED | OUTPATIENT
Start: 2018-06-07 | End: 2018-05-07

## 2018-05-07 NOTE — LETTER
My Depression Action Plan  Name: Claude D Feinstein   Date of Birth 1955  Date: 5/7/2018    My doctor: Addison Franks   My clinic: [address]  [facility] [address] [phone]          GREEN    ZONE   Good Control    What it looks like:     Things are going generally well. You have normal up s and down s. You may even feel depressed from time to time, but bad moods usually last less than a day.   What you need to do:  1. Continue to care for yourself (see self care plan)  2. Check your depression survival kit and update it as needed  3. Follow your physician s recommendations including any medication.  4. Do not stop taking medication unless you consult with your physician first.           YELLOW         ZONE Getting Worse    What it looks like:     Depression is starting to interfere with your life.     It may be hard to get out of bed; you may be starting to isolate yourself from others.    Symptoms of depression are starting to last most all day and this has happened for several days.     You may have suicidal thoughts but they are not constant.   What you need to do:     1. Call your care team, your response to treatment will improve if you keep your care team informed of your progress. Yellow periods are signs an adjustment may need to be made.     2. Continue your self-care, even if you have to fake it!    3. Talk to someone in your support network    4. Open up your depression survival kit           RED    ZONE Medical Alert - Get Help    What it looks like:     Depression is seriously interfering with your life.     You may experience these or other symptoms: You can t get out of bed most days, can t work or engage in other necessary activities, you have trouble taking care of basic hygiene, or basic responsibilities, thoughts of suicide or death that will not go away, self-injurious behavior.     What you need  to do:  1. Call your care team and request a same-day appointment. If they are not available (weekends or after hours) call your local crisis line, emergency room or 911.            Depression Self Care Plan / Survival Kit    Self-Care for Depression  Here s the deal. Your body and mind are really not as separate as most people think.  What you do and think affects how you feel and how you feel influences what you do and think. This means if you do things that people who feel good do, it will help you feel better.  Sometimes this is all it takes.  There is also a place for medication and therapy depending on how severe your depression is, so be sure to consult with your medical provider and/ or Behavioral Health Consultant if your symptoms are worsening or not improving.     In order to better manage my stress, I will:    Exercise  Get some form of exercise, every day. This will help reduce pain and release endorphins, the  feel good  chemicals in your brain. This is almost as good as taking antidepressants!  This is not the same as joining a gym and then never going! (they count on that by the way ) It can be as simple as just going for a walk or doing some gardening, anything that will get you moving.      Hygiene   Maintain good hygiene (Get out of bed in the morning, Make your bed, Brush your teeth, Take a shower, and Get dressed like you were going to work, even if you are unemployed).  If your clothes don't fit try to get ones that do.    Diet  I will strive to eat foods that are good for me, drink plenty of water, and avoid excessive sugar, caffeine, alcohol, and other mood-altering substances.  Some foods that are helpful in depression are: complex carbohydrates, B vitamins, flaxseed, fish or fish oil, fresh fruits and vegetables.    Psychotherapy  I agree to participate in Individual Therapy (if recommended).    Medication  If prescribed medications, I agree to take them.  Missing doses can result in  serious side effects.  I understand that drinking alcohol, or other illicit drug use, may cause potential side effects.  I will not stop my medication abruptly without first discussing it with my provider.    Staying Connected With Others  I will stay in touch with my friends, family members, and my primary care provider/team.    Use your imagination  Be creative.  We all have a creative side; it doesn t matter if it s oil painting, sand castles, or mud pies! This will also kick up the endorphins.    Witness Beauty  (AKA stop and smell the roses) Take a look outside, even in mid-winter. Notice colors, textures. Watch the squirrels and birds.     Service to others  Be of service to others.  There is always someone else in need.  By helping others we can  get out of ourselves  and remember the really important things.  This also provides opportunities for practicing all the other parts of the program.    Humor  Laugh and be silly!  Adjust your TV habits for less news and crime-drama and more comedy.    Control your stress  Try breathing deep, massage therapy, biofeedback, and meditation. Find time to relax each day.     My support system    Clinic Contact:  Phone number:    Contact 1:  Phone number:    Contact 2:  Phone number:    Worship/:  Phone number:    Therapist:  Phone number:    Local crisis center:    Phone number:    Other community support:  Phone number:

## 2018-05-07 NOTE — MR AVS SNAPSHOT
After Visit Summary   5/7/2018    [facility] D [facility]    MRN: [id number]           Patient Information     Date Of Birth          1955        Visit Information        Provider Department      5/7/2018 1:00 PM Addison Franks MD [facility]        Today's Diagnoses     Chronic pain syndrome    -  1    Screen for colon cancer        Need for prophylactic vaccination with tetanus-diphtheria (TD)        Degeneration of lumbar or lumbosacral intervertebral disc        Cervicalgia        Degeneration of cervical intervertebral disc        Hyperlipidemia with target LDL less than 130        Chronic pain of right knee        Moderate episode of recurrent major depressive disorder (H)        Chronic midline low back pain with sciatica, sciatica laterality unspecified        Simple chronic bronchitis (H)        Cigarette nicotine dependence without complication          Care Instructions    (G89.4) Chronic pain syndrome  (primary encounter diagnosis)  Comment:    Plan: HYDROcodone-acetaminophen (NORCO) 7.5-325 MG         per tablet             (Z12.11) Screen for colon cancer  Comment:    Plan:      (Z23) Need for prophylactic vaccination with tetanus-diphtheria (TD)  Comment:    Plan:      (M51.37) Degeneration of lumbar or lumbosacral intervertebral disc  Comment:    Plan: HYDROcodone-acetaminophen (NORCO) 7.5-325 MG         per tablet, DISCONTINUED:         HYDROcodone-acetaminophen (NORCO) 7.5-325 MG         per tablet, DISCONTINUED:         HYDROcodone-acetaminophen (NORCO) 7.5-325 MG         per tablet             (M54.2) Cervicalgia  Comment:    Plan: HYDROcodone-acetaminophen (NORCO) 7.5-325 MG         per tablet, DISCONTINUED:         HYDROcodone-acetaminophen (NORCO) 7.5-325 MG         per tablet, DISCONTINUED:         HYDROcodone-acetaminophen (NORCO) 7.5-325 MG         per tablet             (M50.30) Degeneration of cervical intervertebral disc  Comment:   "  Plan:      (E78.5) Hyperlipidemia with target LDL less than 130  Comment:    Plan:      (M25.561,  G89.29) Chronic pain of right knee  Comment: ongong intermittent pain  Plan:      (F33.1) Moderate episode of recurrent major depressive disorder (H)  Comment:    Plan:      (M54.40,  G89.29) Chronic midline low back pain with sciatica, sciatica laterality unspecified  Comment:    Plan:      (J41.0) Simple chronic bronchitis (H)  Comment:    Plan:      Smoker 10 cigarettes     Addison Franks Jr., MD               Follow-ups after your visit        Who to contact     If you have questions or need follow up information about today's clinic visit or your schedule please contact [facility] directly at [phone].  Normal or non-critical lab and imaging results will be communicated to you by MyChart, letter or phone within 4 business days after the clinic has received the results. If you do not hear from us within 7 days, please contact the clinic through MyChart or phone. If you have a critical or abnormal lab result, we will notify you by phone as soon as possible.  Submit refill requests through [employer] or call your pharmacy and they will forward the refill request to us. Please allow 3 business days for your refill to be completed.          Additional Information About Your Visit        [facility] Information     [employer]ila lets you send messages to your doctor, view your test results, renew your prescriptions, schedule appointments and more. To sign up, go to [email or web address] . Click on \"Log in\" on the left side of the screen, which will take you to the Welcome page. Then click on \"Sign up Now\" on the right side of the page.     You will be asked to enter the access code listed below, as well as some personal information. Please follow the directions to create your username and password.     Your access code is: [email or web address]  Expires: 8/5/2018  1:31 PM     Your access code " will  in 90 days. If you need help or a new code, please call your [facility] clinic or [phone].        Care EveryWhere ID     This is your Care EveryWhere ID. This could be used by other organizations to access your [facility] medical records  [id number]        Your Vitals Were     Pulse Temperature Respirations Pulse Oximetry BMI (Body Mass Index)       79 97.2  F (36.2  C) (Tympanic) 16 99% 28.02 kg/m2        Blood Pressure from Last 3 Encounters:   18 102/60   18 124/78   17 118/74    Weight from Last 3 Encounters:   18 171 lb (77.6 kg)   18 157 lb 8 oz (71.4 kg)   17 161 lb (73 kg)              We Performed the Following     DEPRESSION ACTION PLAN (DAP)          Today's Medication Changes          These changes are accurate as of 18  1:31 PM.  If you have any questions, ask your nurse or doctor.               Start taking these medicines.        Dose/Directions    HYDROcodone-acetaminophen 7.5-325 MG per tablet   Commonly known as:  NORCO   Used for:  Degeneration of lumbar or lumbosacral intervertebral disc, Cervicalgia, Chronic pain syndrome   Started by:  Addison Franks MD        Dose:  1 tablet   Start taking on:  2018   Take 1 tablet by mouth every 4 hours as needed for pain maximum 3-4 tablet(s) per day   Quantity:  90 tablet   Refills:  0       * nicotine 14 MG/24HR 24 hr patch   Commonly known as:  NICODERM CQ   Used for:  Cigarette nicotine dependence without complication   Started by:  Addison Franks MD        Dose:  1 patch   Place 1 patch onto the skin every 24 hours   Quantity:  30 patch   Refills:  0       * nicotine 7 MG/24HR 24 hr patch   Commonly known as:  NICODERM CQ   Used for:  Cigarette nicotine dependence without complication   Started by:  Addison Franks MD        Dose:  1 patch   Start taking on:  2018   Place 1 patch onto the skin every 24 hours   Quantity:  30 patch   Refills:  11       * Notice:   This list has 2 medication(s) that are the same as other medications prescribed for you. Read the directions carefully, and ask your doctor or other care provider to review them with you.         Where to get your medicines      These medications were sent to [facility] [place], [place] - [address]  [address] [facility] [facility] [address], [place] [address]     Phone:  [phone]     nicotine 14 MG/24HR 24 hr patch    nicotine 7 MG/24HR 24 hr patch         Some of these will need a paper prescription and others can be bought over the counter.  Ask your nurse if you have questions.     Bring a paper prescription for each of these medications     HYDROcodone-acetaminophen 7.5-325 MG per tablet               Information about OPIOIDS     PRESCRIPTION OPIOIDS: WHAT YOU NEED TO KNOW   You have a prescription for an opioid (narcotic) pain medicine. Opioids can cause addiction. If you have a history of chemical dependency of any type, you are at a higher risk of becoming addicted to opioids. Only take this medicine after all other options have been tried. Take it for as short a time and as few doses as possible.     Do not:    Drive. If you drive while taking these medicines, you could be arrested for driving under the influence (DUI).    Operate heavy machinery    Do any other dangerous activities while taking these medicines.     Drink any alcohol while taking these medicines.      Take with any other medicines that contain acetaminophen. Read all labels carefully. Look for the word  acetaminophen  or  Tylenol.  Ask your pharmacist if you have questions or are unsure.    Store your pills in a secure place, locked if possible. We will not replace any lost or stolen medicine. If you don t finish your medicine, please throw away (dispose) as directed by your pharmacist. The [place] Pollution Control Agency has more information about safe disposal:  https://www.pca.[facility].[place].us/living-green/managing-unwanted-medications    All opioids tend to cause constipation. Drink plenty of water and eat foods that have a lot of fiber, such as fruits, vegetables, prune juice, apple juice and high-fiber cereal. Take a laxative (Miralax, milk of magnesia, Colace, Senna) if you don t move your bowels at least every other day.          Primary Care Provider Office Phone # Fax #    Addison Franks [phone] [address]  [facility] [address]        Equal Access to Services     ALEXSANDRA REDMOND : Hadii amanda singleton hadasho Somary, waaxda luqadaha, qaybta kaalmada adegaudencioyada, mary king. So [facility] [phone].    ATENCIÓN: Si habla español, tiene a garvey disposición servicios gratuitos de asistencia lingüística. Llame al [phone].    We comply with applicable federal civil rights laws and [place] laws. We do not discriminate on the basis of race, color, national origin, age, disability, sex, sexual orientation, or gender identity.            Thank you!     Thank you for choosing [facility]  for your care. Our goal is always to provide you with excellent care. Hearing back from our patients is one way we can continue to improve our services. Please take a few minutes to complete the written survey that you may receive in the mail after your visit with us. Thank you!             Your Updated Medication List - Protect others around you: Learn how to safely use, store and throw away your medicines at www.disposemymeds.org.          This list is accurate as of 5/7/18  1:31 PM.  Always use your most recent med list.                   Brand Name Dispense Instructions for use Diagnosis    * albuterol 108 (90 Base) MCG/ACT Inhaler    PROAIR HFA/PROVENTIL HFA/VENTOLIN HFA    1 Inhaler    Inhale 2 puffs into the lungs every 6 hours as needed for shortness of breath / dyspnea or wheezing    Simple  chronic bronchitis (H)       * albuterol (2.5 MG/3ML) 0.083% neb solution     360 mL    NEBULIZE AND INHALE 1 VIAL EVERY 6 HOURS AS NEEDED FOR SHORTNESS OF BREATH/DYSPNEA OR WHEEZING.    Simple chronic bronchitis (H)       carbidopa-levodopa  MG per tablet    SINEMET    90 tablet    Take 1 tablet by mouth At Bedtime    Restless legs syndrome (RLS)       cyclobenzaprine 5 MG tablet    FLEXERIL    60 tablet    TAKE 1 TABLET BY MOUTH TWICE DAILY AS NEEDED FOR MUSCLE SPASMS.    Chronic left-sided low back pain without sciatica       fluticasone 50 MCG/ACT spray    FLONASE    16 g    INSTILL 1 TO 2 SPRAYS IN EACH NOSTRIL DAILY.    Asthma, chronic, mild persistent, uncomplicated, Simple chronic bronchitis (H)       HYDROcodone-acetaminophen 7.5-325 MG per tablet   Start taking on:  7/7/2018    NORCO    90 tablet    Take 1 tablet by mouth every 4 hours as needed for pain maximum 3-4 tablet(s) per day    Degeneration of lumbar or lumbosacral intervertebral disc, Cervicalgia, Chronic pain syndrome       hydrOXYzine 25 MG capsule    VISTARIL    270 capsule    TAKE 1 CAPSULE BY MOUTH THREE TIMES DAILY AS NEEDED FOR ITCHING.    Mixed anxiety depressive disorder       ketoconazole 2 % cream    NIZORAL    30 g    APPLY TOPICALLY TWICE DAILY.    Scrotal rash       lidocaine 4 % Crea cream    ASPERCREME W/LIDOCAINE    120 g    Apply topically once as needed for mild pain    Chronic pain syndrome, Chronic pain of both knees, Degeneration of lumbar or lumbosacral intervertebral disc, Cervicalgia       MAGNESIA PO      Take  by mouth.        Melatonin 10 MG Tbcr    MELATONIN TR    30 tablet    Take 1 tablet by mouth every evening    Primary insomnia       * mineral oil-white petrolatum Crea cream     454 g    Apply topically two times daily    Pruritic disorder       * eucerin cream     454 g    Apply topically as needed for dry skin    Dermatitis, dyshidrotic       * nicotine 14 MG/24HR 24 hr patch    NICODERM CQ    30 patch     Place 1 patch onto the skin every 24 hours    Cigarette nicotine dependence without complication       * nicotine 7 MG/24HR 24 hr patch   Start taking on:  6/7/2018    NICODERM CQ    30 patch    Place 1 patch onto the skin every 24 hours    Cigarette nicotine dependence without complication       * order for DME      autocpap 6-10 cm        * order for DME     1 Device    Equipment being ordered: LONGITUDINAL ARCH SUPPORTS   DIAGNOSIS PAINFUL ANKLES AND KNEES WITH OSTEOARTHRTIS ADVANCE  ONE PAIR USE AS DIRECTED    Pain in joint involving ankle and foot, unspecified laterality       * order for DME     1 each    Bilateral  DEEP SHOES WITH  WITH LONGITUDINAL ARCH SUPPORT FOR NODULAR PLANTAR FASCIITIS    Post-traumatic osteoarthritis of left ankle       * order for DME     1 each    NEBULIZER SUPPLY MANUELA LC  SPRINT  REVISABLE NEBULIZER SET  [facility] Address: [facility] [address], [place] [address] Phone: [phone]    Simple chronic bronchitis (H)       SAW PALMETTO FRUIT PO      Take  by mouth.        sildenafil 20 MG tablet    REVATIO    90 tablet    Take 3-5 tablets ( mg) by mouth daily as needed    Sexual dysfunction       simvastatin 10 MG tablet    ZOCOR    30 tablet    Take 1 tablet (10 mg) by mouth At Bedtime    High cholesterol       tiotropium 18 MCG capsule    SPIRIVA HANDIHALER    90 capsule    Inhale contents of one capsule daily.    Simple chronic bronchitis (H)       * triamcinolone 0.1 % cream    KENALOG    30 g    APPLY SPARINGLY TO THE AFFECTED AREA THREE TIMES A DAY AS NEEDED.    Rash       * triamcinolone 0.1 % ointment    KENALOG    89 g    TWICE DAILY PRN AS NEEDED AND HOLD WHEN IN REMISSION    Neurodermatitis       vitamin D 2000 units Caps      Take 1 capsule by mouth daily.        * Notice:  This list has 12 medication(s) that are the same as other medications prescribed for you. Read the directions carefully, and ask your doctor or other care  provider to review them with you.

## 2018-05-07 NOTE — PROGRESS NOTES
SUBJECTIVE:   Claude D Feinstein is a 62 year old male who presents to clinic today for the following health issues:      Medication Followup of pain meds    Taking Medication as prescribed: yes    Side Effects:  None    Medication Helping Symptoms:  Most days       Back Pain Follow Up      Description:   Location of pain:  Lower back  Character of pain: dull ache and cramping  Pain radiation: Does not radiate and radiates into the left buttocks  Since last visit, pain is:  unchanged  New numbness or weakness in legs, not attributed to pain:  no     Intensity: Currently 6/10, moderate    History:   Pain interferes with job: Not applicable  Therapies tried without relief: Physical Therapy  Therapies tried with relief: meds           Accompanying Signs & Symptoms:  Risk of Fracture:  None  Risk of Cauda Equina:  None  Risk of Infection:  None  Risk of Cancer:  None        Problem list and histories reviewed & adjusted, as indicated.  Additional history: as documented              Topic Date Due     TETANUS IMMUNIZATION (SYSTEM ASSIGNED)  07/12/2017     FIT Q1 YR  11/17/2017     DEPRESSION ACTION PLAN Q1 YR  05/08/2018     PHQ-9 Q6 MONTHS  05/09/2018               .  Current Outpatient Prescriptions   Medication Sig Dispense Refill     albuterol (2.5 MG/3ML) 0.083% neb solution NEBULIZE AND INHALE 1 VIAL EVERY 6 HOURS AS NEEDED FOR SHORTNESS OF BREATH/DYSPNEA OR WHEEZING. 360 mL 7     albuterol (PROAIR HFA/PROVENTIL HFA/VENTOLIN HFA) 108 (90 BASE) MCG/ACT Inhaler Inhale 2 puffs into the lungs every 6 hours as needed for shortness of breath / dyspnea or wheezing 1 Inhaler 11     carbidopa-levodopa (SINEMET)  MG per tablet Take 1 tablet by mouth At Bedtime 90 tablet 3     Cholecalciferol (VITAMIN D) 2000 UNITS CAPS Take 1 capsule by mouth daily.       cyclobenzaprine (FLEXERIL) 5 MG tablet TAKE 1 TABLET BY MOUTH TWICE DAILY AS NEEDED FOR MUSCLE SPASMS. 60 tablet 3     fluticasone (FLONASE) 50 MCG/ACT spray  INSTILL 1 TO 2 SPRAYS IN EACH NOSTRIL DAILY. 16 g 11     [START ON 7/7/2018] HYDROcodone-acetaminophen (NORCO) 7.5-325 MG per tablet Take 1 tablet by mouth every 4 hours as needed for pain maximum 3-4 tablet(s) per day 90 tablet 0     hydrOXYzine (VISTARIL) 25 MG capsule TAKE 1 CAPSULE BY MOUTH THREE TIMES DAILY AS NEEDED FOR ITCHING. 270 capsule 10     ketoconazole (NIZORAL) 2 % cream APPLY TOPICALLY TWICE DAILY. 30 g 2     lidocaine (ASPERCREME W/LIDOCAINE) 4 % CREA 4% topical cream Apply topically once as needed for mild pain 120 g 11     Magnesium Hydroxide (MAGNESIA PO) Take  by mouth.       Melatonin (MELATONIN TR) 10 MG TBCR Take 1 tablet by mouth every evening 30 tablet 11     mineral oil-white petrolatum (MINERIN, EUCERIN) CREA Apply topically two times daily 454 g 11     nicotine (NICODERM CQ) 14 MG/24HR 24 hr patch Place 1 patch onto the skin every 24 hours 30 patch 0     [START ON 6/7/2018] nicotine (NICODERM CQ) 7 MG/24HR 24 hr patch Place 1 patch onto the skin every 24 hours 30 patch 11     ORDER FOR DME autocpap 6-10 cm       ORDER FOR DME Equipment being ordered: LONGITUDINAL ARCH SUPPORTS   DIAGNOSIS PAINFUL ANKLES AND KNEES WITH OSTEOARTHRTIS ADVANCE  ONE PAIR USE AS DIRECTED 1 Device 1     order for DME Bilateral   DEEP SHOES WITH   WITH LONGITUDINAL ARCH SUPPORT  FOR NODULAR PLANTAR FASCIITIS 1 each 1     order for DME NEBULIZER SUPPLY  [employer]  SPRINT   REVISABLE NEBULIZER SET    [facility]  Address: [facility] [address]  [address], [place] [address]  Phone: [phone] 1 each 3     Saw Palmetto, Serenoa repens, (SAW PALMETTO FRUIT PO) Take  by mouth.       sildenafil (REVATIO) 20 MG tablet Take 3-5 tablets ( mg) by mouth daily as needed 90 tablet 11     simvastatin (ZOCOR) 10 MG tablet Take 1 tablet (10 mg) by mouth At Bedtime 30 tablet 0     Skin Protectants, Misc. (EUCERIN) cream Apply topically as needed for dry skin 454 g G     tiotropium (SPIRIVA  HANDIHALER) 18 MCG inhalation capsule Inhale contents of one capsule daily. 90 capsule 3     triamcinolone (KENALOG) 0.1 % cream APPLY SPARINGLY TO THE AFFECTED AREA THREE TIMES A DAY AS NEEDED. 30 g 0     triamcinolone (KENALOG) 0.1 % ointment TWICE DAILY PRN AS NEEDED AND HOLD WHEN IN REMISSION 89 g 3              No Known Allergies      Immunization History   Administered Date(s) Administered     Influenza (H1N1) 2010     Influenza (IIV3) PF 09/15/2010, 2011, 10/16/2012, 2013     Influenza Vaccine IM 3yrs+ 4 Valent IIV4 2014     Pneumococcal 23 valent 2012     Tdap (Adacel,Boostrix) 2007     Zoster vaccine, live 2017               reports that he drinks alcohol.          reports that he uses illicit drugs.        family history includes Alzheimer Disease in his mother.        indicated that his mother is . He indicated that his father is .          has a past surgical history that includes Foot surgery (); knee surgery (); Ankle surgery (2007); orthopedic surgery (); carpal tunnel release rt/lt; and Head and neck surgery ().         reports that he does not engage in sexual activity.    .  Pediatric History   Patient Guardian Status     Not on file.     Other Topics Concern     Parent/Sibling W/ Cabg, Mi Or Angioplasty Before 65f 55m? No     Social History Narrative               reports that he has been smoking Cigarettes.  He has been smoking about 1.00 pack per day. He has never used smokeless tobacco.        Medical, social, surgical, and family histories reviewed.        Labs reviewed in EPIC  Patient Active Problem List   Diagnosis     Degeneration of cervical intervertebral disc     Restless legs syndrome (RLS)     Pruritic disorder     Degeneration of lumbar or lumbosacral intervertebral disc     External hemorrhoids     Primary localized osteoarthrosis, lower leg     Prurigo     Illiterate     DALTON (obstructive sleep apnea)-  mild (AHI 5.9)     Hyperlipidemia with target LDL less than 130     Chronic pain of right knee     Major depression, recurrent (H)     Major depressive disorder, recurrent episode, moderate (H)     Adjustment disorder with depressed mood     Neck pain     Major depression in partial remission (H)     Mixed anxiety depressive disorder     Advance Care Planning     Anxiety     Chronic pain syndrome     Other chronic pain     Midline low back pain with sciatica     Status post total bilateral knee replacement     Simple chronic bronchitis (H)     Psychophysiologic insomnia       Past Surgical History:   Procedure Laterality Date     ANKLE SURGERY  7/16/2007    synovectomy     CARPAL TUNNEL RELEASE RT/LT       FOOT SURGERY  1991    L bunionectomy     HEAD & NECK SURGERY  1958    plate in head     KNEE SURGERY  1991    R Knee Arthroscopy     ORTHOPEDIC SURGERY  1990    arm surgery laceration R forearm         Social History   Substance Use Topics     Smoking status: Current Every Day Smoker     Packs/day: 1.00     Types: Cigarettes     Last attempt to quit: 3/10/2015     Smokeless tobacco: Never Used     Alcohol use Yes       Family History   Problem Relation Age of Onset     Alzheimer Disease Mother              Current Outpatient Prescriptions   Medication Sig Dispense Refill     albuterol (2.5 MG/3ML) 0.083% neb solution NEBULIZE AND INHALE 1 VIAL EVERY 6 HOURS AS NEEDED FOR SHORTNESS OF BREATH/DYSPNEA OR WHEEZING. 360 mL 7     albuterol (PROAIR HFA/PROVENTIL HFA/VENTOLIN HFA) 108 (90 BASE) MCG/ACT Inhaler Inhale 2 puffs into the lungs every 6 hours as needed for shortness of breath / dyspnea or wheezing 1 Inhaler 11     carbidopa-levodopa (SINEMET)  MG per tablet Take 1 tablet by mouth At Bedtime 90 tablet 3     Cholecalciferol (VITAMIN D) 2000 UNITS CAPS Take 1 capsule by mouth daily.       cyclobenzaprine (FLEXERIL) 5 MG tablet TAKE 1 TABLET BY MOUTH TWICE DAILY AS NEEDED FOR MUSCLE SPASMS. 60 tablet 3      fluticasone (FLONASE) 50 MCG/ACT spray INSTILL 1 TO 2 SPRAYS IN EACH NOSTRIL DAILY. 16 g 11     [START ON 7/7/2018] HYDROcodone-acetaminophen (NORCO) 7.5-325 MG per tablet Take 1 tablet by mouth every 4 hours as needed for pain maximum 3-4 tablet(s) per day 90 tablet 0     hydrOXYzine (VISTARIL) 25 MG capsule TAKE 1 CAPSULE BY MOUTH THREE TIMES DAILY AS NEEDED FOR ITCHING. 270 capsule 10     ketoconazole (NIZORAL) 2 % cream APPLY TOPICALLY TWICE DAILY. 30 g 2     lidocaine (ASPERCREME W/LIDOCAINE) 4 % CREA 4% topical cream Apply topically once as needed for mild pain 120 g 11     Magnesium Hydroxide (MAGNESIA PO) Take  by mouth.       Melatonin (MELATONIN TR) 10 MG TBCR Take 1 tablet by mouth every evening 30 tablet 11     mineral oil-white petrolatum (MINERIN, EUCERIN) CREA Apply topically two times daily 454 g 11     nicotine (NICODERM CQ) 14 MG/24HR 24 hr patch Place 1 patch onto the skin every 24 hours 30 patch 0     [START ON 6/7/2018] nicotine (NICODERM CQ) 7 MG/24HR 24 hr patch Place 1 patch onto the skin every 24 hours 30 patch 11     ORDER FOR DME autocpap 6-10 cm       ORDER FOR DME Equipment being ordered: LONGITUDINAL ARCH SUPPORTS   DIAGNOSIS PAINFUL ANKLES AND KNEES WITH OSTEOARTHRTIS ADVANCE  ONE PAIR USE AS DIRECTED 1 Device 1     order for DME Bilateral   DEEP SHOES WITH   WITH LONGITUDINAL ARCH SUPPORT  FOR NODULAR PLANTAR FASCIITIS 1 each 1     order for DME NEBULIZER SUPPLY  [facility]  SPRINT   REVISABLE NEBULIZER SET    [facility]  Address: [address], [place] [address]  Phone: [phone] 1 each 3     Saw Palmetto, Serenoa repens, (SAW PALMETTO FRUIT PO) Take  by mouth.       sildenafil (REVATIO) 20 MG tablet Take 3-5 tablets ( mg) by mouth daily as needed 90 tablet 11     simvastatin (ZOCOR) 10 MG tablet Take 1 tablet (10 mg) by mouth At Bedtime 30 tablet 0     Skin Protectants, Misc. (EUCERIN) cream Apply topically as needed for dry skin  454 g G     tiotropium (SPIRIVA HANDIHALER) 18 MCG inhalation capsule Inhale contents of one capsule daily. 90 capsule 3     triamcinolone (KENALOG) 0.1 % cream APPLY SPARINGLY TO THE AFFECTED AREA THREE TIMES A DAY AS NEEDED. 30 g 0     triamcinolone (KENALOG) 0.1 % ointment TWICE DAILY PRN AS NEEDED AND HOLD WHEN IN REMISSION 89 g 3           Recent Labs   Lab Test  11/09/17   1400  08/01/16   1418  03/17/15   1333   06/11/13   1427  01/29/13   1211   05/25/12   1646   A1C   --   5.4   --    --   5.8   --    --    --    LDL  93  66   --    --   55  71   --   90.2   HDL   --   44   --    --   47  50   --   53   TRIG   --   163*   --    --   151*  121   --   189*   ALT   --   56   --    --    --   40   --   86.0*   CR   --   0.88  1.00   --   0.90  0.90   --   0.8   GFRESTIMATED   --   89  77   --   88  88   < >   --    GFRESTBLACK   --   >90  >90   --   >90  >90   < >   --    POTASSIUM   --   3.9  4.3   < >  4.1  4.0   --   4.3   TSH   --    --    --    --   2.73   --    --    --     < > = values in this interval not displayed.            BP Readings from Last 6 Encounters:   05/07/18 102/60   02/06/18 124/78   11/09/17 118/74   08/07/17 116/64   05/08/17 124/64   02/06/17 124/74           Wt Readings from Last 3 Encounters:   05/07/18 171 lb (77.6 kg)   02/06/18 157 lb 8 oz (71.4 kg)   11/09/17 161 lb (73 kg)                 Positive symptoms or findings indicated by bold designation:         ROS: 10 point ROS neg other than the symptoms noted above in the HPI.except  has Degeneration of cervical intervertebral disc; Restless legs syndrome (RLS); Pruritic disorder; Degeneration of lumbar or lumbosacral intervertebral disc; External hemorrhoids; Primary localized osteoarthrosis, lower leg; Prurigo; Illiterate; DALTON (obstructive sleep apnea)- mild (AHI 5.9); Hyperlipidemia with target LDL less than 130; Chronic pain of right knee; Major depression, recurrent (H); Major depressive disorder, recurrent episode, moderate  (H); Adjustment disorder with depressed mood; Neck pain; Major depression in partial remission (H); Mixed anxiety depressive disorder; Advance Care Planning; Anxiety; Chronic pain syndrome; Other chronic pain; Midline low back pain with sciatica; Status post total bilateral knee replacement; Simple chronic bronchitis (H); and Psychophysiologic insomnia on his problem list.  Review Of Systems    Skin: negative    Eyes: negative    Ears/Nose/Throat: negative    Respiratory: No shortness of breath, dyspnea on exertion, cough, or hemoptysis    MILD PERSISTENT ASTHMA     Cardiovascular: negative    Gastrointestinal: reflux    Genitourinary: negative    Musculoskeletal: arthritis, joint pain, joint stiffness and SEE HISTORY OF PRESENT ILLNESS     LEFT SHOULDER PAIN     Neurologic: negative    Psychiatric: negative    Hematologic/Lymphatic/Immunologic: anemia    Endocrine: negative                PE:  /60 (Cuff Size: Adult Regular)  Pulse 79  Temp 97.2  F (36.2  C) (Tympanic)  Resp 16  Wt 171 lb (77.6 kg)  SpO2 99%  BMI 28.02 kg/m2 Body mass index is 28.02 kg/(m^2).        Constitutional: general appearance, well nourished, well developed, in no acute distress, well developed, appears stated age, normal body habitus,          Eyes:; The patient has normal eyelids sclerae and conjunctivae :          Ears/Nose/Throat: external ear, overall: normal appearance; external nose, overall: benign appearance, normal moujth gums and lips           Neck: thyroid, overall: normal size, normal consistency, nontender,          Respiratory:  palpation of chest, overall: normal excursion,    Clear to percussion and auscultation     NO Tachypnea    NORMAL  Color          Cardiovascular:  Good color with no peripheral edema    Regular sinus rhythm without murmur.  Physiologic heart sounds   Heart is unelarged    .     Chest/Breast: normal shape           Abdominal exam,  Liver and spleen are  unenlarged        Tenderness    Scars               Urogenital; no renal, flank or bladder  tenderness;          Lymphatic: neck nodes,     Other nodes          Musculoskeletal:  Brief ortho exam normal except:     Right knee pain  Lower back pain     OSTEOARTHRITIS ARTHRITIS     inspection of skin, no rash, lesions; and, palpation, no induration, no tenderness.          Neurologic mental status, overall: alert and oriented; gait, no ataxia, no unsteadiness; coordination, no tremors; cranial nerves, overall: normal motor, overall: normal bulk, tone.           Psychiatric: orientation/consciousness, overall: oriented to person, place and time; behavior/psychomotor activity, no tics, normal psychomotor activity; mood and affect, overall: normal mood and affect; appearance, overall: well-groomed, good eye contact; speech, overall: normal quality, no aphasia and normal quality, quantity, intact.        Diagnostic Test Results:  Results for orders placed or performed in visit on 11/09/17   XR Chest 2 Views    Narrative    CHEST TWO VIEWS  11/9/2017 2:04 PM     HISTORY: 62-year-old with bronchitis.       Impression    IMPRESSION: Since May 9, 2016, heart size is normal. No pleural  effusion, pneumothorax, or abnormal area of consolidation.  Inferolateral left hemithorax is excluded from field-of-view.    JOHANA VILLAFANA MD   Prostate spec antigen screen   Result Value Ref Range    PSA 1.76 0 - 4 ug/L   LDL cholesterol direct   Result Value Ref Range    LDL Cholesterol Direct 93 <100 mg/dL   AST   Result Value Ref Range    AST 25 0 - 45 U/L           ICD-10-CM    1. Chronic pain syndrome G89.4 HYDROcodone-acetaminophen (NORCO) 7.5-325 MG per tablet   2. Screen for colon cancer Z12.11    3. Need for prophylactic vaccination with tetanus-diphtheria (TD) Z23    4. Degeneration of lumbar or lumbosacral intervertebral disc M51.37 HYDROcodone-acetaminophen (NORCO) 7.5-325 MG per tablet     DISCONTINUED: HYDROcodone-acetaminophen (NORCO) 7.5-325 MG per tablet      DISCONTINUED: HYDROcodone-acetaminophen (NORCO) 7.5-325 MG per tablet   5. Cervicalgia M54.2 HYDROcodone-acetaminophen (NORCO) 7.5-325 MG per tablet     DISCONTINUED: HYDROcodone-acetaminophen (NORCO) 7.5-325 MG per tablet     DISCONTINUED: HYDROcodone-acetaminophen (NORCO) 7.5-325 MG per tablet   6. Degeneration of cervical intervertebral disc M50.30    7. Hyperlipidemia with target LDL less than 130 E78.5    8. Chronic pain of right knee M25.561     G89.29     ongong intermittent pain   9. Moderate episode of recurrent major depressive disorder (H) F33.1    10. Chronic midline low back pain with sciatica, sciatica laterality unspecified M54.40     G89.29    11. Simple chronic bronchitis (H) J41.0    12. Cigarette nicotine dependence without complication F17.210 nicotine (NICODERM CQ) 14 MG/24HR 24 hr patch     nicotine (NICODERM CQ) 7 MG/24HR 24 hr patch              .    Side effects benefits and risks thoroughly discussed. .he may come in early if unimproved or getting worse          Please drink 2 glasses of water prior to meals and walk 15-30 minutes after meals        I spent 25 MINUTES SPENT  with patient discussing the following issues   The primary encounter diagnosis was Chronic pain syndrome. Diagnoses of Screen for colon cancer, Need for prophylactic vaccination with tetanus-diphtheria (TD), Degeneration of lumbar or lumbosacral intervertebral disc, Cervicalgia, Degeneration of cervical intervertebral disc, Hyperlipidemia with target LDL less than 130, Chronic pain of right knee, Moderate episode of recurrent major depressive disorder (H), Chronic midline low back pain with sciatica, sciatica laterality unspecified, Simple chronic bronchitis (H), and Cigarette nicotine dependence without complication were also pertinent to this visit. over half of which involved counseling and coordination of care.      Patient Instructions   (G89.4) Chronic pain syndrome  (primary encounter diagnosis)  Comment:     Plan: HYDROcodone-acetaminophen (NORCO) 7.5-325 MG         per tablet             (Z12.11) Screen for colon cancer  Comment:    Plan:      (Z23) Need for prophylactic vaccination with tetanus-diphtheria (TD)  Comment:    Plan:      (M51.37) Degeneration of lumbar or lumbosacral intervertebral disc  Comment:    Plan: HYDROcodone-acetaminophen (NORCO) 7.5-325 MG         per tablet, DISCONTINUED:         HYDROcodone-acetaminophen (NORCO) 7.5-325 MG         per tablet, DISCONTINUED:         HYDROcodone-acetaminophen (NORCO) 7.5-325 MG         per tablet             (M54.2) Cervicalgia  Comment:    Plan: HYDROcodone-acetaminophen (NORCO) 7.5-325 MG         per tablet, DISCONTINUED:         HYDROcodone-acetaminophen (NORCO) 7.5-325 MG         per tablet, DISCONTINUED:         HYDROcodone-acetaminophen (NORCO) 7.5-325 MG         per tablet             (M50.30) Degeneration of cervical intervertebral disc  Comment:    Plan:      (E78.5) Hyperlipidemia with target LDL less than 130  Comment:    Plan:      (M25.561,  G89.29) Chronic pain of right knee  Comment: ongong intermittent pain  Plan:      (F33.1) Moderate episode of recurrent major depressive disorder (H)  Comment:    Plan:      (M54.40,  G89.29) Chronic midline low back pain with sciatica, sciatica laterality unspecified  Comment:    Plan:      (J41.0) Simple chronic bronchitis (H)  Comment:    Plan:      Smoker 10 cigarettes     Jeannie Lo Jr., MD                 ALL THE ABOVE PROBLEMS ARE STABLE AND MED CHANGES AS NOTED        Diet: MEDITERRANEAN DIET         Exercise:  NORMAL   Exercises Range of motion, balance, isometric, and strengthening exercises 30 repetitions twice daily of involved joints            .JEANNIE LO MD 5/7/2018 8:50 PM  May 7, 2018

## 2018-05-07 NOTE — PATIENT INSTRUCTIONS
(G89.4) Chronic pain syndrome  (primary encounter diagnosis)  Comment:    Plan: HYDROcodone-acetaminophen (NORCO) 7.5-325 MG         per tablet             (Z12.11) Screen for colon cancer  Comment:    Plan:      (Z23) Need for prophylactic vaccination with tetanus-diphtheria (TD)  Comment:    Plan:      (M51.37) Degeneration of lumbar or lumbosacral intervertebral disc  Comment:    Plan: HYDROcodone-acetaminophen (NORCO) 7.5-325 MG         per tablet, DISCONTINUED:         HYDROcodone-acetaminophen (NORCO) 7.5-325 MG         per tablet, DISCONTINUED:         HYDROcodone-acetaminophen (NORCO) 7.5-325 MG         per tablet             (M54.2) Cervicalgia  Comment:    Plan: HYDROcodone-acetaminophen (NORCO) 7.5-325 MG         per tablet, DISCONTINUED:         HYDROcodone-acetaminophen (NORCO) 7.5-325 MG         per tablet, DISCONTINUED:         HYDROcodone-acetaminophen (NORCO) 7.5-325 MG         per tablet             (M50.30) Degeneration of cervical intervertebral disc  Comment:    Plan:      (E78.5) Hyperlipidemia with target LDL less than 130  Comment:    Plan:      (M25.561,  G89.29) Chronic pain of right knee  Comment: ongong intermittent pain  Plan:      (F33.1) Moderate episode of recurrent major depressive disorder (H)  Comment:    Plan:      (M54.40,  G89.29) Chronic midline low back pain with sciatica, sciatica laterality unspecified  Comment:    Plan:      (J41.0) Simple chronic bronchitis (H)  Comment:    Plan:      Smoker 10 cigarettes     Addison Franks Jr., MD

## 2018-05-08 ASSESSMENT — PATIENT HEALTH QUESTIONNAIRE - PHQ9: SUM OF ALL RESPONSES TO PHQ QUESTIONS 1-9: 0

## 2018-05-09 ENCOUNTER — TELEPHONE (OUTPATIENT)
Dept: FAMILY MEDICINE | Facility: CLINIC | Age: 63
End: 2018-05-09

## 2018-05-09 NOTE — LETTER
[facility]  [address]  [facility] [address]  [phone]                                                                                                           Claude D [address]  [place] [address]    May 11, 2018       Claude,      I STRONGLY RECOMMEND THAT YOU QUIT SMOKING OR USING ORAL TOBACCO    FIRST START SLOWLY CUTTING DOWN ON THE TIMES OF DAY THAT YOU SMOKE     BREATHER WHEN CRAVINGS COME    PUT CIGARETTES IN A JAR TO  REMIND YOU OF YOUR HABIT    TAKE THE MONEY YOU SAVE AND PLAN A VACATION OR GET AWAY    7MG IF  SMOKE 10 CIGARETTES PER DAY     ORAL LOZENGES, OR GUM, OR INHALER ARE ALSO A CONSIDERATION  FOR CRAVINGS    E CIGARETTES HAVE NOT BEEN PROVEN, BUT MIGHT BE A     SMOKER WITH CHRONIC BRONCHITIS AND ASTHMA     UNABLE TO QUIT SMOKING ON HIS OWN    HAS TRIED TO QUIT ON NUMEROUS OCCASIONS     PLEASE ALLOW THIS MEDICATION TO GO THROUGH PRIOR AUTHORIZATION    JEANNIE LO JR., MD     Thank you for choosing [facility].  We appreciate the opportunity to serve you and look forward to supporting your healthcare needs in the future.    If you have any questions or concerns, please call me or my staff at [phone].      Sincerely,    Jeannie Lo Jr MD

## 2018-05-09 NOTE — TELEPHONE ENCOUNTER
Prior Authorization Retail Medication Request    Medication/Dose: Nicotine 7MG/24HR 24 hr patches  ICD code (if different than what is on RX):    Previously Tried and Failed:    Rationale:      Insurance Name:  [employer]  Insurance ID:  [id number]       Pharmacy Information (if different than what is on RX)  Name:    Phone:        PA started on M. Key: [id number]

## 2018-05-10 NOTE — TELEPHONE ENCOUNTER
Central Prior Authorization Team   Phone: [phone]    PA Initiation    Medication: Nicotine 7MG/24HR 24 hr patches  Insurance Company: [employer] - Phone [phone] Fax [phone]  Pharmacy Filling the Rx: [place], [place] - [address] [place] [address] [address]  Filling Pharmacy Phone: [phone]  Filling Pharmacy Fax: [phone]  Start Date: 5/10/2018

## 2018-05-10 NOTE — TELEPHONE ENCOUNTER
If you would like to appeal, please write a letter of necessity and route the this entire encounter to [facility] pool for them to complete the appeal.   If you would like to change the medication or have pt pay out of pocket, please send to the care team so they can call pt to notify them.

## 2018-05-10 NOTE — TELEPHONE ENCOUNTER
PRIOR AUTHORIZATION DENIED    Medication: Nicotine 7MG/24HR 24 hr patches-DENIED    Denial Date: 5/10/2018    Denial Rational: OTC MEDICATIONS ARE EXCLUDED FROM COVERAGE.        Appeal Information:  IF YOU WOULD LIKE TO APPEAL PLEASE SUPPLY PA TEAM WITH A LETTER OF MEDICAL NECESSITY WITH CLINICAL REASON.

## 2018-05-11 NOTE — TELEPHONE ENCOUNTER
SMOKER WITH CHRONIC BRONCHITIS AND ASTHMA     UNABLE TO QUIT SMOKING ON HIS OWN    HAS TRIED TO QUIT ON NUMEROUS OCCASIONS     PLEASE ALLOW THIS MEDICATION TO GO THROUGH PRIOR AUTHORIZATION    JEANNIE LO JR., MD

## 2018-05-21 NOTE — TELEPHONE ENCOUNTER
Medication Appeal Initiation    We have initiated an appeal for the requested medication:  Medication: Nicotine 7MG/24HR 24 hr patches-APPEAL INITIATED  Appeal Start Date:  5/21/2018  Insurance Company: [employer] - Phone [phone] Fax [phone]  Comments:  APPEAL LETTER FAXED.

## 2018-05-22 NOTE — TELEPHONE ENCOUNTER
MEDICATION APPEAL DENIED    Medication: Nicotine 7MG/24HR 24 hr patches-APPEAL DENIED    Denial Date: 5/22/2018    Denial Rational:        Second Level Appeal Information:          Second level appeals will be managed by the clinic staff and provider. Please contact the [employer] Prior Authorization Team if additional information about the denial is needed.

## 2018-05-22 NOTE — TELEPHONE ENCOUNTER
If you would like to appeal, please write a letter of necessity and route the this entire encounter to [facility] pool for them to complete the appeal.   If you would like to change the medication or have pt pay out of pocket, please send to the care team so they can call pt to notify them.

## 2018-05-23 NOTE — TELEPHONE ENCOUNTER
Called pt and left VM letting him know that this medication is excluded from his insurance plan meaning that he will need to Pay out of pocket for this medication.

## 2018-05-23 NOTE — TELEPHONE ENCOUNTER
When a medication is excluded from plan they do not offer alternatives as they do not cover any medications in the class.  Patient will have to pay out of pocket.

## 2018-05-23 NOTE — TELEPHONE ENCOUNTER
NOT NECESSARY AND A WASTE OF TIME    ARE THERE OTHER ALTERNATIVES     VERY WISE TO QUIT SMOKING    JEANNIE LO JR., MD

## 2018-06-15 ENCOUNTER — TELEPHONE (OUTPATIENT)
Dept: FAMILY MEDICINE | Facility: CLINIC | Age: 63
End: 2018-06-15

## 2018-06-15 NOTE — TELEPHONE ENCOUNTER
Our goal is to have forms completed within 72 hours, however some forms may require a visit or additional information.    What clinic location was the form placed at [facility] or [facility]     Who is the form from? [facility] - CP[facility]  Where did the form come from? Faxed to clinic   The form was placed in the inbox of Addison Franks Jr MD      Please fax to [phone]    Additional comments:     Call take on 6/15/2018 at 1:49 PM by Birdie Anderson

## 2018-08-06 ENCOUNTER — OFFICE VISIT (OUTPATIENT)
Dept: FAMILY MEDICINE | Facility: CLINIC | Age: 63
End: 2018-08-06
Payer: COMMERCIAL

## 2018-08-06 VITALS
OXYGEN SATURATION: 99 % | HEART RATE: 113 BPM | RESPIRATION RATE: 16 BRPM | WEIGHT: 169 LBS | TEMPERATURE: 98.3 F | BODY MASS INDEX: 27.7 KG/M2 | DIASTOLIC BLOOD PRESSURE: 78 MMHG | SYSTOLIC BLOOD PRESSURE: 132 MMHG

## 2018-08-06 DIAGNOSIS — M54.2 CERVICALGIA: ICD-10-CM

## 2018-08-06 DIAGNOSIS — M19.072 PRIMARY OSTEOARTHRITIS OF BOTH ANKLES: Primary | ICD-10-CM

## 2018-08-06 DIAGNOSIS — F41.8 MIXED ANXIETY DEPRESSIVE DISORDER: ICD-10-CM

## 2018-08-06 DIAGNOSIS — M19.071 PRIMARY OSTEOARTHRITIS OF BOTH ANKLES: Primary | ICD-10-CM

## 2018-08-06 DIAGNOSIS — J41.0 SIMPLE CHRONIC BRONCHITIS (H): ICD-10-CM

## 2018-08-06 DIAGNOSIS — J45.30 ASTHMA, CHRONIC, MILD PERSISTENT, UNCOMPLICATED: ICD-10-CM

## 2018-08-06 DIAGNOSIS — G89.29 CHRONIC LEFT-SIDED LOW BACK PAIN WITHOUT SCIATICA: ICD-10-CM

## 2018-08-06 DIAGNOSIS — M51.379 DEGENERATION OF LUMBAR OR LUMBOSACRAL INTERVERTEBRAL DISC: ICD-10-CM

## 2018-08-06 DIAGNOSIS — M54.50 CHRONIC LEFT-SIDED LOW BACK PAIN WITHOUT SCIATICA: ICD-10-CM

## 2018-08-06 DIAGNOSIS — G89.4 CHRONIC PAIN SYNDROME: ICD-10-CM

## 2018-08-06 PROCEDURE — 99214 OFFICE O/P EST MOD 30 MIN: CPT | Performed by: FAMILY MEDICINE

## 2018-08-06 RX ORDER — HYDROCODONE BITARTRATE AND ACETAMINOPHEN 7.5; 325 MG/1; MG/1
1 TABLET ORAL EVERY 4 HOURS PRN
Qty: 90 TABLET | Refills: 0 | Status: SHIPPED | OUTPATIENT
Start: 2018-10-06 | End: 2018-11-12

## 2018-08-06 RX ORDER — CYCLOBENZAPRINE HCL 5 MG
TABLET ORAL
Qty: 60 TABLET | Refills: 3 | Status: SHIPPED | OUTPATIENT
Start: 2018-08-06 | End: 2019-08-27

## 2018-08-06 RX ORDER — HYDROCODONE BITARTRATE AND ACETAMINOPHEN 7.5; 325 MG/1; MG/1
1 TABLET ORAL EVERY 4 HOURS PRN
Qty: 90 TABLET | Refills: 0 | Status: SHIPPED | OUTPATIENT
Start: 2018-09-06 | End: 2018-08-06

## 2018-08-06 RX ORDER — HYDROCODONE BITARTRATE AND ACETAMINOPHEN 7.5; 325 MG/1; MG/1
1 TABLET ORAL EVERY 4 HOURS PRN
Qty: 90 TABLET | Refills: 0 | Status: SHIPPED | OUTPATIENT
Start: 2018-08-06 | End: 2018-08-06

## 2018-08-06 RX ORDER — FLUTICASONE PROPIONATE 50 MCG
SPRAY, SUSPENSION (ML) NASAL
Qty: 16 G | Refills: 11 | Status: SHIPPED | OUTPATIENT
Start: 2018-08-06 | End: 2019-11-15

## 2018-08-06 RX ORDER — HYDROXYZINE PAMOATE 25 MG/1
CAPSULE ORAL
Qty: 270 CAPSULE | Refills: 10 | Status: SHIPPED | OUTPATIENT
Start: 2018-08-06 | End: 2018-11-12

## 2018-08-06 NOTE — PROGRESS NOTES
"  SUBJECTIVE:   Claude D Feinstein is a 63 year old male who presents to clinic today for the following health issues:      Chronic Pain Follow-Up       Type / Location of Pain: back pain  Analgesia/pain control:       Recent changes:  Worse on some days      Overall control: Tolerable with discomfort  Activity level/function:      Daily activities:  Able to do moderate activities    Work:  not applicable  Adverse effects:  No  Adherance    Taking medication as directed?  Yes    Participating in other treatments: not applicable  Risk Factors:    Sleep:  Fair    Mood/anxiety:  controlled    Recent family or social stressors:  none noted    Other aggravating factors: none  PHQ-9 SCORE 5/8/2017 11/9/2017 5/7/2018   Total Score - - -   Total Score 0 0 0     RAKESH-7 SCORE 11/3/2015 11/7/2016 2/6/2018   Total Score 0 0 0     Encounter-Level CSA - 11/09/2017:          Controlled Substance Agreement - Scan on 11/21/2017  6:30 AM : CONTROLLED SUBSTANCE AGREEMENT (below)            Encounter-Level CSA - 11/09/2017:          Controlled Substance Agreement - Scan on 9/16/2015 11:59 AM : [facility], Controlled Substance Contract, 09-08-15 (below)                Amount of exercise or physical activity: 2-3 days/week for an average of 30-45 minutes    Problems taking medications regularly: No    Medication side effects: none    Diet: regular (no restrictions)      .JEANNIE LO MD .8/6/2018 7:50 PM .August 6, 2018        Claude D Feinstein is a 63 year old male who is who presents with OSTEOARTHRITIS ANKLES AND     LOWER BACK PAIN  AND NECK PAIN     BILATERAL TOTAL KNEE ARTHROPLASTY     Onset :  MANY YEARS      Severity: MODERATE       Home treatments ANKLE AND FOOT  PAIN      Additional Symptoms:   NOT APPLICABLE      Course ONGOING    CERVICAL DISC DISEASE     RESTLESS LEG SYNDROME     ITICHING IMPROVED     HEMORRHOIDS IMPROVED     OBSTRUCTIVE SLEEP APNEA IMPROVED with WEIGHT     LDL OR \"BAD\" CHOLESTEROL  GOAL < 100     DEPRESSION " AND ANXIETY IMPROVED     CHRONIC BRONCHITIS QUIT SMOKING    INSOMNIA                           Topic Date Due     TETANUS IMMUNIZATION (SYSTEM ASSIGNED)  07/12/2017     FIT Q1 YR  11/17/2017     ASTHMA CONTROL TEST Q6 MOS  08/06/2018     ASTHMA ACTION PLAN Q1 YR  08/07/2018     COPD ACTION PLAN Q1 YR  08/17/2018               .  Current Outpatient Prescriptions   Medication Sig Dispense Refill     albuterol (2.5 MG/3ML) 0.083% neb solution NEBULIZE AND INHALE 1 VIAL EVERY 6 HOURS AS NEEDED FOR SHORTNESS OF BREATH/DYSPNEA OR WHEEZING. 360 mL 7     albuterol (PROAIR HFA/PROVENTIL HFA/VENTOLIN HFA) 108 (90 BASE) MCG/ACT Inhaler Inhale 2 puffs into the lungs every 6 hours as needed for shortness of breath / dyspnea or wheezing 1 Inhaler 11     carbidopa-levodopa (SINEMET)  MG per tablet Take 1 tablet by mouth At Bedtime 90 tablet 3     Cholecalciferol (VITAMIN D) 2000 UNITS CAPS Take 1 capsule by mouth daily.       cyclobenzaprine (FLEXERIL) 5 MG tablet TAKE 1 TABLET BY MOUTH TWICE DAILY AS NEEDED FOR MUSCLE SPASMS. 60 tablet 3     fluticasone (FLONASE) 50 MCG/ACT spray INSTILL 1 TO 2 SPRAYS IN EACH NOSTRIL DAILY. 16 g 11     [START ON 10/6/2018] HYDROcodone-acetaminophen (NORCO) 7.5-325 MG per tablet Take 1 tablet by mouth every 4 hours as needed for pain maximum 3-4 tablet(s) per day 90 tablet 0     hydrOXYzine (VISTARIL) 25 MG capsule TAKE 1 CAPSULE BY MOUTH THREE TIMES DAILY AS NEEDED FOR ITCHING. 270 capsule 10     lidocaine (ASPERCREME W/LIDOCAINE) 4 % CREA 4% topical cream Apply topically once as needed for mild pain 120 g 11     Melatonin (MELATONIN TR) 10 MG TBCR Take 1 tablet by mouth every evening 30 tablet 11     mineral oil-white petrolatum (MINERIN, EUCERIN) CREA Apply topically two times daily 454 g 11     order for DME Equipment being ordered: one pair longitudinal arch supports  Bilateral ankle braces  [facility]   [facility]   Address: [address], [place] [address]   [phone] 3 each 3     order for DME NEBULIZER SUPPLY  MANUELA LC  SPRINT   REVISABLE NEBULIZER SET    [facility]  Address: [address], [place] [address]  Phone: [phone] 1 each 3     order for DME Bilateral   DEEP SHOES WITH   WITH LONGITUDINAL ARCH SUPPORT  FOR NODULAR PLANTAR FASCIITIS 1 each 1     ORDER FOR DME Equipment being ordered: LONGITUDINAL ARCH SUPPORTS   DIAGNOSIS PAINFUL ANKLES AND KNEES WITH OSTEOARTHRTIS ADVANCE  ONE PAIR USE AS DIRECTED 1 Device 1     ORDER FOR DME autocpap 6-10 cm       Saw Palmetto, Serenoa repens, (SAW PALMETTO FRUIT PO) Take  by mouth.       sildenafil (REVATIO) 20 MG tablet Take 3-5 tablets ( mg) by mouth daily as needed 90 tablet 11     simvastatin (ZOCOR) 10 MG tablet Take 1 tablet (10 mg) by mouth At Bedtime 30 tablet 0     Skin Protectants, Misc. (EUCERIN) cream Apply topically as needed for dry skin 454 g G     tiotropium (SPIRIVA HANDIHALER) 18 MCG inhalation capsule Inhale contents of one capsule daily. 90 capsule 3     triamcinolone (KENALOG) 0.1 % cream APPLY SPARINGLY TO THE AFFECTED AREA THREE TIMES A DAY AS NEEDED. 30 g 0     triamcinolone (KENALOG) 0.1 % ointment TWICE DAILY PRN AS NEEDED AND HOLD WHEN IN REMISSION 89 g 3              No Known Allergies      Immunization History   Administered Date(s) Administered     Influenza (H1N1) 2010     Influenza (IIV3) PF 09/15/2010, 2011, 10/16/2012, 2013     Influenza Vaccine IM 3yrs+ 4 Valent IIV4 2014     Pneumococcal 23 valent 2012     Tdap (Adacel,Boostrix) 2007     Zoster vaccine, live 2017               reports that he drinks alcohol.          reports that he uses illicit drugs.        family history includes Alzheimer Disease in his mother.        indicated that his mother is . He indicated that his father is .          has a past surgical history that  includes Foot surgery (1991); knee surgery (1991); Ankle surgery (7/16/2007); orthopedic surgery (1990); carpal tunnel release rt/lt; and Head and neck surgery (1958).         reports that he does not engage in sexual activity.    .  Pediatric History   Patient Guardian Status     Not on file.     Other Topics Concern     Parent/Sibling W/ Cabg, Mi Or Angioplasty Before 65f 55m? No     Social History Narrative               reports that he has been smoking Cigarettes.  He has been smoking about 1.00 pack per day. He has never used smokeless tobacco.        Medical, social, surgical, and family histories reviewed.        Labs reviewed in EPIC  Patient Active Problem List   Diagnosis     Degeneration of cervical intervertebral disc     Restless legs syndrome (RLS)     Pruritic disorder     Degeneration of lumbar or lumbosacral intervertebral disc     External hemorrhoids     Primary localized osteoarthrosis, lower leg     Prurigo     Illiterate     DALTON (obstructive sleep apnea)- mild (AHI 5.9)     Hyperlipidemia with target LDL less than 130     Chronic pain of right knee     Major depression, recurrent (H)     Major depressive disorder, recurrent episode, moderate (H)     Adjustment disorder with depressed mood     Neck pain     Major depression in partial remission (H)     Mixed anxiety depressive disorder     Advance Care Planning     Anxiety     Chronic pain syndrome     Other chronic pain     Midline low back pain with sciatica     Status post total bilateral knee replacement     Simple chronic bronchitis (H)     Psychophysiologic insomnia       Past Surgical History:   Procedure Laterality Date     ANKLE SURGERY  7/16/2007    synovectomy     CARPAL TUNNEL RELEASE RT/LT       FOOT SURGERY  1991    L bunionectomy     HEAD & NECK SURGERY  1958    plate in head     KNEE SURGERY  1991    R Knee Arthroscopy     ORTHOPEDIC SURGERY  1990    arm surgery laceration R forearm         Social History   Substance Use Topics      Smoking status: Current Every Day Smoker     Packs/day: 1.00     Types: Cigarettes     Last attempt to quit: 3/10/2015     Smokeless tobacco: Never Used     Alcohol use Yes       Family History   Problem Relation Age of Onset     Alzheimer Disease Mother              Current Outpatient Prescriptions   Medication Sig Dispense Refill     albuterol (2.5 MG/3ML) 0.083% neb solution NEBULIZE AND INHALE 1 VIAL EVERY 6 HOURS AS NEEDED FOR SHORTNESS OF BREATH/DYSPNEA OR WHEEZING. 360 mL 7     albuterol (PROAIR HFA/PROVENTIL HFA/VENTOLIN HFA) 108 (90 BASE) MCG/ACT Inhaler Inhale 2 puffs into the lungs every 6 hours as needed for shortness of breath / dyspnea or wheezing 1 Inhaler 11     carbidopa-levodopa (SINEMET)  MG per tablet Take 1 tablet by mouth At Bedtime 90 tablet 3     Cholecalciferol (VITAMIN D) 2000 UNITS CAPS Take 1 capsule by mouth daily.       cyclobenzaprine (FLEXERIL) 5 MG tablet TAKE 1 TABLET BY MOUTH TWICE DAILY AS NEEDED FOR MUSCLE SPASMS. 60 tablet 3     fluticasone (FLONASE) 50 MCG/ACT spray INSTILL 1 TO 2 SPRAYS IN EACH NOSTRIL DAILY. 16 g 11     [START ON 10/6/2018] HYDROcodone-acetaminophen (NORCO) 7.5-325 MG per tablet Take 1 tablet by mouth every 4 hours as needed for pain maximum 3-4 tablet(s) per day 90 tablet 0     hydrOXYzine (VISTARIL) 25 MG capsule TAKE 1 CAPSULE BY MOUTH THREE TIMES DAILY AS NEEDED FOR ITCHING. 270 capsule 10     lidocaine (ASPERCREME W/LIDOCAINE) 4 % CREA 4% topical cream Apply topically once as needed for mild pain 120 g 11     Melatonin (MELATONIN TR) 10 MG TBCR Take 1 tablet by mouth every evening 30 tablet 11     mineral oil-white petrolatum (MINERIN, EUCERIN) CREA Apply topically two times daily 454 g 11     order for DME Equipment being ordered: one pair longitudinal arch supports  Bilateral ankle braces  [facility]   [facility]   Address: [address], [place] [address]   [phone] 3 each  3     order for DME NEBULIZER SUPPLY  MANUELA LC  SPRINT   REVISABLE NEBULIZER SET    [facility]  Address: [address], [place] [address]  Phone: [phone] 1 each 3     order for DME Bilateral   DEEP SHOES WITH   WITH LONGITUDINAL ARCH SUPPORT  FOR NODULAR PLANTAR FASCIITIS 1 each 1     ORDER FOR DME Equipment being ordered: LONGITUDINAL ARCH SUPPORTS   DIAGNOSIS PAINFUL ANKLES AND KNEES WITH OSTEOARTHRTIS ADVANCE  ONE PAIR USE AS DIRECTED 1 Device 1     ORDER FOR DME autocpap 6-10 cm       Saw Palmetto, Serenoa repens, (SAW PALMETTO FRUIT PO) Take  by mouth.       sildenafil (REVATIO) 20 MG tablet Take 3-5 tablets ( mg) by mouth daily as needed 90 tablet 11     simvastatin (ZOCOR) 10 MG tablet Take 1 tablet (10 mg) by mouth At Bedtime 30 tablet 0     Skin Protectants, Misc. (EUCERIN) cream Apply topically as needed for dry skin 454 g G     tiotropium (SPIRIVA HANDIHALER) 18 MCG inhalation capsule Inhale contents of one capsule daily. 90 capsule 3     triamcinolone (KENALOG) 0.1 % cream APPLY SPARINGLY TO THE AFFECTED AREA THREE TIMES A DAY AS NEEDED. 30 g 0     triamcinolone (KENALOG) 0.1 % ointment TWICE DAILY PRN AS NEEDED AND HOLD WHEN IN REMISSION 89 g 3           Recent Labs   Lab Test  11/09/17   1400  08/01/16   1418  03/17/15   1333   06/11/13   1427  01/29/13   1211   05/25/12   1646   A1C   --   5.4   --    --   5.8   --    --    --    LDL  93  66   --    --   55  71   --   90.2   HDL   --   44   --    --   47  50   --   53   TRIG   --   163*   --    --   151*  121   --   189*   ALT   --   56   --    --    --   40   --   86.0*   CR   --   0.88  1.00   --   0.90  0.90   --   0.8   GFRESTIMATED   --   89  77   --   88  88   < >   --    GFRESTBLACK   --   >90  >90   --   >90  >90   < >   --    POTASSIUM   --   3.9  4.3   < >  4.1  4.0   --   4.3   TSH   --    --    --    --   2.73   --    --    --     < > = values in this interval not displayed.             BP Readings from Last 6 Encounters:   08/06/18 132/78   05/07/18 102/60   02/06/18 124/78   11/09/17 118/74   08/07/17 116/64   05/08/17 124/64           Wt Readings from Last 3 Encounters:   08/06/18 169 lb (76.7 kg)   05/07/18 171 lb (77.6 kg)   02/06/18 157 lb 8 oz (71.4 kg)                 Positive symptoms or findings indicated by bold designation:         ROS: 10 point ROS neg other than the symptoms noted above in the HPI.except  has Degeneration of cervical intervertebral disc; Restless legs syndrome (RLS); Pruritic disorder; Degeneration of lumbar or lumbosacral intervertebral disc; External hemorrhoids; Primary localized osteoarthrosis, lower leg; Prurigo; Illiterate; DALTON (obstructive sleep apnea)- mild (AHI 5.9); Hyperlipidemia with target LDL less than 130; Chronic pain of right knee; Major depression, recurrent (H); Major depressive disorder, recurrent episode, moderate (H); Adjustment disorder with depressed mood; Neck pain; Major depression in partial remission (H); Mixed anxiety depressive disorder; Advance Care Planning; Anxiety; Chronic pain syndrome; Other chronic pain; Midline low back pain with sciatica; Status post total bilateral knee replacement; Simple chronic bronchitis (H); and Psychophysiologic insomnia on his problem list.  Review Of Systems    Skin: negative    Eyes: negative    Ears/Nose/Throat: negative    Respiratory: No shortness of breath, dyspnea on exertion, cough, or hemoptysis    Cardiovascular: negative    Gastrointestinal: negative    Genitourinary: negative    Musculoskeletal: back pain, neck pain and arthritis    Neurologic: negative    Psychiatric: anxiety and depression    Hematologic/Lymphatic/Immunologic: negative    Endocrine: negative                PE:  /78 (Cuff Size: Adult Regular)  Pulse 113  Temp 98.3  F (36.8  C) (Tympanic)  Resp 16  Wt 169 lb (76.7 kg)  SpO2 99%  BMI 27.7 kg/m2 Body mass index is 27.7 kg/(m^2).        Constitutional: general  appearance, well nourished, well developed, in no acute distress, well developed, appears stated age, normal body habitus,          Eyes:; The patient has normal eyelids sclerae and conjunctivae :           Ears/Nose/Throat: external ear, overall: normal appearance; external nose, overall: benign appearance, normal moujth gums and lips           Neck: thyroid, overall: normal size, normal consistency, nontender,          Respiratory:  palpation of chest, overall: normal excursion,    Clear to percussion and auscultation     NO Tachypnea    NORMAL  Color          Cardiovascular:  Good color with no peripheral edema    Regular sinus rhythm without murmur.  Physiologic heart sounds   Heart is unelarged    .     Chest/Breast: normal shape           Abdominal exam,  Liver and spleen are  unenlarged         Tenderness    Scars              Urogenital; no renal, flank or bladder  tenderness;          Lymphatic: neck nodes,     Other nodes         Musculoskeletal:  Brief ortho exam normal except:   OSTEOARTHRITIS AND KNEE PAIN AND LOWER BACK PAIN         Integument: inspection of skin, no rash, lesions; and, palpation, no induration, no tenderness.          Neurologic mental status, overall: alert and oriented; gait, no ataxia, no unsteadiness; coordination, no tremors; cranial nerves, overall: normal motor, overall: normal bulk, tone.          Psychiatric: orientation/consciousness, overall: oriented to person, place and time; behavior/psychomotor activity, no tics, normal psychomotor activity; mood and affect, overall: normal mood and affect; appearance, overall: well-groomed, good eye contact; speech, overall: normal quality, no aphasia and normal quality, quantity, intact.        Diagnostic Test Results:  Results for orders placed or performed in visit on 11/09/17   XR Chest 2 Views    Narrative    CHEST TWO VIEWS  11/9/2017 2:04 PM     HISTORY: 62-year-old with bronchitis.       Impression    IMPRESSION: Since May 9,  2016, heart size is normal. No pleural  effusion, pneumothorax, or abnormal area of consolidation.  Inferolateral left hemithorax is excluded from field-of-view.    JOHANA VILLAFANA MD   Prostate spec antigen screen   Result Value Ref Range    PSA 1.76 0 - 4 ug/L   LDL cholesterol direct   Result Value Ref Range    LDL Cholesterol Direct 93 <100 mg/dL   AST   Result Value Ref Range    AST 25 0 - 45 U/L           ICD-10-CM    1. Primary osteoarthritis of both ankles M19.071 order for DME    M19.072    2. Degeneration of lumbar or lumbosacral intervertebral disc M51.37 HYDROcodone-acetaminophen (NORCO) 7.5-325 MG per tablet     DISCONTINUED: HYDROcodone-acetaminophen (NORCO) 7.5-325 MG per tablet     DISCONTINUED: HYDROcodone-acetaminophen (NORCO) 7.5-325 MG per tablet   3. Cervicalgia M54.2 HYDROcodone-acetaminophen (NORCO) 7.5-325 MG per tablet     DISCONTINUED: HYDROcodone-acetaminophen (NORCO) 7.5-325 MG per tablet     DISCONTINUED: HYDROcodone-acetaminophen (NORCO) 7.5-325 MG per tablet   4. Chronic pain syndrome G89.4 HYDROcodone-acetaminophen (NORCO) 7.5-325 MG per tablet     order for DME     DISCONTINUED: HYDROcodone-acetaminophen (NORCO) 7.5-325 MG per tablet     DISCONTINUED: HYDROcodone-acetaminophen (NORCO) 7.5-325 MG per tablet   5. Mixed anxiety depressive disorder F41.8 hydrOXYzine (VISTARIL) 25 MG capsule   6. Chronic left-sided low back pain without sciatica M54.5 cyclobenzaprine (FLEXERIL) 5 MG tablet    G89.29    7. Asthma, chronic, mild persistent, uncomplicated J45.30 fluticasone (FLONASE) 50 MCG/ACT spray   8. Simple chronic bronchitis (H) J41.0 fluticasone (FLONASE) 50 MCG/ACT spray              .    Side effects benefits and risks thoroughly discussed. .he may come in early if unimproved or getting worse          Please drink 2 glasses of water prior to meals and walk 15-30 minutes after meals        I spent  25 MINUTES SPENT  with patient discussing the following issues   The primary encounter  diagnosis was Primary osteoarthritis of both ankles. Diagnoses of Degeneration of lumbar or lumbosacral intervertebral disc, Cervicalgia, Chronic pain syndrome, Mixed anxiety depressive disorder, Chronic left-sided low back pain without sciatica, Asthma, chronic, mild persistent, uncomplicated, and Simple chronic bronchitis (H) were also pertinent to this visit. over half of which involved counseling and coordination of care.      Patient Instructions   (M19.071,  M19.072) Primary osteoarthritis of both ankles  (primary encounter diagnosis)  Comment:    Plan: order for DME             (M51.37) Degeneration of lumbar or lumbosacral intervertebral disc  Comment:    Plan: HYDROcodone-acetaminophen (NORCO) 7.5-325 MG         per tablet, DISCONTINUED:         HYDROcodone-acetaminophen (NORCO) 7.5-325 MG         per tablet, DISCONTINUED:         HYDROcodone-acetaminophen (NORCO) 7.5-325 MG         per tablet             (M54.2) Cervicalgia  Comment:    Plan: HYDROcodone-acetaminophen (NORCO) 7.5-325 MG         per tablet, DISCONTINUED:         HYDROcodone-acetaminophen (NORCO) 7.5-325 MG         per tablet, DISCONTINUED:         HYDROcodone-acetaminophen (NORCO) 7.5-325 MG         per tablet             (G89.4) Chronic pain syndrome  Comment:    Plan: HYDROcodone-acetaminophen (NORCO) 7.5-325 MG         per tablet, order for DME, DISCONTINUED:         HYDROcodone-acetaminophen (NORCO) 7.5-325 MG         per tablet, DISCONTINUED:         HYDROcodone-acetaminophen (NORCO) 7.5-325 MG         per tablet             (F41.8) Mixed anxiety depressive disorder  Comment:    Plan: hydrOXYzine (VISTARIL) 25 MG capsule             (M54.5,  G89.29) Chronic left-sided low back pain without sciatica  Comment:    Plan: cyclobenzaprine (FLEXERIL) 5 MG tablet             (J45.30) Asthma, chronic, mild persistent, uncomplicated  Comment:    Plan: fluticasone (FLONASE) 50 MCG/ACT spray             (J41.0) Simple chronic bronchitis  (H)  Comment:    Plan: fluticasone (FLONASE) 50 MCG/ACT spray                Jeannie Lo Jr., MD               ALL THE ABOVE PROBLEMS ARE STABLE AND MED CHANGES AS NOTED        Diet:  MEDITERRANEAN DIET         Exercise:  UPPER EXTREMETIES AND LOWER EXTREMITY AND LOWER BACK PAIN   Exercises Range of motion, balance, isometric, and strengthening exercises 30 repetitions twice daily of involved joints            .JEANNIE LO MD 8/6/2018 7:50 PM  August 6, 2018

## 2018-08-06 NOTE — LETTER
My Asthma Action Plan  Name: Claude D Feinstein   YOB: 1955  Date: 8/6/2018   My doctor: JEANNIE LO MD   My clinic: [facility]        My Control Medicine: { :667858}  My Rescue Medicine: { :555524}  {AAP include Oral Steroid:814204} My Asthma Severity: { :412751}  Avoid your asthma triggers: { :768652}        {Is patient a child or adult?:126743}       GREEN ZONE   Good Control    I feel good    No cough or wheeze    Can work, sleep and play without asthma symptoms       Take your asthma control medicine every day.     1. If exercise triggers your asthma, take your rescue medication    15 minutes before exercise or sports, and    During exercise if you have asthma symptoms  2. Spacer to use with inhaler: If you have a spacer, make sure to use it with your inhaler             YELLOW ZONE Getting Worse  I have ANY of these:    I do not feel good    Cough or wheeze    Chest feels tight    Wake up at night   1. Keep taking your Green Zone medications  2. Start taking your rescue medicine:    every 20 minutes for up to 1 hour. Then every 4 hours for 24-48 hours.  3. If you stay in the Yellow Zone for more than 12-24 hours, contact your doctor.  4. If you do not return to the Green Zone in 12-24 hours or you get worse, start taking your oral steroid medicine if prescribed by your provider.           RED ZONE Medical Alert - Get Help  I have ANY of these:    I feel awful    Medicine is not helping    Breathing getting harder    Trouble walking or talking    Nose opens wide to breathe       1. Take your rescue medicine NOW  2. If your provider has prescribed an oral steroid medicine, start taking it NOW  3. Call your doctor NOW  4. If you are still in the Red Zone after 20 minutes and you have not reached your doctor:    Take your rescue medicine again and    Call 911 or go to the emergency room right away    See your regular doctor within 2 weeks of an Emergency Room  or Urgent Care visit for follow-up treatment.          Annual Reminders:  Meet with Asthma Educator,  Flu Shot in the Fall, consider Pneumonia Vaccination for patients with asthma (aged 19 and older).    Pharmacy: [facility] - . [place], [place] - [address]                      Asthma Triggers  How To Control Things That Make Your Asthma Worse    Triggers are things that make your asthma worse.  Look at the list below to help you find your triggers and what you can do about them.  You can help prevent asthma flare-ups by staying away from your triggers.      Trigger                                                          What you can do   Cigarette Smoke  Tobacco smoke can make asthma worse. Do not allow smoking in your home, car or around you.  Be sure no one smokes at a child s day care or school.  If you smoke, ask your health care provider for ways to help you quit.  Ask family members to quit too.  Ask your health care provider for a referral to Quit Plan to help you quit smoking, or call 8-031-588-PLAN.     Colds, Flu, Bronchitis  These are common triggers of asthma. Wash your hands often.  Don t touch your eyes, nose or mouth.  Get a flu shot every year.     Dust Mites  These are tiny bugs that live in cloth or carpet. They are too small to see. Wash sheets and blankets in hot water every week.   Encase pillows and mattress in dust mite proof covers.  Avoid having carpet if you can. If you have carpet, vacuum weekly.   Use a dust mask and HEPA vacuum.   Pollen and Outdoor Mold  Some people are allergic to trees, grass, or weed pollen, or molds. Try to keep your windows closed.  Limit time out doors when pollen count is high.   Ask you health care provider about taking medicine during allergy season.     Animal Dander  Some people are allergic to skin flakes, urine or saliva from pets with fur or feathers. Keep pets with fur or feathers out of your home.    If you can t keep  the pet outdoors, then keep the pet out of your bedroom.  Keep the bedroom door closed.  Keep pets off cloth furniture and away from stuffed toys.     Mice, Rats, and Cockroaches  Some people are allergic to the waste from these pests.   Cover food and garbage.  Clean up spills and food crumbs.  Store grease in the refrigerator.   Keep food out of the bedroom.   Indoor Mold  This can be a trigger if your home has high moisture. Fix leaking faucets, pipes, or other sources of water.   Clean moldy surfaces.  Dehumidify basement if it is damp and smelly.   Smoke, Strong Odors, and Sprays  These can reduce air quality. Stay away from strong odors and sprays, such as perfume, powder, hair spray, paints, smoke incense, paint, cleaning products, candles and new carpet.   Exercise or Sports  Some people with asthma have this trigger. Be active!  Ask your doctor about taking medicine before sports or exercise to prevent symptoms.    Warm up for 5-10 minutes before and after sports or exercise.     Other Triggers of Asthma  Cold air:  Cover your nose and mouth with a scarf.  Sometimes laughing or crying can be a trigger.  Some medicines and food can trigger asthma.

## 2018-08-06 NOTE — PATIENT INSTRUCTIONS
(M19.071,  M19.072) Primary osteoarthritis of both ankles  (primary encounter diagnosis)  Comment:    Plan: order for DME             (M51.37) Degeneration of lumbar or lumbosacral intervertebral disc  Comment:    Plan: HYDROcodone-acetaminophen (NORCO) 7.5-325 MG         per tablet, DISCONTINUED:         HYDROcodone-acetaminophen (NORCO) 7.5-325 MG         per tablet, DISCONTINUED:         HYDROcodone-acetaminophen (NORCO) 7.5-325 MG         per tablet             (M54.2) Cervicalgia  Comment:    Plan: HYDROcodone-acetaminophen (NORCO) 7.5-325 MG         per tablet, DISCONTINUED:         HYDROcodone-acetaminophen (NORCO) 7.5-325 MG         per tablet, DISCONTINUED:         HYDROcodone-acetaminophen (NORCO) 7.5-325 MG         per tablet             (G89.4) Chronic pain syndrome  Comment:    Plan: HYDROcodone-acetaminophen (NORCO) 7.5-325 MG         per tablet, order for DME, DISCONTINUED:         HYDROcodone-acetaminophen (NORCO) 7.5-325 MG         per tablet, DISCONTINUED:         HYDROcodone-acetaminophen (NORCO) 7.5-325 MG         per tablet             (F41.8) Mixed anxiety depressive disorder  Comment:    Plan: hydrOXYzine (VISTARIL) 25 MG capsule             (M54.5,  G89.29) Chronic left-sided low back pain without sciatica  Comment:    Plan: cyclobenzaprine (FLEXERIL) 5 MG tablet             (J45.30) Asthma, chronic, mild persistent, uncomplicated  Comment:    Plan: fluticasone (FLONASE) 50 MCG/ACT spray             (J41.0) Simple chronic bronchitis (H)  Comment:    Plan: fluticasone (FLONASE) 50 MCG/ACT spray                Addison Franks Jr., MD

## 2018-08-06 NOTE — MR AVS SNAPSHOT
After Visit Summary   8/6/2018    Claude D [facility]    MRN: [id number]           Patient Information     Date Of Birth          1955        Visit Information        Provider Department      8/6/2018 1:00 PM Addison Franks MD [facility]        Today's Diagnoses     Primary osteoarthritis of both ankles    -  1    Degeneration of lumbar or lumbosacral intervertebral disc        Cervicalgia        Chronic pain syndrome        Mixed anxiety depressive disorder        Chronic left-sided low back pain without sciatica        Asthma, chronic, mild persistent, uncomplicated        Simple chronic bronchitis (H)          Care Instructions    (M19.071,  M19.072) Primary osteoarthritis of both ankles  (primary encounter diagnosis)  Comment:    Plan: order for DME             (M51.37) Degeneration of lumbar or lumbosacral intervertebral disc  Comment:    Plan: HYDROcodone-acetaminophen (NORCO) 7.5-325 MG         per tablet, DISCONTINUED:         HYDROcodone-acetaminophen (NORCO) 7.5-325 MG         per tablet, DISCONTINUED:         HYDROcodone-acetaminophen (NORCO) 7.5-325 MG         per tablet             (M54.2) Cervicalgia  Comment:    Plan: HYDROcodone-acetaminophen (NORCO) 7.5-325 MG         per tablet, DISCONTINUED:         HYDROcodone-acetaminophen (NORCO) 7.5-325 MG         per tablet, DISCONTINUED:         HYDROcodone-acetaminophen (NORCO) 7.5-325 MG         per tablet             (G89.4) Chronic pain syndrome  Comment:    Plan: HYDROcodone-acetaminophen (NORCO) 7.5-325 MG         per tablet, order for DME, DISCONTINUED:         HYDROcodone-acetaminophen (NORCO) 7.5-325 MG         per tablet, DISCONTINUED:         HYDROcodone-acetaminophen (NORCO) 7.5-325 MG         per tablet             (F41.8) Mixed anxiety depressive disorder  Comment:    Plan: hydrOXYzine (VISTARIL) 25 MG capsule             (M54.5,  G89.29) Chronic left-sided low back pain without  sciatica  Comment:    Plan: cyclobenzaprine (FLEXERIL) 5 MG tablet             (J45.30) Asthma, chronic, mild persistent, uncomplicated  Comment:    Plan: fluticasone (FLONASE) 50 MCG/ACT spray             (J41.0) Simple chronic bronchitis (H)  Comment:    Plan: fluticasone (FLONASE) 50 MCG/ACT spray                Addison Franks Jr., MD             Follow-ups after your visit        Who to contact     If you have questions or need follow up information about today's clinic visit or your schedule please contact [facility] directly at [phone].  Normal or non-critical lab and imaging results will be communicated to you by MyChart, letter or phone within 4 business days after the clinic has received the results. If you do not hear from us within 7 days, please contact the clinic through MyChart or phone. If you have a critical or abnormal lab result, we will notify you by phone as soon as possible.  Submit refill requests through [employer] or call your pharmacy and they will forward the refill request to us. Please allow 3 business days for your refill to be completed.          Additional Information About Your Visit        Care EveryWhere ID     This is your Care EveryWhere ID. This could be used by other organizations to access your [facility] medical records  [id number]        Your Vitals Were     Pulse Temperature Respirations Pulse Oximetry BMI (Body Mass Index)       113 98.3  F (36.8  C) (Tympanic) 16 99% 27.7 kg/m2        Blood Pressure from Last 3 Encounters:   08/06/18 132/78   05/07/18 102/60   02/06/18 124/78    Weight from Last 3 Encounters:   08/06/18 169 lb (76.7 kg)   05/07/18 171 lb (77.6 kg)   02/06/18 157 lb 8 oz (71.4 kg)              We Performed the Following     Asthma Action Plan (AAP)     PAF COMPLETED          Today's Medication Changes          These changes are accurate as of 8/6/18  1:24 PM.  If you have any questions, ask your nurse or doctor.                Start taking these medicines.        Dose/Directions    HYDROcodone-acetaminophen 7.5-325 MG per tablet   Commonly known as:  NORCO   Used for:  Degeneration of lumbar or lumbosacral intervertebral disc, Cervicalgia, Chronic pain syndrome   Started by:  Addison Franks MD        Dose:  1 tablet   Start taking on:  10/6/2018   Take 1 tablet by mouth every 4 hours as needed for pain maximum 3-4 tablet(s) per day   Quantity:  90 tablet   Refills:  0       order for DME   Used for:  Chronic pain syndrome, Primary osteoarthritis of both ankles   Started by:  Addison Franks MD        Equipment being ordered: one pair longitudinal arch supports Bilateral ankle braces  [facility]  [facility]  Address: [address] [place] [address] [address], [place] [address]  [phone]   Quantity:  3 each   Refills:  3         Stop taking these medicines if you haven't already. Please contact your care team if you have questions.     nicotine 14 MG/24HR 24 hr patch   Commonly known as:  NICODERM CQ   Stopped by:  Addison Franks MD           nicotine 7 MG/24HR 24 hr patch   Commonly known as:  NICODERM CQ   Stopped by:  Addison Franks MD                Where to get your medicines      These medications were sent to [facility] - [address]  [address] [place] [address], [facility] [address]     Phone:  [phone]     cyclobenzaprine 5 MG tablet    fluticasone 50 MCG/ACT spray    hydrOXYzine 25 MG capsule         Some of these will need a paper prescription and others can be bought over the counter.  Ask your nurse if you have questions.     Bring a paper prescription for each of these medications     HYDROcodone-acetaminophen 7.5-325 MG per tablet    order for DME               Information about OPIOIDS     PRESCRIPTION OPIOIDS: WHAT YOU NEED TO KNOW   We gave you an opioid (narcotic) pain  medicine. It is important to manage your pain, but opioids are not always the best choice. You should first try all the other options your care team gave you. Take this medicine for as short a time (and as few doses) as possible.     These medicines have risks:    DO NOT drive when on new or higher doses of pain medicine. These medicines can affect your alertness and reaction times, and you could be arrested for driving under the influence (DUI). If you need to use opioids long-term, talk to your care team about driving.    DO NOT operate heave machinery    DO NOT do any other dangerous activities while taking these medicines.     DO NOT drink any alcohol while taking these medicines.      If the opioid prescribed includes acetaminophen, DO NOT take with any other medicines that contain acetaminophen. Read all labels carefully. Look for the word  acetaminophen  or  Tylenol.  Ask your pharmacist if you have questions or are unsure.    You can get addicted to pain medicines, especially if you have a history of addiction (chemical, alcohol or substance dependence). Talk to your care team about ways to reduce this risk.    Store your pills in a secure place, locked if possible. We will not replace any lost or stolen medicine. If you don t finish your medicine, please throw away (dispose) as directed by your pharmacist. The [place] Pollution Control Agency has more information about safe disposal: https://www.pca.[facility].[place].us/living-green/managing-unwanted-medications.     All opioids tend to cause constipation. Drink plenty of water and eat foods that have a lot of fiber, such as fruits, vegetables, prune juice, apple juice and high-fiber cereal. Take a laxative (Miralax, milk of magnesia, Colace, Senna) if you don t move your bowels at least every other day.          Primary Care Provider Office Phone # Fax #    Addison Franks [phone] [address]  [facility] [address]         Equal Access to Services     [facility] Hospital[facility] : Hadii amanda singleton tinytim Celyali, waaxda luqadaha, qaybta kaalmanayeli rubio, mary king. So [facility] [phone].    ATENCIÓN: Si habla español, tiene a garvey disposición servicios gratuitos de asistencia lingüística. Zandraame al [phone].    We comply with applicable federal civil rights laws and [place] laws. We do not discriminate on the basis of race, color, national origin, age, disability, sex, sexual orientation, or gender identity.            Thank you!     Thank you for choosing [facility]  for your care. Our goal is always to provide you with excellent care. Hearing back from our patients is one way we can continue to improve our services. Please take a few minutes to complete the written survey that you may receive in the mail after your visit with us. Thank you!             Your Updated Medication List - Protect others around you: Learn how to safely use, store and throw away your medicines at www.disposemymeds.org.          This list is accurate as of 8/6/18  1:24 PM.  Always use your most recent med list.                   Brand Name Dispense Instructions for use Diagnosis    * albuterol 108 (90 Base) MCG/ACT Inhaler    PROAIR HFA/PROVENTIL HFA/VENTOLIN HFA    1 Inhaler    Inhale 2 puffs into the lungs every 6 hours as needed for shortness of breath / dyspnea or wheezing    Simple chronic bronchitis (H)       * albuterol (2.5 MG/3ML) 0.083% neb solution     360 mL    NEBULIZE AND INHALE 1 VIAL EVERY 6 HOURS AS NEEDED FOR SHORTNESS OF BREATH/DYSPNEA OR WHEEZING.    Simple chronic bronchitis (H)       carbidopa-levodopa  MG per tablet    SINEMET    90 tablet    Take 1 tablet by mouth At Bedtime    Restless legs syndrome (RLS)       cyclobenzaprine 5 MG tablet    FLEXERIL    60 tablet    TAKE 1 TABLET BY MOUTH TWICE DAILY AS NEEDED FOR MUSCLE SPASMS.    Chronic left-sided low back pain without  sciatica       fluticasone 50 MCG/ACT spray    FLONASE    16 g    INSTILL 1 TO 2 SPRAYS IN EACH NOSTRIL DAILY.    Asthma, chronic, mild persistent, uncomplicated, Simple chronic bronchitis (H)       HYDROcodone-acetaminophen 7.5-325 MG per tablet   Start taking on:  10/6/2018    NORCO    90 tablet    Take 1 tablet by mouth every 4 hours as needed for pain maximum 3-4 tablet(s) per day    Degeneration of lumbar or lumbosacral intervertebral disc, Cervicalgia, Chronic pain syndrome       hydrOXYzine 25 MG capsule    VISTARIL    270 capsule    TAKE 1 CAPSULE BY MOUTH THREE TIMES DAILY AS NEEDED FOR ITCHING.    Mixed anxiety depressive disorder       lidocaine 4 % Crea cream    ASPERCREME W/LIDOCAINE    120 g    Apply topically once as needed for mild pain    Chronic pain syndrome, Chronic pain of both knees, Degeneration of lumbar or lumbosacral intervertebral disc, Cervicalgia       Melatonin 10 MG Tbcr    MELATONIN TR    30 tablet    Take 1 tablet by mouth every evening    Primary insomnia       * mineral oil-white petrolatum Crea cream     454 g    Apply topically two times daily    Pruritic disorder       * eucerin cream     454 g    Apply topically as needed for dry skin    Dermatitis, dyshidrotic       * order for DME      autocpap 6-10 cm        * order for DME     1 Device    Equipment being ordered: LONGITUDINAL ARCH SUPPORTS   DIAGNOSIS PAINFUL ANKLES AND KNEES WITH OSTEOARTHRTIS ADVANCE  ONE PAIR USE AS DIRECTED    Pain in joint involving ankle and foot, unspecified laterality       * order for DME     1 each    Bilateral  DEEP SHOES WITH  WITH LONGITUDINAL ARCH SUPPORT FOR NODULAR PLANTAR FASCIITIS    Post-traumatic osteoarthritis of left ankle       * order for DME     1 each    NEBULIZER SUPPLY MANUELA   SPRINT  REVISABLE NEBULIZER SET  [facility] Address: [address], [place] [address] Phone: [phone]    Simple chronic bronchitis (H)       order for DME      3 each    Equipment being ordered: one pair longitudinal arch supports Bilateral ankle braces  [facility]  [facility]  Address: [address], [place] [id number]  [phone]    Chronic pain syndrome, Primary osteoarthritis of both ankles       SAW PALMETTO FRUIT PO      Take  by mouth.        sildenafil 20 MG tablet    REVATIO    90 tablet    Take 3-5 tablets ( mg) by mouth daily as needed    Sexual dysfunction       simvastatin 10 MG tablet    ZOCOR    30 tablet    Take 1 tablet (10 mg) by mouth At Bedtime    High cholesterol       tiotropium 18 MCG capsule    SPIRIVA HANDIHALER    90 capsule    Inhale contents of one capsule daily.    Simple chronic bronchitis (H)       * triamcinolone 0.1 % cream    KENALOG    30 g    APPLY SPARINGLY TO THE AFFECTED AREA THREE TIMES A DAY AS NEEDED.    Rash       * triamcinolone 0.1 % ointment    KENALOG    89 g    TWICE DAILY PRN AS NEEDED AND HOLD WHEN IN REMISSION    Neurodermatitis       vitamin D 2000 units Caps      Take 1 capsule by mouth daily.        * Notice:  This list has 10 medication(s) that are the same as other medications prescribed for you. Read the directions carefully, and ask your doctor or other care provider to review them with you.

## 2018-08-16 ENCOUNTER — TELEPHONE (OUTPATIENT)
Dept: FAMILY MEDICINE | Facility: CLINIC | Age: 63
End: 2018-08-16

## 2018-08-16 NOTE — TELEPHONE ENCOUNTER
Our goal is to have forms completed within 72 hours, however some forms may require a visit or additional information.    What clinic location was the form placed at [facility] or [place].?     Who is the form from? [facility] - CPAP  Where did the form come from? Faxed to clinic   The form was placed in the inbox of Addison Franks Jr MD      Please fax to [phone]    Additional comments:     Call take on 8/16/2018 at 11:57 AM by Birdie Anderson

## 2018-08-22 ENCOUNTER — TELEPHONE (OUTPATIENT)
Dept: FAMILY MEDICINE | Facility: CLINIC | Age: 63
End: 2018-08-22

## 2018-08-22 NOTE — TELEPHONE ENCOUNTER
Our goal is to have forms completed within 72 hours, however some forms may require a visit or additional information.    What clinic location was the form placed at [facility] or [place].?     Who is the form from? [facility] - Detailed Written Order  Where did the form come from? Faxed to clinic   The form was placed in the inbox of Addison Franks Jr MD      Please fax to [phone]    Additional comments:     Call take on 8/22/2018 at 1:50 PM by Birdie Anderson

## 2018-10-23 ENCOUNTER — TELEPHONE (OUTPATIENT)
Dept: FAMILY MEDICINE | Facility: CLINIC | Age: 63
End: 2018-10-23

## 2018-10-23 NOTE — TELEPHONE ENCOUNTER
Our goal is to have forms completed within 72 hours, however some forms may require a visit or additional information.    What clinic location was the form placed at [facility] or [place].?     Who is the form from? [facility]  Where did the form come from? Faxed to clinic   The form was placed in the inbox of Addison Franks Jr MD      Please fax to [phone]    Additional comments:     Call take on 10/23/2018 at 11:11 AM by Birdie Anderson

## 2018-11-12 ENCOUNTER — OFFICE VISIT (OUTPATIENT)
Dept: FAMILY MEDICINE | Facility: CLINIC | Age: 63
End: 2018-11-12
Payer: COMMERCIAL

## 2018-11-12 VITALS
SYSTOLIC BLOOD PRESSURE: 128 MMHG | HEART RATE: 96 BPM | DIASTOLIC BLOOD PRESSURE: 76 MMHG | WEIGHT: 163 LBS | BODY MASS INDEX: 26.71 KG/M2 | OXYGEN SATURATION: 97 % | RESPIRATION RATE: 16 BRPM | TEMPERATURE: 96.6 F

## 2018-11-12 DIAGNOSIS — G89.4 CHRONIC PAIN SYNDROME: ICD-10-CM

## 2018-11-12 DIAGNOSIS — M54.2 CERVICALGIA: ICD-10-CM

## 2018-11-12 DIAGNOSIS — M75.101 BILATERAL ROTATOR CUFF SYNDROME: ICD-10-CM

## 2018-11-12 DIAGNOSIS — M51.379 DEGENERATION OF LUMBAR OR LUMBOSACRAL INTERVERTEBRAL DISC: ICD-10-CM

## 2018-11-12 DIAGNOSIS — G89.29 CHRONIC PAIN OF RIGHT KNEE: Primary | Chronic | ICD-10-CM

## 2018-11-12 DIAGNOSIS — E78.5 HYPERLIPIDEMIA WITH TARGET LDL LESS THAN 130: Chronic | ICD-10-CM

## 2018-11-12 DIAGNOSIS — M75.102 BILATERAL ROTATOR CUFF SYNDROME: ICD-10-CM

## 2018-11-12 DIAGNOSIS — M25.561 CHRONIC PAIN OF RIGHT KNEE: Primary | Chronic | ICD-10-CM

## 2018-11-12 DIAGNOSIS — M51.369 DDD (DEGENERATIVE DISC DISEASE), LUMBAR: ICD-10-CM

## 2018-11-12 PROCEDURE — 80307 DRUG TEST PRSMV CHEM ANLYZR: CPT | Mod: 90 | Performed by: FAMILY MEDICINE

## 2018-11-12 PROCEDURE — 99214 OFFICE O/P EST MOD 30 MIN: CPT | Performed by: FAMILY MEDICINE

## 2018-11-12 PROCEDURE — 99000 SPECIMEN HANDLING OFFICE-LAB: CPT | Performed by: FAMILY MEDICINE

## 2018-11-12 RX ORDER — HYDROCODONE BITARTRATE AND ACETAMINOPHEN 7.5; 325 MG/1; MG/1
1 TABLET ORAL EVERY 4 HOURS PRN
Qty: 90 TABLET | Refills: 0 | Status: SHIPPED | OUTPATIENT
Start: 2019-01-12 | End: 2019-02-04

## 2018-11-12 RX ORDER — HYDROCODONE BITARTRATE AND ACETAMINOPHEN 7.5; 325 MG/1; MG/1
1 TABLET ORAL EVERY 4 HOURS PRN
Qty: 90 TABLET | Refills: 0 | Status: SHIPPED | OUTPATIENT
Start: 2018-12-12 | End: 2018-11-12

## 2018-11-12 RX ORDER — HYDROCODONE BITARTRATE AND ACETAMINOPHEN 7.5; 325 MG/1; MG/1
1 TABLET ORAL EVERY 4 HOURS PRN
Qty: 90 TABLET | Refills: 0 | Status: SHIPPED | OUTPATIENT
Start: 2018-11-12 | End: 2018-11-12

## 2018-11-12 ASSESSMENT — PATIENT HEALTH QUESTIONNAIRE - PHQ9: SUM OF ALL RESPONSES TO PHQ QUESTIONS 1-9: 0

## 2018-11-12 NOTE — LETTER
ovember 19, 2018      Claude [facility]  [address]  [place] [address]        Dear ,    We are writing to inform you of your test results.    NORMAL DRUG SCREEN   NO MARIJUANA   EXPECTED NARCOTIC     Resulted Orders   Pain Drug Scr UR W Rptd Meds   Result Value Ref Range    Pain Drug SCR UR W RPTD Meds FINAL       Comment:      (Note)  ====================================================================  [facility]XAS[facility] COMP DRUG ANALYSIS,UR  ====================================================================  Test                             Result       Flag       Units        Drug Present   Norhydrocodone                 504                     ng/mg creat    Norhydrocodone is an expected metabolite of hydrocodone.   Acetaminophen                  PRESENT                              ====================================================================  Test                      Result    Flag   Units      Ref Range        Creatinine              27               mg/dL      >=20            ====================================================================  Declared Medications:  Medication list was not provided.  ====================================================================  For clinical consultation, please call [phone].  ====================================================================  Anal  ysis performed by [facility] [place] [address]         If you have any questions or concerns, please call the clinic at the number listed above.       Sincerely,        JEANNIE LO MD

## 2018-11-12 NOTE — MR AVS SNAPSHOT
After Visit Summary   11/12/2018    Claude D Feinstein    MRN: [id number]           Patient Information     Date Of Birth          1955        Visit Information        Provider Department      11/12/2018 1:00 PM Addison Franks MD [facility]        Today's Diagnoses     Chronic pain of right knee    -  1    Hyperlipidemia with target LDL less than 130        Chronic pain syndrome        DDD (degenerative disc disease), lumbar        Bilateral rotator cuff syndrome        Degeneration of lumbar or lumbosacral intervertebral disc        Cervicalgia          Care Instructions      (M25.561,  G89.29) Chronic pain of right knee  (primary encounter diagnosis)    Comment:      Plan: OCCUPATIONAL THERAPY REFERRAL                   (E78.5) Hyperlipidemia with target LDL less than 130    Comment:      Plan:          (G89.4) Chronic pain syndrome    Comment:      Plan: OCCUPATIONAL THERAPY REFERRAL, Pain Drug Scr UR          W Rptd Meds                   (M51.36) DDD (degenerative disc disease), lumbar    Comment:      Plan: OCCUPATIONAL THERAPY REFERRAL                   (M75.101,  M75.102) Bilateral rotator cuff syndrome    Comment:      Plan: OCCUPATIONAL THERAPY REFERRAL                                 Follow-ups after your visit        Additional Services     OCCUPATIONAL THERAPY REFERRAL       [facility]  Website  [facility]    Rehabilitation center in [place], [place]   Address: [address] [place] [address], [place] [address]   Hours: Open   Closes 8PM  Veterans Day (Observed) might affect these hours  Phone: [phone]    [employer] [facility]  [address]  [facility] [address] [phone]  BILLING INQUIRIES [phone]  Please arrange for evaluation for scooter must use [employer]                  Follow-up notes from your care team     Return in  about 3 months (around 2/12/2019) for PAIN VISIT.      Future tests that were ordered for you today     Open Future Orders        Priority Expected Expires Ordered    OCCUPATIONAL THERAPY REFERRAL Routine  11/12/2019 11/12/2018            Who to contact     If you have questions or need follow up information about today's clinic visit or your schedule please contact [facility] directly at [phone].  Normal or non-critical lab and imaging results will be communicated to you by MyChart, letter or phone within 4 business days after the clinic has received the results. If you do not hear from us within 7 days, please contact the clinic through MyChart or phone. If you have a critical or abnormal lab result, we will notify you by phone as soon as possible.  Submit refill requests through [facility] or call your pharmacy and they will forward the refill request to us. Please allow 3 business days for your refill to be completed.          Additional Information About Your Visit        Care EveryWhere ID     This is your Care EveryWhere ID. This could be used by other organizations to access your [place] medical records  [id number]        Your Vitals Were     Pulse Temperature Respirations Pulse Oximetry BMI (Body Mass Index)       96 96.6  F (35.9  C) (Tympanic) 16 97% 26.71 kg/m2        Blood Pressure from Last 3 Encounters:   11/12/18 128/76   08/06/18 132/78   05/07/18 102/60    Weight from Last 3 Encounters:   11/12/18 163 lb (73.9 kg)   08/06/18 169 lb (76.7 kg)   05/07/18 171 lb (77.6 kg)              We Performed the Following     Pain Drug Scr UR W Rptd Meds          Today's Medication Changes          These changes are accurate as of 11/12/18  1:42 PM.  If you have any questions, ask your nurse or doctor.               Start taking these medicines.        Dose/Directions    HYDROcodone-acetaminophen 7.5-325 MG per tablet   Commonly known as:  NORCO   Used for:  Degeneration  of lumbar or lumbosacral intervertebral disc, Cervicalgia, Chronic pain syndrome   Started by:  Addison Franks MD        Dose:  1 tablet   Start taking on:  1/12/2019   Take 1 tablet by mouth every 4 hours as needed for pain maximum 3-4 tablet(s) per day   Quantity:  90 tablet   Refills:  0            Where to get your medicines      Some of these will need a paper prescription and others can be bought over the counter.  Ask your nurse if you have questions.     Bring a paper prescription for each of these medications     HYDROcodone-acetaminophen 7.5-325 MG per tablet               Information about OPIOIDS     PRESCRIPTION OPIOIDS: WHAT YOU NEED TO KNOW   We gave you an opioid (narcotic) pain medicine. It is important to manage your pain, but opioids are not always the best choice. You should first try all the other options your care team gave you. Take this medicine for as short a time (and as few doses) as possible.    Some activities can increase your pain, such as bandage changes or therapy sessions. It may help to take your pain medicine 30 to 60 minutes before these activities. Reduce your stress by getting enough sleep, working on hobbies you enjoy and practicing relaxation or meditation. Talk to your care team about ways to manage your pain beyond prescription opioids.    These medicines have risks:    DO NOT drive when on new or higher doses of pain medicine. These medicines can affect your alertness and reaction times, and you could be arrested for driving under the influence (DUI). If you need to use opioids long-term, talk to your care team about driving.    DO NOT operate heavy machinery    DO NOT do any other dangerous activities while taking these medicines.    DO NOT drink any alcohol while taking these medicines.     If the opioid prescribed includes acetaminophen, DO NOT take with any other medicines that contain acetaminophen. Read all labels carefully. Look for the word   acetaminophen  or  Tylenol.  Ask your pharmacist if you have questions or are unsure.    You can get addicted to pain medicines, especially if you have a history of addiction (chemical, alcohol or substance dependence). Talk to your care team about ways to reduce this risk.    All opioids tend to cause constipation. Drink plenty of water and eat foods that have a lot of fiber, such as fruits, vegetables, prune juice, apple juice and high-fiber cereal. Take a laxative (Miralax, milk of magnesia, Colace, Senna) if you don t move your bowels at least every other day. Other side effects include upset stomach, sleepiness, dizziness, throwing up, tolerance (needing more of the medicine to have the same effect), physical dependence and slowed breathing.    Store your pills in a secure place, locked if possible. We will not replace any lost or stolen medicine. If you don t finish your medicine, please throw away (dispose) as directed by your pharmacist. The [place] Pollution Control Agency has more information about safe disposal: [email or web address]-medications         Primary Care Provider Office Phone # Fax #    Addison Franks [phone] [address] [facility] [address]        Equal Access to Services     ALEXSANDRA REDMOND : Hadii aad ku hadasho Soomaali, waaxda luqadaha, qaybta kaalmada adeegyada, mary king. So [facility] [phone].    ATENCIÓN: Si habla español, tiene a garvey disposición servicios gratuitos de asistencia lingüística. Llame al [phone].    We comply with applicable federal civil rights laws and [place] laws. We do not discriminate on the basis of race, color, national origin, age, disability, sex, sexual orientation, or gender identity.            Thank you!     Thank you for choosing [facility]  for your care. Our goal is always to provide you with excellent  care. Hearing back from our patients is one way we can continue to improve our services. Please take a few minutes to complete the written survey that you may receive in the mail after your visit with us. Thank you!             Your Updated Medication List - Protect others around you: Learn how to safely use, store and throw away your medicines at www.disposemymeds.org.          This list is accurate as of 11/12/18  1:42 PM.  Always use your most recent med list.                   Brand Name Dispense Instructions for use Diagnosis    * albuterol 108 (90 Base) MCG/ACT inhaler    PROAIR HFA/PROVENTIL HFA/VENTOLIN HFA    1 Inhaler    Inhale 2 puffs into the lungs every 6 hours as needed for shortness of breath / dyspnea or wheezing    Simple chronic bronchitis (H)       * albuterol (2.5 MG/3ML) 0.083% neb solution     360 mL    NEBULIZE AND INHALE 1 VIAL EVERY 6 HOURS AS NEEDED FOR SHORTNESS OF BREATH/DYSPNEA OR WHEEZING.    Simple chronic bronchitis (H)       carbidopa-levodopa  MG per tablet    SINEMET    90 tablet    TAKE 1 TABLET BY MOUTH AT BEDTIME    Restless legs syndrome (RLS)       cyclobenzaprine 5 MG tablet    FLEXERIL    60 tablet    TAKE 1 TABLET BY MOUTH TWICE DAILY AS NEEDED FOR MUSCLE SPASMS.    Chronic left-sided low back pain without sciatica       fluticasone 50 MCG/ACT spray    FLONASE    16 g    INSTILL 1 TO 2 SPRAYS IN EACH NOSTRIL DAILY.    Asthma, chronic, mild persistent, uncomplicated, Simple chronic bronchitis (H)       HYDROcodone-acetaminophen 7.5-325 MG per tablet   Start taking on:  1/12/2019    NORCO    90 tablet    Take 1 tablet by mouth every 4 hours as needed for pain maximum 3-4 tablet(s) per day    Degeneration of lumbar or lumbosacral intervertebral disc, Cervicalgia, Chronic pain syndrome       hydrOXYzine 25 MG capsule    VISTARIL    270 capsule    TAKE 1 CAPSULE BY MOUTH THREE TIMES DAILY AS NEEDED FOR ITCHING.    Mixed anxiety depressive disorder       lidocaine 4 % Crea  cream    ASPERCREME W/LIDOCAINE    120 g    Apply topically once as needed for mild pain    Chronic pain syndrome, Chronic pain of both knees, Degeneration of lumbar or lumbosacral intervertebral disc, Cervicalgia       Melatonin 10 MG Tbcr    MELATONIN TR    30 tablet    Take 1 tablet by mouth every evening    Primary insomnia       * mineral oil-white petrolatum Crea cream     454 g    Apply topically two times daily    Pruritic disorder       * eucerin cream     454 g    Apply topically as needed for dry skin    Dermatitis, dyshidrotic       * order for DME      autocpap 6-10 cm        * order for DME     1 Device    Equipment being ordered: LONGITUDINAL ARCH SUPPORTS   DIAGNOSIS PAINFUL ANKLES AND KNEES WITH OSTEOARTHRTIS ADVANCE  ONE PAIR USE AS DIRECTED    Pain in joint involving ankle and foot, unspecified laterality       * order for DME     1 each    Bilateral  DEEP SHOES WITH  WITH LONGITUDINAL ARCH SUPPORT FOR NODULAR PLANTAR FASCIITIS    Post-traumatic osteoarthritis of left ankle       * order for DME     1 each    NEBULIZER SUPPLY MANUELA LC  SPRINT  REVISABLE NEBULIZER SET  [facility] Address: [address] [place] [address]  [address], [place] [address] Phone: [phone]    Simple chronic bronchitis (H)       order for DME     3 each    Equipment being ordered: one pair longitudinal arch supports Bilateral ankle braces  [facility]up [facility]  [facility]  Address: [address] [address], [place] [address]  [phone]    Chronic pain syndrome, Primary osteoarthritis of both ankles       SAW PALMETTO FRUIT PO      Take  by mouth.        sildenafil 20 MG tablet    REVATIO    90 tablet    Take 3-5 tablets ( mg) by mouth daily as needed    Sexual dysfunction       simvastatin 10 MG tablet    ZOCOR    30 tablet    Take 1 tablet (10 mg) by mouth At Bedtime    High cholesterol       tiotropium 18 MCG capsule    SPIRIVA HANDIHALER    90  capsule    Inhale contents of one capsule daily.    Simple chronic bronchitis (H)       * triamcinolone 0.1 % cream    KENALOG    30 g    APPLY SPARINGLY TO THE AFFECTED AREA THREE TIMES A DAY AS NEEDED.    Rash       * triamcinolone 0.1 % ointment    KENALOG    89 g    TWICE DAILY PRN AS NEEDED AND HOLD WHEN IN REMISSION    Neurodermatitis       vitamin D 2000 units Caps      Take 1 capsule by mouth daily.        * Notice:  This list has 10 medication(s) that are the same as other medications prescribed for you. Read the directions carefully, and ask your doctor or other care provider to review them with you.

## 2018-11-12 NOTE — PROGRESS NOTES
SUBJECTIVE:   Claude D Feinstein is a 63 year old male who presents to clinic today for the following health issues:      Concern - refill pain meds  Onset: ongoing issue    Description:   Back, neck, left hip, legs    Intensity: moderate    Progression of Symptoms:  same    Accompanying Signs & Symptoms:  Hard to walk any distance for any length of time    Previous history of similar problem:   yes    Precipitating factors:   Worsened by: walking    Alleviating factors:  Improved by: pain meds help     Therapies Tried and outcome: meds    Chronic Pain Follow-Up       Type / Location of Pain:  NECK AND LOWER BACK PAIN   OSTEOARTHRITIS TOTAL KNEE ARTHROPLASTY BILATERAL   OSTEOARTHRITIS BILATERAL ANKLE PAIN   Analgesia/pain control:       Recent changes:  same      Overall control: Tolerable with discomfort  Activity level/function:      Daily activities:  Able to do light housework, cooking    Work:  Unable to work  Adverse effects:  No  Adherance    Taking medication as directed?  Yes    Participating in other treatments: yes  Risk Factors:    Sleep:  Fair    Mood/anxiety:  controlled    Recent family or social stressors:  none noted    Other aggravating factors: none  PHQ-9 SCORE 11/9/2017 5/7/2018 11/12/2018   Total Score - - -   Total Score 0 0 0     RAKESH-7 SCORE 11/3/2015 11/7/2016 2/6/2018   Total Score 0 0 0     Encounter-Level CSA - 11/09/2017:          Controlled Substance Agreement - Scan on 11/21/2017  6:30 AM : CONTROLLED SUBSTANCE AGREEMENT (below)            Encounter-Level CSA - 11/09/2017:          Controlled Substance Agreement - Scan on 9/16/2015 11:59 AM : [employer], Controlled Substance Contract, 09-08-15 (below)                Problem list and histories reviewed & adjusted, as indicated.  Additional history: as documented    Labs reviewed in EPIC    Reviewed and updated as needed this visit by clinical staff       Reviewed and updated as needed this visit by Provider         ROS: has Degeneration of  cervical intervertebral disc; Restless legs syndrome (RLS); Pruritic disorder; Degeneration of lumbar or lumbosacral intervertebral disc; External hemorrhoids; Primary localized osteoarthrosis, lower leg; Prurigo; Illiterate; DALTON (obstructive sleep apnea)- mild (AHI 5.9); Hyperlipidemia with target LDL less than 130; Chronic pain of right knee; Major depression, recurrent (H); Major depressive disorder, recurrent episode, moderate (H); Adjustment disorder with depressed mood; Neck pain; Major depression in partial remission (H); Mixed anxiety depressive disorder; Advance Care Planning; Anxiety; Chronic pain syndrome; Other chronic pain; Midline low back pain with sciatica; Status post total bilateral knee replacement; Simple chronic bronchitis (H); and Psychophysiologic insomnia on his problem list.    CONSTITUTIONAL: NEGATIVE for fever, chills, change in weight  INTEGUMENTARY/SKIN: NEGATIVE for worrisome rashes, moles or lesions  EYES: NEGATIVE for vision changes or irritation  ENT/MOUTH: NEGATIVE for ear, mouth and throat problems  RESP: NEGATIVE for significant cough or SOB  BREAST: NEGATIVE for masses, tenderness or discharge  CV: NEGATIVE for chest pain, palpitations or peripheral edema  GI: NEGATIVE for nausea, abdominal pain, heartburn, or change in bowel habits  : NEGATIVE for frequency, dysuria, or hematuria  MUSCULOSKELETAL SEE HISTORY OF PRESENT ILLNESS   NEURO: NEGATIVE for weakness, dizziness or paresthesias  ENDOCRINE: NEGATIVE for temperature intolerance, skin/hair changes  HEME: NEGATIVE for bleeding problems  PSYCHIATRIC: NEGATIVE for changes in mood or affect DEPRESSION FAIR     OBJECTIVE:     /76  Pulse 96  Temp 96.6  F (35.9  C) (Tympanic)  Resp 16  Wt 163 lb (73.9 kg)  SpO2 97%  BMI 26.71 kg/m2  Body mass index is 26.71 kg/(m^2).  GENERAL: healthy, alert and no distress  NECK: no adenopathy, no asymmetry, masses, or scars and thyroid normal to palpation  RESP: lungs clear to  auscultation - no rales, rhonchi or wheezes  CV: regular rate and rhythm, normal S1 S2, no S3 or S4, no murmur, click or rub, no peripheral edema and peripheral pulses strong  ABDOMEN: soft, nontender, no hepatosplenomegaly, no masses and bowel sounds normal  MS: no gross musculoskeletal defects noted, no edema  SKIN: no suspicious lesions or rashes  NEURO: Normal strength and tone, mentation intact and speech normal    Diagnostic Test Results:  Results for orders placed or performed in visit on 11/09/17   XR Chest 2 Views    Narrative    CHEST TWO VIEWS  11/9/2017 2:04 PM     HISTORY: 62-year-old with bronchitis.       Impression    IMPRESSION: Since May 9, 2016, heart size is normal. No pleural  effusion, pneumothorax, or abnormal area of consolidation.  Inferolateral left hemithorax is excluded from field-of-view.    JOHANA VILLAFANA MD   Prostate spec antigen screen   Result Value Ref Range    PSA 1.76 0 - 4 ug/L   LDL cholesterol direct   Result Value Ref Range    LDL Cholesterol Direct 93 <100 mg/dL   AST   Result Value Ref Range    AST 25 0 - 45 U/L       ASSESSMENT/PLAN:           ICD-10-CM    1. Chronic pain of right knee M25.561 OCCUPATIONAL THERAPY REFERRAL    G89.29    2. Hyperlipidemia with target LDL less than 130 E78.5    3. Chronic pain syndrome G89.4 OCCUPATIONAL THERAPY REFERRAL     Pain Drug Scr UR W Rptd Meds     HYDROcodone-acetaminophen (NORCO) 7.5-325 MG per tablet     DISCONTINUED: HYDROcodone-acetaminophen (NORCO) 7.5-325 MG per tablet     DISCONTINUED: HYDROcodone-acetaminophen (NORCO) 7.5-325 MG per tablet   4. DDD (degenerative disc disease), lumbar M51.36 OCCUPATIONAL THERAPY REFERRAL   5. Bilateral rotator cuff syndrome M75.101 OCCUPATIONAL THERAPY REFERRAL    M75.102    6. Degeneration of lumbar or lumbosacral intervertebral disc M51.37 HYDROcodone-acetaminophen (NORCO) 7.5-325 MG per tablet     DISCONTINUED: HYDROcodone-acetaminophen (NORCO) 7.5-325 MG per tablet     DISCONTINUED:  HYDROcodone-acetaminophen (NORCO) 7.5-325 MG per tablet   7. Cervicalgia M54.2 HYDROcodone-acetaminophen (NORCO) 7.5-325 MG per tablet     DISCONTINUED: HYDROcodone-acetaminophen (NORCO) 7.5-325 MG per tablet     DISCONTINUED: HYDROcodone-acetaminophen (NORCO) 7.5-325 MG per tablet       Patient Instructions     (M25.561,  G89.29) Chronic pain of right knee  (primary encounter diagnosis)    Comment:      Plan: OCCUPATIONAL THERAPY REFERRAL                   (E78.5) Hyperlipidemia with target LDL less than 130    Comment:      Plan:          (G89.4) Chronic pain syndrome    Comment:      Plan: OCCUPATIONAL THERAPY REFERRAL, Pain Drug Scr UR          W Rptd Meds                   (M51.36) DDD (degenerative disc disease), lumbar    Comment:      Plan: OCCUPATIONAL THERAPY REFERRAL                   (M75.101,  M75.102) Bilateral rotator cuff syndrome    Comment:      Plan: OCCUPATIONAL THERAPY REFERRAL                             JEANNIE LO MD  [facility] [place]

## 2018-11-12 NOTE — PATIENT INSTRUCTIONS
(M25.561,  G89.29) Chronic pain of right knee  (primary encounter diagnosis)    Comment:      Plan: OCCUPATIONAL THERAPY REFERRAL                   (E78.5) Hyperlipidemia with target LDL less than 130    Comment:      Plan:          (G89.4) Chronic pain syndrome    Comment:      Plan: OCCUPATIONAL THERAPY REFERRAL, Pain Drug Scr UR          W Rptd Meds                   (M51.36) DDD (degenerative disc disease), lumbar    Comment:      Plan: OCCUPATIONAL THERAPY REFERRAL                   (M75.101,  M75.102) Bilateral rotator cuff syndrome    Comment:      Plan: OCCUPATIONAL THERAPY REFERRAL

## 2018-11-16 LAB — PAIN DRUG SCR UR W RPTD MEDS: NORMAL

## 2018-12-05 DIAGNOSIS — E78.5 HYPERLIPIDEMIA WITH TARGET LDL LESS THAN 130: Chronic | ICD-10-CM

## 2018-12-06 RX ORDER — SIMVASTATIN 10 MG
TABLET ORAL
Qty: 90 TABLET | Refills: 1 | Status: SHIPPED | OUTPATIENT
Start: 2018-12-06 | End: 2019-02-04

## 2018-12-06 NOTE — TELEPHONE ENCOUNTER
"Requested Prescriptions   Pending Prescriptions Disp Refills     simvastatin (ZOCOR) 10 MG tablet [Pharmacy Med Name: SIMVASTATIN 10 MG TABLET 10 TAB]  Last Written Prescription Date:  8/30/2017  Last Fill Quantity: 30 tablet,  # refills: 0   Last office visit: 11/12/2018 with prescribing provider:  MANDIE Franks   Future Office Visit:     90 tablet 3     Sig: TAKE 1 TABLET (10 MG) BY MOUTH AT BEDTIME    Statins Protocol Failed    12/5/2018  6:43 PM       Failed - LDL on file in past 12 months    Recent Labs   Lab Test  11/09/17   1400   LDL  93            Passed - No abnormal creatine kinase in past 12 months    No lab results found.            Passed - Recent (12 mo) or future (30 days) visit within the authorizing provider's specialty    Patient had office visit in the last 12 months or has a visit in the next 30 days with authorizing provider or within the authorizing provider's specialty.  See \"Patient Info\" tab in inbasket, or \"Choose Columns\" in Meds & Orders section of the refill encounter.             Passed - Patient is age 18 or older           "

## 2019-01-23 DIAGNOSIS — J41.0 SIMPLE CHRONIC BRONCHITIS (H): ICD-10-CM

## 2019-01-23 NOTE — TELEPHONE ENCOUNTER
"Requested Prescriptions   Pending Prescriptions Disp Refills     VENTOLIN  (90 Base) MCG/ACT inhaler [Pharmacy Med Name: VENTOLIN HFA 90 MCG INHALER 108 (90 BAS AERO]  Last Written Prescription Date:  11/15/2017  Last Fill Quantity: 1 Inhaler,  # refills: 11   Last office visit: 11/12/2018 with prescribing provider:  MANDIE Franks   Future Office Visit:   Next 5 appointments (look out 90 days)    Feb 04, 2019  1:15 PM CST  SHORT with Addison [facility]anks MD  [facility] [address] [address] [phone]          18 g 11     Sig: INHALE 2 PUFFS INTO THE LUNGS EVERY 6 HOURS AS NEEDED FOR SHORTNESS OF BREATH / DYSPNEA OR WHEEZING    Asthma Maintenance Inhalers - Anticholinergics Failed - 1/23/2019 12:23 PM       Failed - Asthma control assessment score within normal limits in last 6 months    Please review ACT score.   ACT Total Scores 11/7/2016 5/8/2017 2/6/2018   ACT TOTAL SCORE - - -   ASTHMA ER VISITS - - -   ASTHMA HOSPITALIZATIONS - - -   ACT TOTAL SCORE (Goal Greater than or Equal to 20) 21 25 23   In the past 12 months, how many times did you visit the emergency room for your asthma without being admitted to the hospital? 0 0 0   In the past 12 months, how many times were you hospitalized overnight because of your asthma? 0 0 0            Passed - Patient is age 12 years or older       Passed - Medication is active on med list       Passed - Recent (6 mo) or future (30 days) visit within the authorizing provider's specialty    Patient had office visit in the last 6 months or has a visit in the next 30 days with authorizing provider or within the authorizing provider's specialty.  See \"Patient Info\" tab in inbasket, or \"Choose Columns\" in Meds & Orders section of the refill encounter.               "

## 2019-01-24 RX ORDER — ALBUTEROL SULFATE 90 UG/1
AEROSOL, METERED RESPIRATORY (INHALATION)
Qty: 18 G | Refills: 11 | Status: SHIPPED | OUTPATIENT
Start: 2019-01-24 | End: 2020-02-13

## 2019-01-24 NOTE — TELEPHONE ENCOUNTER
ACT Total Scores 11/7/2016 5/8/2017 2/6/2018   ACT TOTAL SCORE - - -   ASTHMA ER VISITS - - -   ASTHMA HOSPITALIZATIONS - - -   ACT TOTAL SCORE (Goal Greater than or Equal to 20) 21 25 23   In the past 12 months, how many times did you visit the emergency room for your asthma without being admitted to the hospital? 0 0 0   In the past 12 months, how many times were you hospitalized overnight because of your asthma? 0 0 0     Routing refill request to provider for review/approval because:  Failed ACT

## 2019-02-04 ENCOUNTER — OFFICE VISIT (OUTPATIENT)
Dept: FAMILY MEDICINE | Facility: CLINIC | Age: 64
End: 2019-02-04
Payer: COMMERCIAL

## 2019-02-04 VITALS
HEART RATE: 92 BPM | OXYGEN SATURATION: 98 % | WEIGHT: 177 LBS | RESPIRATION RATE: 18 BRPM | SYSTOLIC BLOOD PRESSURE: 126 MMHG | BODY MASS INDEX: 29.01 KG/M2 | TEMPERATURE: 98.3 F | DIASTOLIC BLOOD PRESSURE: 76 MMHG

## 2019-02-04 DIAGNOSIS — M54.2 CERVICALGIA: ICD-10-CM

## 2019-02-04 DIAGNOSIS — G25.81 RESTLESS LEG SYNDROME: ICD-10-CM

## 2019-02-04 DIAGNOSIS — G89.4 CHRONIC PAIN SYNDROME: Primary | ICD-10-CM

## 2019-02-04 DIAGNOSIS — M51.379 DEGENERATION OF LUMBAR OR LUMBOSACRAL INTERVERTEBRAL DISC: ICD-10-CM

## 2019-02-04 PROCEDURE — 99214 OFFICE O/P EST MOD 30 MIN: CPT | Performed by: FAMILY MEDICINE

## 2019-02-04 RX ORDER — HYDROCODONE BITARTRATE AND ACETAMINOPHEN 7.5; 325 MG/1; MG/1
1 TABLET ORAL EVERY 4 HOURS PRN
Qty: 90 TABLET | Refills: 0 | Status: SHIPPED | OUTPATIENT
Start: 2019-02-11 | End: 2019-02-04

## 2019-02-04 RX ORDER — HYDROCODONE BITARTRATE AND ACETAMINOPHEN 7.5; 325 MG/1; MG/1
1 TABLET ORAL EVERY 4 HOURS PRN
Qty: 90 TABLET | Refills: 0 | Status: SHIPPED | OUTPATIENT
Start: 2019-03-11 | End: 2019-05-09

## 2019-02-04 RX ORDER — HYDROCODONE BITARTRATE AND ACETAMINOPHEN 7.5; 325 MG/1; MG/1
1 TABLET ORAL EVERY 4 HOURS PRN
Qty: 90 TABLET | Refills: 0 | Status: SHIPPED | OUTPATIENT
Start: 2019-03-11 | End: 2019-02-04

## 2019-02-04 NOTE — PATIENT INSTRUCTIONS
(G89.4) Chronic pain syndrome  (primary encounter diagnosis)    Comment:      Plan: COPD ACTION PLAN, HYDROcodone-acetaminophen           (NORCO) 7.5-325 MG per tablet, DISCONTINUED:           HYDROcodone-acetaminophen (NORCO) 7.5-325 MG           per tablet, DISCONTINUED:           HYDROcodone-acetaminophen (NORCO) 7.5-325 MG           per tablet                   (G25.81) Restless leg syndrome    Comment:      Plan:          (M51.37) Degeneration of lumbar or lumbosacral intervertebral disc    Comment:      Plan: COPD ACTION PLAN, HYDROcodone-acetaminophen           (NORCO) 7.5-325 MG per tablet, DISCONTINUED:           HYDROcodone-acetaminophen (NORCO) 7.5-325 MG           per tablet, DISCONTINUED:           HYDROcodone-acetaminophen (NORCO) 7.5-325 MG           per tablet                   (M54.2) Cervicalgia    Comment:      Plan: HYDROcodone-acetaminophen (NORCO) 7.5-325 MG           per tablet, DISCONTINUED:           HYDROcodone-acetaminophen (NORCO) 7.5-325 MG           per tablet, DISCONTINUED:           HYDROcodone-acetaminophen (NORCO) 7.5-325 MG           per tablet

## 2019-02-04 NOTE — PROGRESS NOTES
SUBJECTIVE:   Claude D Feinstein is a 63 year old male who presents to clinic today for the following health issues:      Medication Followup of meds    Taking Medication as prescribed: yes    Side Effects:  None    Medication Helping Symptoms:  yes       Chronic Pain Follow-Up       Type / Location of Pain:    Analgesia/pain control:       Recent changes:  worse      Overall control: Tolerable with discomfort  Activity level/function:      Daily activities:  Able to do light housework, cooking    Work:  not applicable  Adverse effects:  No  Adherance    Taking medication as directed?  Yes    Participating in other treatments: yes  Risk Factors:    Sleep:  Good    Mood/anxiety:  controlled    Recent family or social stressors:  none noted    Other aggravating factors: none  PHQ-9 SCORE 11/9/2017 5/7/2018 11/12/2018   PHQ-9 Total Score - - -   PHQ-9 Total Score 0 0 0     RAKESH-7 SCORE 11/3/2015 11/7/2016 2/6/2018   Total Score 0 0 0     Encounter-Level CSA - 11/09/2017:    Controlled Substance Agreement - Scan on 11/21/2017  6:30 AM: CONTROLLED SUBSTANCE AGREEMENT (below)       Encounter-Level CSA - 09/08/2015:    Controlled Substance Agreement - Scan on 9/16/2015 11:59 AM: [facility], Controlled Substance Contract, 09-08-15 (below)       Patient-Level CSA:    There are no patient-level csa.        cervical disc disease with some radiculopathy intermittent neck pains.        Restless leg syndrome levodopa carbidopa with good success.        Itchy dry skin symptomatic treatment.        Lumbar disc disease with radiculopathy.    BILATERAL BUTTOCK PAIN         Bilateral TOTAL KNEE ARTHROPLASTY with residual pain.    Improved         Chronic depression anxiety.  Chronic insomnia.  Loss of spouse        Personal history of obesity but now weight is in the normal range.        .  Prior personal history of marijuana usage.                Topic Date Due     FIT Q1 YR  11/17/2017     COPD ACTION PLAN Q1 YR  08/17/2018                .  Current Outpatient Medications   Medication Sig Dispense Refill     albuterol (2.5 MG/3ML) 0.083% neb solution NEBULIZE AND INHALE 1 VIAL EVERY 6 HOURS AS NEEDED FOR SHORTNESS OF BREATH/DYSPNEA OR WHEEZING. 360 mL 7     carbidopa-levodopa (SINEMET)  MG per tablet TAKE 1 TABLET BY MOUTH AT BEDTIME 90 tablet 3     Cholecalciferol (VITAMIN D) 2000 UNITS CAPS Take 1 capsule by mouth daily.       cyclobenzaprine (FLEXERIL) 5 MG tablet TAKE 1 TABLET BY MOUTH TWICE DAILY AS NEEDED FOR MUSCLE SPASMS. 60 tablet 3     fluticasone (FLONASE) 50 MCG/ACT spray INSTILL 1 TO 2 SPRAYS IN EACH NOSTRIL DAILY. 16 g 11     [START ON 3/11/2019] HYDROcodone-acetaminophen (NORCO) 7.5-325 MG per tablet Take 1 tablet by mouth every 4 hours as needed for pain maximum 3-4 tablet(s) per day 90 tablet 0     lidocaine (ASPERCREME W/LIDOCAINE) 4 % CREA 4% topical cream Apply topically once as needed for mild pain 120 g 11     Melatonin (MELATONIN TR) 10 MG TBCR Take 1 tablet by mouth every evening 30 tablet 11     mineral oil-white petrolatum (MINERIN, EUCERIN) CREA Apply topically two times daily 454 g 11     ORDER FOR DME autocpap 6-10 cm       Saw Palmetto, Serenoa repens, (SAW PALMETTO FRUIT PO) Take  by mouth.       sildenafil (REVATIO) 20 MG tablet Take 3-5 tablets ( mg) by mouth daily as needed 90 tablet 11     simvastatin (ZOCOR) 10 MG tablet Take 1 tablet (10 mg) by mouth At Bedtime 30 tablet 0     Skin Protectants, Misc. (EUCERIN) cream Apply topically as needed for dry skin 454 g G     tiotropium (SPIRIVA HANDIHALER) 18 MCG inhalation capsule Inhale contents of one capsule daily. 90 capsule 3     triamcinolone (KENALOG) 0.1 % cream APPLY SPARINGLY TO THE AFFECTED AREA THREE TIMES A DAY AS NEEDED. 30 g 0     triamcinolone (KENALOG) 0.1 % ointment TWICE DAILY PRN AS NEEDED AND HOLD WHEN IN REMISSION 89 g 3     VENTOLIN  (90 Base) MCG/ACT inhaler INHALE 2 PUFFS INTO THE LUNGS EVERY 6 HOURS AS NEEDED FOR  SHORTNESS OF BREATH / DYSPNEA OR WHEEZING 18 g 11     albuterol (PROAIR HFA) 108 (90 BASE) MCG/ACT inhaler Inhale 2 puffs into the lungs 4 times daily as needed.       simvastatin (ZOCOR) 10 MG tablet Take 1 tablet by mouth At Bedtime.                No Known Allergies      Immunization History   Administered Date(s) Administered     Influenza (H1N1) 2010     Influenza (IIV3) PF 09/15/2010, 2011, 10/16/2012, 2013     Influenza Vaccine IM 3yrs+ 4 Valent IIV4 2014, 2018     Pneumococcal 23 valent 2012     TDAP Vaccine (Boostrix) 2018     Tdap (Adacel,Boostrix) 2007     Zoster vaccine recombinant adjuvanted (SHINGRIX) 2018     Zoster vaccine, live 2017               reports that he drinks alcohol.          reports that he does not use drugs.        family history includes Alzheimer Disease in his mother.        indicated that his mother is . He indicated that his father is .             has a past surgical history that includes Foot surgery (); knee surgery (); Ankle surgery (2007); orthopedic surgery (); carpal tunnel release rt/lt; and Head and neck surgery ().         reports that he does not engage in sexual activity.    .  Pediatric History   Patient Guardian Status     Not on file     Other Topics Concern     Parent/sibling w/ CABG, MI or angioplasty before 65F 55M? No   Social History Narrative     Not on file               reports that he has been smoking cigarettes.  He has been smoking about 1.00 pack per day. he has never used smokeless tobacco.        Medical, social, surgical, and family histories reviewed.        Labs reviewed in EPIC  Patient Active Problem List   Diagnosis     Degeneration of cervical intervertebral disc     Restless legs syndrome (RLS)     Pruritic disorder     Degeneration of lumbar or lumbosacral intervertebral disc     External hemorrhoids     Primary localized osteoarthrosis, lower leg      Prurigo     Illiterate     DALTON (obstructive sleep apnea)- mild (AHI 5.9)     Hyperlipidemia with target LDL less than 130     Chronic pain of right knee     Major depression, recurrent (H)     Major depressive disorder, recurrent episode, moderate (H)     Adjustment disorder with depressed mood     Neck pain     Major depression in partial remission (H)     Mixed anxiety depressive disorder     Advance Care Planning     Anxiety     Chronic pain syndrome     Other chronic pain     Midline low back pain with sciatica     Status post total bilateral knee replacement     Simple chronic bronchitis (H)     Psychophysiologic insomnia       Past Surgical History:   Procedure Laterality Date     ANKLE SURGERY  7/16/2007    synovectomy     CARPAL TUNNEL RELEASE RT/LT       FOOT SURGERY  1991    L bunionectomy     HEAD & NECK SURGERY  1958    plate in head     KNEE SURGERY  1991    R Knee Arthroscopy     ORTHOPEDIC SURGERY  1990    arm surgery laceration R forearm         Social History     Tobacco Use     Smoking status: Current Every Day Smoker     Packs/day: 1.00     Types: Cigarettes     Last attempt to quit: 3/10/2015     Years since quitting: 3.9     Smokeless tobacco: Never Used   Substance Use Topics     Alcohol use: Yes       Family History   Problem Relation Age of Onset     Alzheimer Disease Mother              Current Outpatient Medications   Medication Sig Dispense Refill     albuterol (2.5 MG/3ML) 0.083% neb solution NEBULIZE AND INHALE 1 VIAL EVERY 6 HOURS AS NEEDED FOR SHORTNESS OF BREATH/DYSPNEA OR WHEEZING. 360 mL 7     carbidopa-levodopa (SINEMET)  MG per tablet TAKE 1 TABLET BY MOUTH AT BEDTIME 90 tablet 3     Cholecalciferol (VITAMIN D) 2000 UNITS CAPS Take 1 capsule by mouth daily.       cyclobenzaprine (FLEXERIL) 5 MG tablet TAKE 1 TABLET BY MOUTH TWICE DAILY AS NEEDED FOR MUSCLE SPASMS. 60 tablet 3     fluticasone (FLONASE) 50 MCG/ACT spray INSTILL 1 TO 2 SPRAYS IN EACH NOSTRIL DAILY. 16 g 11      [START ON 3/11/2019] HYDROcodone-acetaminophen (NORCO) 7.5-325 MG per tablet Take 1 tablet by mouth every 4 hours as needed for pain maximum 3-4 tablet(s) per day 90 tablet 0     lidocaine (ASPERCREME W/LIDOCAINE) 4 % CREA 4% topical cream Apply topically once as needed for mild pain 120 g 11     Melatonin (MELATONIN TR) 10 MG TBCR Take 1 tablet by mouth every evening 30 tablet 11     mineral oil-white petrolatum (MINERIN, EUCERIN) CREA Apply topically two times daily 454 g 11     ORDER FOR DME autocpap 6-10 cm       Saw Palmetto, Serenoa repens, (SAW PALMETTO FRUIT PO) Take  by mouth.       sildenafil (REVATIO) 20 MG tablet Take 3-5 tablets ( mg) by mouth daily as needed 90 tablet 11     simvastatin (ZOCOR) 10 MG tablet Take 1 tablet (10 mg) by mouth At Bedtime 30 tablet 0     Skin Protectants, Misc. (EUCERIN) cream Apply topically as needed for dry skin 454 g G     tiotropium (SPIRIVA HANDIHALER) 18 MCG inhalation capsule Inhale contents of one capsule daily. 90 capsule 3     triamcinolone (KENALOG) 0.1 % cream APPLY SPARINGLY TO THE AFFECTED AREA THREE TIMES A DAY AS NEEDED. 30 g 0     triamcinolone (KENALOG) 0.1 % ointment TWICE DAILY PRN AS NEEDED AND HOLD WHEN IN REMISSION 89 g 3     VENTOLIN  (90 Base) MCG/ACT inhaler INHALE 2 PUFFS INTO THE LUNGS EVERY 6 HOURS AS NEEDED FOR SHORTNESS OF BREATH / DYSPNEA OR WHEEZING 18 g 11     albuterol (PROAIR HFA) 108 (90 BASE) MCG/ACT inhaler Inhale 2 puffs into the lungs 4 times daily as needed.       simvastatin (ZOCOR) 10 MG tablet Take 1 tablet by mouth At Bedtime.             Recent Labs   Lab Test 11/09/17  1400 08/01/16  1418 03/17/15  1333  06/11/13  1427 01/29/13  1211  05/25/12  1646   A1C  --  5.4  --   --  5.8  --   --   --    LDL 93 66  --   --  55 71  --  90.2   HDL  --  44  --   --  47 50  --  53   TRIG  --  163*  --   --  151* 121  --  189*   ALT  --  56  --   --   --  40  --  86.0*   CR  --  0.88 1.00  --  0.90 0.90  --  0.8    GFRESTIMATED  --  89 77  --  88 88   < >  --    GFRESTBLACK  --  >90 >90  --  >90 >90   < >  --    POTASSIUM  --  3.9 4.3   < > 4.1 4.0  --  4.3   TSH  --   --   --   --  2.73  --   --   --     < > = values in this interval not displayed.            BP Readings from Last 6 Encounters:   02/04/19 126/76   11/12/18 128/76   08/06/18 132/78   05/07/18 102/60   02/06/18 124/78   11/09/17 118/74           Wt Readings from Last 3 Encounters:   02/04/19 80.3 kg (177 lb)   11/12/18 73.9 kg (163 lb)   08/06/18 76.7 kg (169 lb)                 Positive symptoms or findings indicated by bold designation:         ROS: 10 point ROS neg other than the symptoms noted above in the HPI.except  has Degeneration of cervical intervertebral disc; Restless legs syndrome (RLS); Pruritic disorder; Degeneration of lumbar or lumbosacral intervertebral disc; External hemorrhoids; Primary localized osteoarthrosis, lower leg; Prurigo; Illiterate; DALTON (obstructive sleep apnea)- mild (AHI 5.9); Hyperlipidemia with target LDL less than 130; Chronic pain of right knee; Major depression, recurrent (H); Major depressive disorder, recurrent episode, moderate (H); Adjustment disorder with depressed mood; Neck pain; Major depression in partial remission (H); Mixed anxiety depressive disorder; Advance Care Planning; Anxiety; Chronic pain syndrome; Other chronic pain; Midline low back pain with sciatica; Status post total bilateral knee replacement; Simple chronic bronchitis (H); and Psychophysiologic insomnia on their problem list.  Review Of Systems    Skin: negative    Eyes:  PUSTULAR LESION RIGHT LOWER EYELID     Ears/Nose/Throat: negative    Respiratory: No shortness of breath, dyspnea on exertion, cough, or hemoptysis    NEBS ARE IMPROVED     Cardiovascular: negative    Gastrointestinal: negative    Genitourinary: negative    Musculoskeletal: back pain, neck pain, arthritis and joint pain    Neurologic: negative    Psychiatric:  negative    Hematologic/Lymphatic/Immunologic: negative    Endocrine: negative                PE:  /76   Pulse 92   Temp 98.3  F (36.8  C) (Tympanic)   Resp 18   Wt 80.3 kg (177 lb)   SpO2 98%   BMI 29.01 kg/m   Body mass index is 29.01 kg/m .        Constitutional: general appearance, well nourished, well developed, in no acute distress, well developed, appears stated age, normal body habitus, OVER WEIGHT         Eyes:; The patient has normal eyelids sclerae and conjunctivae :          Ears/Nose/Throat: external ear, overall: normal appearance; external nose, overall: benign appearance, normal moujth gums and lips           Neck: thyroid, overall: normal size, normal consistency, nontender,          Respiratory:  palpation of chest, overall: normal excursion,    Clear to percussion and auscultation     NO Tachypnea    NORMAL  Color          Cardiovascular:  Good color with no peripheral edema    Regular sinus rhythm without murmur.  Physiologic heart sounds   Heart is unelarged    .     Chest/Breast: normal shape           Abdominal exam,  Liver and spleen are  unenlarged        Tenderness    Scars              Urogenital; no renal, flank or bladder  tenderness;          Lymphatic: neck nodes,     Other nodes         Musculoskeletal:  Brief ortho exam normal except:           Integument: inspection of skin, no rash, lesions; and, palpation, no induration, no tenderness.          Neurologic mental status, overall: alert and oriented; gait, no ataxia, no unsteadiness; coordination, no tremors; cranial nerves, overall: normal motor, overall: normal bulk, tone.          Psychiatric: orientation/consciousness, overall: oriented to person, place and time; behavior/psychomotor activity, no tics, normal psychomotor activity; mood and affect, overall: normal mood and affect; appearance, overall: well-groomed, good eye contact; speech, overall: normal quality, no aphasia and normal quality, quantity, intact.         Diagnostic Test Results:  Results for orders placed or performed in visit on 11/12/18   Pain Drug Scr UR W Rptd Meds   Result Value Ref Range    Pain Drug SCR UR W RPTD Meds FINAL            ICD-10-CM    1. Chronic pain syndrome G89.4 COPD ACTION PLAN     HYDROcodone-acetaminophen (NORCO) 7.5-325 MG per tablet     DISCONTINUED: HYDROcodone-acetaminophen (NORCO) 7.5-325 MG per tablet     DISCONTINUED: HYDROcodone-acetaminophen (NORCO) 7.5-325 MG per tablet   2. Restless leg syndrome G25.81    3. Degeneration of lumbar or lumbosacral intervertebral disc M51.37 COPD ACTION PLAN     HYDROcodone-acetaminophen (NORCO) 7.5-325 MG per tablet     DISCONTINUED: HYDROcodone-acetaminophen (NORCO) 7.5-325 MG per tablet     DISCONTINUED: HYDROcodone-acetaminophen (NORCO) 7.5-325 MG per tablet   4. Cervicalgia M54.2 HYDROcodone-acetaminophen (NORCO) 7.5-325 MG per tablet     DISCONTINUED: HYDROcodone-acetaminophen (NORCO) 7.5-325 MG per tablet     DISCONTINUED: HYDROcodone-acetaminophen (NORCO) 7.5-325 MG per tablet              .    Side effects benefits and risks thoroughly discussed. .he may come in early if unimproved or getting worse          Please drink 2 glasses of water prior to meals and walk 15-30 minutes after meals        I spent 25 MINUTES SPENT  with patient discussing the following issues   The primary encounter diagnosis was Chronic pain syndrome. Diagnoses of Restless leg syndrome, Degeneration of lumbar or lumbosacral intervertebral disc, and Cervicalgia were also pertinent to this visit. over half of which involved counseling and coordination of care.        There are no Patient Instructions on file for this visit.        ALL THE ABOVE PROBLEMS ARE STABLE AND MED CHANGES AS NOTED        Diet:  MEDITERRANEAN DIET          Exercise:  AS TOLERATED   Exercises Range of motion, balance, isometric, and strengthening exercises 30 repetitions twice daily of involved joints            .JEANNIE LO MD  2/4/2019 1:32 PM  February 4, 2019

## 2019-02-04 NOTE — LETTER
My Depression Action Plan  Name: Claude D Feinstein   Date of Birth 1955  Date: 2/4/2019    My doctor: Addison Franks   My clinic: [address]  [facility] [id number] [phone]          GREEN    ZONE   Good Control    What it looks like:     Things are going generally well. You have normal up s and down s. You may even feel depressed from time to time, but bad moods usually last less than a day.   What you need to do:  1. Continue to care for yourself (see self care plan)  2. Check your depression survival kit and update it as needed  3. Follow your physician s recommendations including any medication.  4. Do not stop taking medication unless you consult with your physician first.           YELLOW         ZONE Getting Worse    What it looks like:     Depression is starting to interfere with your life.     It may be hard to get out of bed; you may be starting to isolate yourself from others.    Symptoms of depression are starting to last most all day and this has happened for several days.     You may have suicidal thoughts but they are not constant.   What you need to do:     1. Call your care team, your response to treatment will improve if you keep your care team informed of your progress. Yellow periods are signs an adjustment may need to be made.     2. Continue your self-care, even if you have to fake it!    3. Talk to someone in your support network    4. Open up your depression survival kit           RED    ZONE Medical Alert - Get Help    What it looks like:     Depression is seriously interfering with your life.     You may experience these or other symptoms: You can t get out of bed most days, can t work or engage in other necessary activities, you have trouble taking care of basic hygiene, or basic responsibilities, thoughts of suicide or death that will not go away, self-injurious behavior.     What you need  to do:  1. Call your care team and request a same-day appointment. If they are not available (weekends or after hours) call your local crisis line, emergency room or 911.            Depression Self Care Plan / Survival Kit    Self-Care for Depression  Here s the deal. Your body and mind are really not as separate as most people think.  What you do and think affects how you feel and how you feel influences what you do and think. This means if you do things that people who feel good do, it will help you feel better.  Sometimes this is all it takes.  There is also a place for medication and therapy depending on how severe your depression is, so be sure to consult with your medical provider and/ or Behavioral Health Consultant if your symptoms are worsening or not improving.     In order to better manage my stress, I will:    Exercise  Get some form of exercise, every day. This will help reduce pain and release endorphins, the  feel good  chemicals in your brain. This is almost as good as taking antidepressants!  This is not the same as joining a gym and then never going! (they count on that by the way ) It can be as simple as just going for a walk or doing some gardening, anything that will get you moving.      Hygiene   Maintain good hygiene (Get out of bed in the morning, Make your bed, Brush your teeth, Take a shower, and Get dressed like you were going to work, even if you are unemployed).  If your clothes don't fit try to get ones that do.    Diet  I will strive to eat foods that are good for me, drink plenty of water, and avoid excessive sugar, caffeine, alcohol, and other mood-altering substances.  Some foods that are helpful in depression are: complex carbohydrates, B vitamins, flaxseed, fish or fish oil, fresh fruits and vegetables.    Psychotherapy  I agree to participate in Individual Therapy (if recommended).    Medication  If prescribed medications, I agree to take them.  Missing doses can result in  serious side effects.  I understand that drinking alcohol, or other illicit drug use, may cause potential side effects.  I will not stop my medication abruptly without first discussing it with my provider.    Staying Connected With Others  I will stay in touch with my friends, family members, and my primary care provider/team.    Use your imagination  Be creative.  We all have a creative side; it doesn t matter if it s oil painting, sand castles, or mud pies! This will also kick up the endorphins.    Witness Beauty  (AKA stop and smell the roses) Take a look outside, even in mid-winter. Notice colors, textures. Watch the squirrels and birds.     Service to others  Be of service to others.  There is always someone else in need.  By helping others we can  get out of ourselves  and remember the really important things.  This also provides opportunities for practicing all the other parts of the program.    Humor  Laugh and be silly!  Adjust your TV habits for less news and crime-drama and more comedy.    Control your stress  Try breathing deep, massage therapy, biofeedback, and meditation. Find time to relax each day.     My support system    Clinic Contact:  Phone number:    Contact 1:  Phone number:    Contact 2:  Phone number:    Spiritism/:  Phone number:    Therapist:  Phone number:    Local crisis center:    Phone number:    Other community support:  Phone number:

## 2019-02-04 NOTE — LETTER
My COPD Action Plan     Name: Claude D Feinstein    YOB: 1955   Date: 2/4/2019    My doctor: JEANNIE LO MD   My clinic: [address] [address] [phone]  My Controller Medicine: { :298415}   Dose: ***     My Rescue Medicine: { :780225}   Dose: ***     My Flare Up Medicine: { :923807}   Dose: *** FEV-1 (no units)   Date Value   06/13/2014 2.59     FEV1/FVC (no units)   Date Value   06/13/2014 78      My COPD Severity: { :626604}      Use of Oxygen: { :722589}     Make sure you've had your pneumonia   vaccines.          GREEN ZONE       Doing well today      Usual level of activity and exercise    Usual amount of cough and mucus    No shortness of breath    Usual level of health (thinking clearly, sleeping well, feel like eating) Actions:      Take daily medicines    Use oxygen as prescribed    Follow regular exercise and diet plan    Avoid cigarette smoke and other irritants that harm the lungs           YELLOW ZONE          Having a bad day or flare up      Short of breath more than usual    A lot more sputum (mucus) than usual    Sputum looks yellow, green, tan, brown or bloody    More coughing or wheezing    Fever or chills    Less energy; trouble completing activities    Trouble thinking or focusing    Using quick relief inhaler or nebulizer more often    Poor sleep; symptoms wake me up    Do not feel like eating Actions:      Get plenty of rest    Take daily medicines    Use quick relief inhaler every *** hours    If you use oxygen, call you doctor to see if you should adjust your oxygen    Do breathing exercises or other things to help you relax    Let a loved one, friend or neighbor know you are feeling worse    Call your care team if you have 2 or more symptoms.  Start taking steroids or antibiotics if directed by your care team           RED ZONE       Need medical care now      Severe shortness of  breath (feel you can't breathe)    Fever, chills    Not enough breath to do any activity    Trouble coughing up mucus, walking or talking    Blood in mucus    Frequent coughing   Rescue medicines are not working    Not able to sleep because of breathing    Feel confused or drowsy    Chest pain    Actions:      Call your health care team.  If you cannot reach your care team, call 911 or go to the emergency room.        Annual Reminders:  Meet with Care Team, Flu Shot every Fall  Pharmacy: [facility]. [place], [place] - [address] [place] [address]. [address]

## 2019-05-09 ENCOUNTER — OFFICE VISIT (OUTPATIENT)
Dept: FAMILY MEDICINE | Facility: CLINIC | Age: 64
End: 2019-05-09
Payer: COMMERCIAL

## 2019-05-09 VITALS
OXYGEN SATURATION: 100 % | SYSTOLIC BLOOD PRESSURE: 120 MMHG | HEART RATE: 83 BPM | WEIGHT: 183 LBS | TEMPERATURE: 98.7 F | RESPIRATION RATE: 18 BRPM | BODY MASS INDEX: 29.99 KG/M2 | DIASTOLIC BLOOD PRESSURE: 76 MMHG

## 2019-05-09 DIAGNOSIS — M54.2 CERVICALGIA: ICD-10-CM

## 2019-05-09 DIAGNOSIS — M51.379 DEGENERATION OF LUMBAR OR LUMBOSACRAL INTERVERTEBRAL DISC: ICD-10-CM

## 2019-05-09 DIAGNOSIS — G89.4 CHRONIC PAIN SYNDROME: ICD-10-CM

## 2019-05-09 DIAGNOSIS — R21 RASH: ICD-10-CM

## 2019-05-09 DIAGNOSIS — F51.01 PRIMARY INSOMNIA: ICD-10-CM

## 2019-05-09 PROCEDURE — 99214 OFFICE O/P EST MOD 30 MIN: CPT | Performed by: FAMILY MEDICINE

## 2019-05-09 RX ORDER — HYDROCODONE BITARTRATE AND ACETAMINOPHEN 7.5; 325 MG/1; MG/1
1 TABLET ORAL EVERY 4 HOURS PRN
Qty: 90 TABLET | Refills: 0 | Status: SHIPPED | OUTPATIENT
Start: 2019-05-09 | End: 2019-05-09

## 2019-05-09 RX ORDER — HYDROCODONE BITARTRATE AND ACETAMINOPHEN 7.5; 325 MG/1; MG/1
1 TABLET ORAL EVERY 4 HOURS PRN
Qty: 90 TABLET | Refills: 0 | Status: SHIPPED | OUTPATIENT
Start: 2019-07-17 | End: 2019-06-28

## 2019-05-09 RX ORDER — SAW/PYGEUM/BETA/HERB/D3/B6/ZN 30 MG-25MG
1 CAPSULE ORAL EVERY EVENING
Qty: 30 TABLET | Refills: 11 | Status: SHIPPED | OUTPATIENT
Start: 2019-05-09

## 2019-05-09 RX ORDER — TRIAMCINOLONE ACETONIDE 1 MG/G
CREAM TOPICAL
Qty: 30 G | Refills: 0 | Status: SHIPPED | OUTPATIENT
Start: 2019-05-09 | End: 2021-12-20

## 2019-05-09 RX ORDER — HYDROCODONE BITARTRATE AND ACETAMINOPHEN 7.5; 325 MG/1; MG/1
1 TABLET ORAL EVERY 4 HOURS PRN
Qty: 90 TABLET | Refills: 0 | Status: SHIPPED | OUTPATIENT
Start: 2019-06-14 | End: 2019-05-09

## 2019-05-09 ASSESSMENT — ANXIETY QUESTIONNAIRES
GAD7 TOTAL SCORE: 0
6. BECOMING EASILY ANNOYED OR IRRITABLE: NOT AT ALL
7. FEELING AFRAID AS IF SOMETHING AWFUL MIGHT HAPPEN: NOT AT ALL
3. WORRYING TOO MUCH ABOUT DIFFERENT THINGS: NOT AT ALL
5. BEING SO RESTLESS THAT IT IS HARD TO SIT STILL: NOT AT ALL
IF YOU CHECKED OFF ANY PROBLEMS ON THIS QUESTIONNAIRE, HOW DIFFICULT HAVE THESE PROBLEMS MADE IT FOR YOU TO DO YOUR WORK, TAKE CARE OF THINGS AT HOME, OR GET ALONG WITH OTHER PEOPLE: NOT DIFFICULT AT ALL
2. NOT BEING ABLE TO STOP OR CONTROL WORRYING: NOT AT ALL
1. FEELING NERVOUS, ANXIOUS, OR ON EDGE: NOT AT ALL

## 2019-05-09 ASSESSMENT — PATIENT HEALTH QUESTIONNAIRE - PHQ9
SUM OF ALL RESPONSES TO PHQ QUESTIONS 1-9: 0
5. POOR APPETITE OR OVEREATING: NOT AT ALL

## 2019-05-09 NOTE — PATIENT INSTRUCTIONS
(M51.37) Degeneration of lumbar or lumbosacral intervertebral disc    Comment:      Plan: HYDROcodone-acetaminophen (NORCO) 7.5-325 MG           per tablet, DISCONTINUED:           HYDROcodone-acetaminophen (NORCO) 7.5-325 MG           per tablet, DISCONTINUED:           HYDROcodone-acetaminophen (NORCO) 7.5-325 MG           per tablet                   (M54.2) Cervicalgia    Comment:      Plan: HYDROcodone-acetaminophen (NORCO) 7.5-325 MG           per tablet, DISCONTINUED:           HYDROcodone-acetaminophen (NORCO) 7.5-325 MG           per tablet, DISCONTINUED:           HYDROcodone-acetaminophen (NORCO) 7.5-325 MG           per tablet                   (G89.4) Chronic pain syndrome    Comment:      Plan: HYDROcodone-acetaminophen (NORCO) 7.5-325 MG           per tablet, DISCONTINUED:           HYDROcodone-acetaminophen (NORCO) 7.5-325 MG           per tablet, DISCONTINUED:           HYDROcodone-acetaminophen (NORCO) 7.5-325 MG           per tablet                   (R21) Rash    Comment:      Plan: triamcinolone (KENALOG) 0.1 % external cream                   (F51.01) Primary insomnia    Comment:      Plan: Melatonin (MELATONIN TR) 10 MG TBCR

## 2019-05-09 NOTE — PROGRESS NOTES
"  SUBJECTIVE:   Claude D Feinstein is a 63 year old male who presents to clinic today for the following   health issues:        Medication Followup of pain meds    Taking Medication as prescribed: yes    Side Effects:  None    Medication Helping Symptoms:  yes       Antibiotic  Requests an RX for amoxicillin for procedures      KNEE PAIN     TOTAL KNEE ARTHROPLASTY  BILATERAL KNEE PAIN     NECK PAIN ABOUT THE SAME  INTERMITTENT PAIN     BILATERAL TOTAL KNEE ARTHROPLASTY     CHRONIC LOWER BACK PAIN INTERMITTENT  WORSE IN AM     RIGHT MORE THAN LEFT     RADICULOPATHY BILATERAL     DEPRESSION IMPROVED     ITCHING SKIN INTERMITTENT     NEEDS \":ITCH\" PILLS     HEMORRHOIDS DOING OK CHRONIC PAIN SYNDROME     BREATHING OK OCCASIONAL CONGESTION     STILL SMOKES A LITTLE     INSOMNIA  AT TIMES     RARELY USES ALBUTEROL     RESTLESS LEG SYNDROME SINEMT     FLEXERIL with LOWER BACK PAIN FLARE UP     SEASONAL ALLERGIC RHINITIS PRN     NOT USING SPIRIVA                     Topic Date Due     FIT Q1 YR  11/17/2017     ASTHMA CONTROL TEST Q6 MOS  05/12/2019               .  Current Outpatient Medications   Medication Sig Dispense Refill     albuterol (2.5 MG/3ML) 0.083% neb solution NEBULIZE AND INHALE 1 VIAL EVERY 6 HOURS AS NEEDED FOR SHORTNESS OF BREATH/DYSPNEA OR WHEEZING. 360 mL 7     carbidopa-levodopa (SINEMET)  MG per tablet TAKE 1 TABLET BY MOUTH AT BEDTIME 90 tablet 3     Cholecalciferol (VITAMIN D) 2000 UNITS CAPS Take 1 capsule by mouth daily.       cyclobenzaprine (FLEXERIL) 5 MG tablet TAKE 1 TABLET BY MOUTH TWICE DAILY AS NEEDED FOR MUSCLE SPASMS. 60 tablet 3     fluticasone (FLONASE) 50 MCG/ACT spray INSTILL 1 TO 2 SPRAYS IN EACH NOSTRIL DAILY. 16 g 11     [START ON 7/17/2019] HYDROcodone-acetaminophen (NORCO) 7.5-325 MG per tablet Take 1 tablet by mouth every 4 hours as needed for pain maximum 3-4 tablet(s) per day 90 tablet 0     lidocaine (ASPERCREME W/LIDOCAINE) 4 % CREA 4% topical cream Apply topically " once as needed for mild pain 120 g 11     Melatonin (MELATONIN TR) 10 MG TBCR Take 1 tablet by mouth every evening 30 tablet 11     mineral oil-white petrolatum (MINERIN, EUCERIN) CREA Apply topically two times daily 454 g 11     ORDER FOR DME autocpap 6-10 cm       Saw Palmetto, Serenoa repens, (SAW PALMETTO FRUIT PO) Take  by mouth.       simvastatin (ZOCOR) 10 MG tablet Take 1 tablet (10 mg) by mouth At Bedtime 30 tablet 0     Skin Protectants, Misc. (EUCERIN) cream Apply topically as needed for dry skin 454 g G     triamcinolone (KENALOG) 0.1 % external cream APPLY SPARINGLY TO THE AFFECTED AREA THREE TIMES A DAY AS NEEDED. 30 g 0     VENTOLIN  (90 Base) MCG/ACT inhaler INHALE 2 PUFFS INTO THE LUNGS EVERY 6 HOURS AS NEEDED FOR SHORTNESS OF BREATH / DYSPNEA OR WHEEZING 18 g 11              No Known Allergies      Immunization History   Administered Date(s) Administered     Influenza (H1N1) 2010     Influenza (IIV3) PF 09/15/2010, 2011, 10/16/2012, 2013     Influenza Vaccine IM 3yrs+ 4 Valent IIV4 2014, 2018     MMR 2019     Pneumococcal 23 valent 2012     TDAP Vaccine (Boostrix) 2018     Tdap (Adacel,Boostrix) 2007     Zoster vaccine recombinant adjuvanted (SHINGRIX) 2018, 2019     Zoster vaccine, live 2017               reports that he drinks alcohol.          reports that he does not use drugs.        family history includes Alzheimer Disease in his mother.        indicated that his mother is . He indicated that his father is .             has a past surgical history that includes Foot surgery (); knee surgery (); Ankle surgery (2007); orthopedic surgery (); carpal tunnel release rt/lt; and Head and neck surgery ().         reports that he does not currently engage in sexual activity but has had partners who are Female.    .  Pediatric History   Patient Guardian Status     Not on file     Other  Topics Concern     Parent/sibling w/ CABG, MI or angioplasty before 65F 55M? No   Social History Narrative     Not on file               reports that he has been smoking cigarettes.  He has been smoking about 1.00 pack per day. He has never used smokeless tobacco.        Medical, social, surgical, and family histories reviewed.        Labs reviewed in EPIC  Patient Active Problem List   Diagnosis     Degeneration of cervical intervertebral disc     Restless legs syndrome (RLS)     Pruritic disorder     Degeneration of lumbar or lumbosacral intervertebral disc     External hemorrhoids     Primary localized osteoarthrosis, lower leg     Prurigo     Illiterate     DALTON (obstructive sleep apnea)- mild (AHI 5.9)     Hyperlipidemia with target LDL less than 130     Chronic pain of right knee     Major depression, recurrent (H)     Major depressive disorder, recurrent episode, moderate (H)     Adjustment disorder with depressed mood     Neck pain     Major depression in partial remission (H)     Mixed anxiety depressive disorder     Advance Care Planning     Anxiety     Chronic pain syndrome     Other chronic pain     Midline low back pain with sciatica     Status post total bilateral knee replacement     Simple chronic bronchitis (H)     Psychophysiologic insomnia       Past Surgical History:   Procedure Laterality Date     ANKLE SURGERY  2007    synovectomy     CARPAL TUNNEL RELEASE RT/LT       FOOT SURGERY      L bunionectomy     HEAD & NECK SURGERY      plate in head     KNEE SURGERY      R Knee Arthroscopy     ORTHOPEDIC SURGERY      arm surgery laceration R forearm         Social History     Tobacco Use     Smoking status: Current Every Day Smoker     Packs/day: 1.00     Types: Cigarettes     Last attempt to quit: 3/10/2015     Years since quittin.1     Smokeless tobacco: Never Used   Substance Use Topics     Alcohol use: Yes       Family History   Problem Relation Age of Onset     Alzheimer  Disease Mother              Current Outpatient Medications   Medication Sig Dispense Refill     albuterol (2.5 MG/3ML) 0.083% neb solution NEBULIZE AND INHALE 1 VIAL EVERY 6 HOURS AS NEEDED FOR SHORTNESS OF BREATH/DYSPNEA OR WHEEZING. 360 mL 7     carbidopa-levodopa (SINEMET)  MG per tablet TAKE 1 TABLET BY MOUTH AT BEDTIME 90 tablet 3     Cholecalciferol (VITAMIN D) 2000 UNITS CAPS Take 1 capsule by mouth daily.       cyclobenzaprine (FLEXERIL) 5 MG tablet TAKE 1 TABLET BY MOUTH TWICE DAILY AS NEEDED FOR MUSCLE SPASMS. 60 tablet 3     fluticasone (FLONASE) 50 MCG/ACT spray INSTILL 1 TO 2 SPRAYS IN EACH NOSTRIL DAILY. 16 g 11     [START ON 7/17/2019] HYDROcodone-acetaminophen (NORCO) 7.5-325 MG per tablet Take 1 tablet by mouth every 4 hours as needed for pain maximum 3-4 tablet(s) per day 90 tablet 0     lidocaine (ASPERCREME W/LIDOCAINE) 4 % CREA 4% topical cream Apply topically once as needed for mild pain 120 g 11     Melatonin (MELATONIN TR) 10 MG TBCR Take 1 tablet by mouth every evening 30 tablet 11     mineral oil-white petrolatum (MINERIN, EUCERIN) CREA Apply topically two times daily 454 g 11     ORDER FOR DME autocpap 6-10 cm       Saw Palmetto, Serenoa repens, (SAW PALMETTO FRUIT PO) Take  by mouth.       simvastatin (ZOCOR) 10 MG tablet Take 1 tablet (10 mg) by mouth At Bedtime 30 tablet 0     Skin Protectants, Misc. (EUCERIN) cream Apply topically as needed for dry skin 454 g G     triamcinolone (KENALOG) 0.1 % external cream APPLY SPARINGLY TO THE AFFECTED AREA THREE TIMES A DAY AS NEEDED. 30 g 0     VENTOLIN  (90 Base) MCG/ACT inhaler INHALE 2 PUFFS INTO THE LUNGS EVERY 6 HOURS AS NEEDED FOR SHORTNESS OF BREATH / DYSPNEA OR WHEEZING 18 g 11           Recent Labs   Lab Test 11/09/17  1400 08/01/16  1418 03/17/15  1333  06/11/13  1427 01/29/13  1211  05/25/12  1646   A1C  --  5.4  --   --  5.8  --   --   --    LDL 93 66  --   --  55 71  --  90.2   HDL  --  44  --   --  47 50  --  53   TRIG   --  163*  --   --  151* 121  --  189*   ALT  --  56  --   --   --  40  --  86.0*   CR  --  0.88 1.00  --  0.90 0.90  --  0.8   GFRESTIMATED  --  89 77  --  88 88   < >  --    GFRESTBLACK  --  >90 >90  --  >90 >90   < >  --    POTASSIUM  --  3.9 4.3   < > 4.1 4.0  --  4.3   TSH  --   --   --   --  2.73  --   --   --     < > = values in this interval not displayed.            BP Readings from Last 6 Encounters:   05/09/19 120/76   02/04/19 126/76   11/12/18 128/76   08/06/18 132/78   05/07/18 102/60   02/06/18 124/78           Wt Readings from Last 3 Encounters:   05/09/19 83 kg (183 lb)   02/04/19 80.3 kg (177 lb)   11/12/18 73.9 kg (163 lb)                 Positive symptoms or findings indicated by bold designation:         ROS: 10 point ROS neg other than the symptoms noted above in the HPI.except  has Degeneration of cervical intervertebral disc; Restless legs syndrome (RLS); Pruritic disorder; Degeneration of lumbar or lumbosacral intervertebral disc; External hemorrhoids; Primary localized osteoarthrosis, lower leg; Prurigo; Illiterate; DALTON (obstructive sleep apnea)- mild (AHI 5.9); Hyperlipidemia with target LDL less than 130; Chronic pain of right knee; Major depression, recurrent (H); Major depressive disorder, recurrent episode, moderate (H); Adjustment disorder with depressed mood; Neck pain; Major depression in partial remission (H); Mixed anxiety depressive disorder; Advance Care Planning; Anxiety; Chronic pain syndrome; Other chronic pain; Midline low back pain with sciatica; Status post total bilateral knee replacement; Simple chronic bronchitis (H); and Psychophysiologic insomnia on their problem list.  Review Of Systems    Skin: negative    Eyes: negative    Ears/Nose/Throat: negative    Respiratory: No shortness of breath, dyspnea on exertion, cough, or hemoptysis\    STILL SMOKING     Cardiovascular: negative    Gastrointestinal: negative    Genitourinary: negative    Musculoskeletal: back pain,  neck pain, arthritis, joint pain and joint stiffness    BILATERAL TOTAL KNEE ARTHROPLASTY     OSTEOARTHRITIS ANKLES     CHRONIC LOWER BACK PAIN INTERMITTENT with NUMBNESS RIGHT LEG     DISC PAIN NECK     Neurologic:  NUMB ARMS with NECK PAIN     Psychiatric: negative    Hematologic/Lymphatic/Immunologic: negative    Endocrine: negative                PE:  /76   Pulse 83   Temp 98.7  F (37.1  C) (Tympanic)   Resp 18   Wt 83 kg (183 lb)   SpO2 100%   BMI 29.99 kg/m   Body mass index is 29.99 kg/m .        Constitutional: general appearance, well nourished, well developed, in no acute distress, well developed, appears stated age, normal body habitus,  OVER WEIGHT         Eyes:; The patient has normal eyelids sclerae and conjunctivae :          Ears/Nose/Throat: external ear, overall: normal appearance; external nose, overall: benign appearance, normal moujth gums and lips           Neck: thyroid, overall: normal size, normal consistency, nontender,          Respiratory:  palpation of chest, overall: normal excursion,    Clear to percussion and auscultation     NO Tachypnea    NORMAL  Color           Cardiovascular:  Good color with no peripheral edema    Regular sinus rhythm without murmur.  Physiologic heart sounds   Heart is unelarged    .     Chest/Breast: normal shape            Abdominal exam,  Liver and spleen are  unenlarged         Tenderness    Scars              Urogenital; no renal, flank or bladder  tenderness;          Lymphatic: neck nodes,     Other nodes         Musculoskeletal:  Brief ortho exam normal except:   DECREASE RANGE OF MOTION OF BACK         Integument: inspection of skin, no rash, lesions; and, palpation, no induration, no tenderness.          Neurologic mental status, overall: alert and oriented; gait, no ataxia, no unsteadiness; coordination, no tremors; cranial nerves, overall: normal motor, overall: normal bulk, tone.          Psychiatric: orientation/consciousness, overall:  oriented to person, place and time; behavior/psychomotor activity, no tics, normal psychomotor activity; mood and affect, overall: normal mood and affect; appearance, overall: well-groomed, good eye contact; speech, overall: normal quality, no aphasia and normal quality, quantity, intact.        Diagnostic Test Results:  Results for orders placed or performed in visit on 11/12/18   Pain Drug Scr UR W Rptd Meds   Result Value Ref Range    Pain Drug SCR UR W RPTD Meds FINAL            ICD-10-CM    1. Degeneration of lumbar or lumbosacral intervertebral disc M51.37 HYDROcodone-acetaminophen (NORCO) 7.5-325 MG per tablet     DISCONTINUED: HYDROcodone-acetaminophen (NORCO) 7.5-325 MG per tablet     DISCONTINUED: HYDROcodone-acetaminophen (NORCO) 7.5-325 MG per tablet   2. Cervicalgia M54.2 HYDROcodone-acetaminophen (NORCO) 7.5-325 MG per tablet     DISCONTINUED: HYDROcodone-acetaminophen (NORCO) 7.5-325 MG per tablet     DISCONTINUED: HYDROcodone-acetaminophen (NORCO) 7.5-325 MG per tablet   3. Chronic pain syndrome G89.4 HYDROcodone-acetaminophen (NORCO) 7.5-325 MG per tablet     DISCONTINUED: HYDROcodone-acetaminophen (NORCO) 7.5-325 MG per tablet     DISCONTINUED: HYDROcodone-acetaminophen (NORCO) 7.5-325 MG per tablet   4. Rash R21 triamcinolone (KENALOG) 0.1 % external cream   5. Primary insomnia F51.01 Melatonin (MELATONIN TR) 10 MG TBCR              .    Side effects benefits and risks thoroughly discussed. .he may come in early if unimproved or getting worse          Please drink 2 glasses of water prior to meals and walk 15-30 minutes after meals        I spent  25 MINUTES SPENT  with patient discussing the following issues   Diagnoses of Degeneration of lumbar or lumbosacral intervertebral disc, Cervicalgia, Chronic pain syndrome, Rash, and Primary insomnia were pertinent to this visit. over half of which involved counseling and coordination of care.      Patient Instructions     (M51.37) Degeneration of lumbar  or lumbosacral intervertebral disc    Comment:      Plan: HYDROcodone-acetaminophen (NORCO) 7.5-325 MG           per tablet, DISCONTINUED:           HYDROcodone-acetaminophen (NORCO) 7.5-325 MG           per tablet, DISCONTINUED:           HYDROcodone-acetaminophen (NORCO) 7.5-325 MG           per tablet                   (M54.2) Cervicalgia    Comment:      Plan: HYDROcodone-acetaminophen (NORCO) 7.5-325 MG           per tablet, DISCONTINUED:           HYDROcodone-acetaminophen (NORCO) 7.5-325 MG           per tablet, DISCONTINUED:           HYDROcodone-acetaminophen (NORCO) 7.5-325 MG           per tablet                   (G89.4) Chronic pain syndrome    Comment:      Plan: HYDROcodone-acetaminophen (NORCO) 7.5-325 MG           per tablet, DISCONTINUED:           HYDROcodone-acetaminophen (NORCO) 7.5-325 MG           per tablet, DISCONTINUED:           HYDROcodone-acetaminophen (NORCO) 7.5-325 MG           per tablet                   (R21) Rash    Comment:      Plan: triamcinolone (KENALOG) 0.1 % external cream                   (F51.01) Primary insomnia    Comment:      Plan: Melatonin (MELATONIN TR) 10 MG TBCR                                   ALL THE ABOVE PROBLEMS ARE STABLE AND MED CHANGES AS NOTED        Diet:  MEDITERRANEAN DIET         Exercise:  AS TOLERATED   Exercises Range of motion, balance, isometric, and strengthening exercises 30 repetitions twice daily of involved joints            .JEANNIE LO MD 5/9/2019 3:06 PM  May 9, 2019

## 2019-05-09 NOTE — LETTER
My Depression Action Plan  Name: Claude D Feinstein   Date of Birth 1955  Date: 5/9/2019    My doctor: Addison Franks   My clinic: [address]  [facility] [address] [phone]          GREEN    ZONE   Good Control    What it looks like:     Things are going generally well. You have normal up s and down s. You may even feel depressed from time to time, but bad moods usually last less than a day.   What you need to do:  1. Continue to care for yourself (see self care plan)  2. Check your depression survival kit and update it as needed  3. Follow your physician s recommendations including any medication.  4. Do not stop taking medication unless you consult with your physician first.           YELLOW         ZONE Getting Worse    What it looks like:     Depression is starting to interfere with your life.     It may be hard to get out of bed; you may be starting to isolate yourself from others.    Symptoms of depression are starting to last most all day and this has happened for several days.     You may have suicidal thoughts but they are not constant.   What you need to do:     1. Call your care team, your response to treatment will improve if you keep your care team informed of your progress. Yellow periods are signs an adjustment may need to be made.     2. Continue your self-care, even if you have to fake it!    3. Talk to someone in your support network    4. Open up your depression survival kit           RED    ZONE Medical Alert - Get Help    What it looks like:     Depression is seriously interfering with your life.     You may experience these or other symptoms: You can t get out of bed most days, can t work or engage in other necessary activities, you have trouble taking care of basic hygiene, or basic responsibilities, thoughts of suicide or death that will not go away, self-injurious behavior.     What you need  to do:  1. Call your care team and request a same-day appointment. If they are not available (weekends or after hours) call your local crisis line, emergency room or 911.            Depression Self Care Plan / Survival Kit    Self-Care for Depression  Here s the deal. Your body and mind are really not as separate as most people think.  What you do and think affects how you feel and how you feel influences what you do and think. This means if you do things that people who feel good do, it will help you feel better.  Sometimes this is all it takes.  There is also a place for medication and therapy depending on how severe your depression is, so be sure to consult with your medical provider and/ or Behavioral Health Consultant if your symptoms are worsening or not improving.     In order to better manage my stress, I will:    Exercise  Get some form of exercise, every day. This will help reduce pain and release endorphins, the  feel good  chemicals in your brain. This is almost as good as taking antidepressants!  This is not the same as joining a gym and then never going! (they count on that by the way ) It can be as simple as just going for a walk or doing some gardening, anything that will get you moving.      Hygiene   Maintain good hygiene (Get out of bed in the morning, Make your bed, Brush your teeth, Take a shower, and Get dressed like you were going to work, even if you are unemployed).  If your clothes don't fit try to get ones that do.    Diet  I will strive to eat foods that are good for me, drink plenty of water, and avoid excessive sugar, caffeine, alcohol, and other mood-altering substances.  Some foods that are helpful in depression are: complex carbohydrates, B vitamins, flaxseed, fish or fish oil, fresh fruits and vegetables.    Psychotherapy  I agree to participate in Individual Therapy (if recommended).    Medication  If prescribed medications, I agree to take them.  Missing doses can result in  serious side effects.  I understand that drinking alcohol, or other illicit drug use, may cause potential side effects.  I will not stop my medication abruptly without first discussing it with my provider.    Staying Connected With Others  I will stay in touch with my friends, family members, and my primary care provider/team.    Use your imagination  Be creative.  We all have a creative side; it doesn t matter if it s oil painting, sand castles, or mud pies! This will also kick up the endorphins.    Witness Beauty  (AKA stop and smell the roses) Take a look outside, even in mid-winter. Notice colors, textures. Watch the squirrels and birds.     Service to others  Be of service to others.  There is always someone else in need.  By helping others we can  get out of ourselves  and remember the really important things.  This also provides opportunities for practicing all the other parts of the program.    Humor  Laugh and be silly!  Adjust your TV habits for less news and crime-drama and more comedy.    Control your stress  Try breathing deep, massage therapy, biofeedback, and meditation. Find time to relax each day.     My support system    Clinic Contact:  Phone number:    Contact 1:  Phone number:    Contact 2:  Phone number:    [employer]/:  Phone number:    Therapist:  Phone number:    Local crisis center:    Phone number:    Other community support:  Phone number:

## 2019-05-09 NOTE — LETTER
[facility]  05/09/19    Patient: Claude D [facility]  YOB: 1955  Medical Record Number: [id number]                                                                  Opioid / Opioid Plus Controlled Substance Agreement    I understand that my care provider has prescribed an opioid (narcotic) controlled substance to help manage my condition(s). I am taking this medicine to help me function or work. I know this is strong medicine, and that it can cause serious side effects. Opioid medicine can be sedating, addicting and may cause a dependency on the drug. They can affect my ability to drive or think, and cause depression. They need to be taken exactly as prescribed. Combining opioids with certain medicines or chemicals (such as cocaine, sedatives and tranquilizers, sleeping pills, meth) can be dangerous or even fatal. Also, if I stop opioids suddenly, I may have severe withdrawal symptoms. Last, I understand that opioids do not work for all types of pain nor for all patients. If not helpful, I may be asked to stop them.        The risks, benefits, and side effects of these medicine(s) were explained to me. I agree that:    1. I will take part in other treatments as advised by my care team. This may be psychiatry or counseling, physical therapy, behavioral therapy, group treatment or a referral to a pain clinic. I will reduce or stop my medicine when my care team tells me to do so.  2. I will take my medicines as prescribed. I will not change the dose or schedule unless my care team tells me to. There will be no refills if I  run out early.   I may be contactedwithout warning and asked to complete a urine drug test or pill count at any time.   3. I will keep all my appointments, and understand this is part of the monitoring of opioids. My care team may require an office visit for EVERY opioid/controlled substance refill. If I miss appointments or don t follow instructions, my  care team may stop my medicine.  4. I will not ask other providers to prescribe controlled substances, and I will not accept controlled substances from other people. If I need another prescribed controlled substance for a new reason, I will tell my care team within 1 business day.  5. I will use one pharmacy to fill all of my controlled substance prescriptions, and it is up to me to make sure that I do not run out of my medicines on weekends or holidays. If my care team is willing to refill my opioid prescription without a visit, I must request refills only during office hours, refills may take up to 3 days to process, and it may take up to 5 to 7 days for my medicine to be mailed and ready at my pharmacy. Prescriptions will not be mailed anywhere except my pharmacy.        [id number]  Rev 12/18         Registration to scan to EHR                             Page 1 of 2               Controlled Substance Agreement Opioid        [facility]  05/09/19  Patient: Claude D Feinstein  YOB: 1955  Medical Record Number: [id number]                                                                  6. I am responsible for my prescriptions. If the medicine/prescription is lost or stolen, it will not be replaced. I also agree not to share controlled substance medicines with anyone.  7. I agree to not use ANY illegal or recreational drugs. This includes marijuana, cocaine, bath salts or other drugs. I agree not to use alcohol unless my care team says I may.          I agree to give urine samples whenever asked. If I don t give a urine sample, the care team may stop my medicine.    8. If I enroll in the [place] Medical Marijuana program, I will tell my care team. I will also sign an agreement to share my medical records with my care team.   9. I will bring in my list of medicines (or my medicine bottles) each time I come to the clinic.   10. I will tell my care team right away if I become  pregnant or have a new medical problem treated outside of my regular clinic.  11. I understand that this medicine can affect my thinking and judgment. It may be unsafe for me to drive, use machinery and do dangerous tasks. I will not do any of these things until I know how the medicine affects me. If my dose changes, I will wait to see how it affects me. I will contact my care team if I have concerns about medicine side effects.    I understand that if I do not follow any of the conditions above, my prescriptions or treatment may be stopped.      I agree that my provider, clinic care team, and pharmacy may work with any city, state or federal law enforcement agency that investigates the misuse, sale, or other diversion of my controlled medicine. I will allow my provider to discuss my care with or share a copy of this agreement with any other treating provider, pharmacy or emergency room where I receive care. I agree to give up (waive) any right of privacy or confidentiality with respect to these consents.     I have read this agreement and have asked questions about anything I did not understand.      ________________________________________________________________________  Patient signature - Date/Time -  Claude D Feinstein                                      ________________________________________________________________________  Witness signature                                                            ________________________________________________________________________  Provider signature - JEANNIE LO MD      [id number]  Rev 12/18         Registration to scan to EHR                         Page 2 of 2                   Controlled Substance Agreement Opioid           Page 1 of 2  Opioid Pain Medicines (also known as Narcotics)  What You Need to Know    What are opioids?   Opioids are pain medicines that must be prescribed by a doctor.  They are also known as narcotics.    Examples are:     morphine (MS  Contin, Felisha)    oxycodone (Oxycontin)    oxycodone and acetaminophen (Percocet)    hydrocodone and acetaminophen (Vicodin, Norco)     fentanyl patch (Duragesic)     hydromorphone (Dilaudid)     methadone     What do opioids do well?   Opioids are best for short-term pain after a surgery or injury. They also work well for cancer pain. Unlike other pain medicines, they do not cause liver or kidney failure or ulcers. They may help some people with long-lasting (chronic) pain.     What do opioids NOT do well?   Opioids never get rid of pain entirely, and they do not work well for most patients with chronic pain. Opioids do not reduce swelling, one of the causes of pain. They also don t work well for nerve pain.                           For informational purposes only.  Not to replace the advice of your care provider.  Copyright 201 [facility]. All right reserved. [employer] 183594-[id number] 02/18.      Page 2 of 2    Risks and side effects   Talk to your doctor before you start or decide to keep taking one of these medicines. Side effects include:    Lowering your breathing rate enough to cause death    Overdose, including death, especially if taking higher than prescribed doses    Long-term opioid use    Worse depression symptoms; less pleasure in things you usually enjoy    Feeling tired or sluggish    Slower thoughts or cloudy thinking    Being more sensitive to pain over time; pain is harder to control    Trouble sleeping or restless sleep    Changes in hormone levels (for example, less testosterone)    Changes in sex drive or ability to have sex    Constipation    Unsafe driving    Itching and sweating    Feeling dizzy    Nausea, vomiting and dry mouth    What else should I know about opioids?  When someone takes opioids for too long or too often, they become dependent. This means that if you stop or reduce the medicine too quickly, you will have withdrawal symptoms.    Dependence is not the same as  addiction. Addiction is when people keep using a substance that harms their body, their mind or their relations with others. If you have a history of drug or alcohol abuse, taking opioids can cause a relapse.    Over time, opioids don t work as well. Most people will need higher and higher doses. The higher the dose, the more serious the side effects. We don t know the long-term effects of opioids.      Prescribed opioids aren't the best way to manage chronic pain    Other ways to manage pain include:      Ibuprofen or acetaminophen.  You should always try this first.      Treat health problems that may be causing pain.      acupuncture or massage, deep breathing, meditation, visual imagery, aromatherapy.      Use heat or ice at the pain site      Physical therapy and exercise      Stop smoking      See a counselor or therapist                                                  People who have used opioids for a long time may have a lower quality of life, worse depression, higher levels of pain and more visits to doctors.    Never share your opioids with others. Be sure to store opioids in a secure place, locked if possible.Young children can easily swallow them and overdose.     You can overdose on opioids.  Signs of overdose include decrease or loss of consciousness, slowed breathing, trouble waking and blue lips.  If someone is worried about overdose, they should call 911.    If you are at risk for overdose, you may get naloxone (Narcan, a medicine that reverses the effects of opioids.  If you overdose, a friend or family member can give you Narcan while waiting for the ambulance.  They need to know the signs of overdose and how to give Narcan.    While you're taking opioids:    Don't use alcohol or street drugs. Taking them together can cause death.    Don't take any of these medicines unless your doctor says its okay.  Taking these with opioids can cause death.    Benzodiazepines (such as lorazepam         or  diazepam)    Muscle relaxers (such as cyclobenzaprine)    sleeping pills    other opioids    Safe disposal of opioids  Find your area drug take-back program, your pharmacy mail-back program, buy a special disposal bag (such as Deterra) from your pharmacy or flush them down the toilet.  Use the guidelines at:  www.fda.gov/drugs/resourcesforyou

## 2019-05-10 ASSESSMENT — ANXIETY QUESTIONNAIRES: GAD7 TOTAL SCORE: 0

## 2019-06-13 ENCOUNTER — TELEPHONE (OUTPATIENT)
Dept: FAMILY MEDICINE | Facility: CLINIC | Age: 64
End: 2019-06-13

## 2019-06-13 DIAGNOSIS — M54.2 CERVICALGIA: ICD-10-CM

## 2019-06-13 DIAGNOSIS — M51.379 DEGENERATION OF LUMBAR OR LUMBOSACRAL INTERVERTEBRAL DISC: ICD-10-CM

## 2019-06-13 DIAGNOSIS — G89.4 CHRONIC PAIN SYNDROME: ICD-10-CM

## 2019-06-13 NOTE — TELEPHONE ENCOUNTER
HYDROcodone-acetaminophen (NORCO) 7.5-325 MG per tablet      Last Written Prescription Date:  07/17/2019  Last Fill Quantity: 90,   # refills: 0  Last Office Visit: 05/09/2019  Future Office visit:       Routing refill request to provider for review/approval because:  Drug not on the [facility], [facility] or [facility] refill protocol or controlled substance  Requested Prescriptions   Pending Prescriptions Disp Refills     HYDROcodone-acetaminophen (NORCO) 7.5-325 MG per tablet 90 tablet 0     Sig: Take 1 tablet by mouth every 4 hours as needed for pain maximum 3-4 tablet(s) per day       There is no refill protocol information for this order

## 2019-06-13 NOTE — TELEPHONE ENCOUNTER
Reason for Call:  Medication or medication refill:    Do you use a [facility]?  Name of the pharmacy and phone number for the current request:  [facility]?    Name of the medication requested: HYDROcodone-acetaminophen (NORCO) 7.5-325 MG per tablet    Other request:Claude is wanting a refill on medication.  Patient says he picks this up at [facility]. I asked patient if this is a prescription he picked up at the clinic and then takes it to the pharmacy. Claude was not clear on the answer.     Claude says he is out of the medication.    Can we leave a detailed message on this number? unsure    Phone number patient can be reached at: Cell number on file:    Telephone Information:   Mobile [phone]       Best Time: any    Call taken on 6/13/2019 at 3:17 PM by Alba Montano

## 2019-06-13 NOTE — TELEPHONE ENCOUNTER
Controlled Substance Refill Request for Norco 7.5-325 mg  Problem List Complete:  Yes  Patient is followed by JEANNIE LO MD for ongoing prescription of pain medication.  All refills should be approved by this provider, or covering partner.    Medication(s):  NORCO 7.5MG PO FOUR TIMES DAILY .   Maximum quantity per month:  120   Clinic visit frequency required: Q 3 months     Controlled substance agreement:  Encounter-Level CSA - 9/8/15:               Controlled Substance Agreement - Scan on 9/16/2015 11:59 AM : [facility], Controlled Substance Contract, 09-08-15 (below)            Pain Clinic evaluation in the past: No    DIRE Total Score(s):    11/2/2016   Total Score 17       Last [facility] website verification:  6/13/19 no concerns   [email or web address]    Routing refill request to provider for review/approval because:  Drug not on the [facility] refill protocol       Patient is out of medication

## 2019-06-14 RX ORDER — HYDROCODONE BITARTRATE AND ACETAMINOPHEN 7.5; 325 MG/1; MG/1
1 TABLET ORAL EVERY 4 HOURS PRN
Qty: 90 TABLET | Refills: 0 | Status: CANCELLED | OUTPATIENT
Start: 2019-07-17

## 2019-06-14 NOTE — TELEPHONE ENCOUNTER
He was given 3 refills at last office visit 05?09/2019    What is happening here    Did he lose them    Addison Franks Jr., MD

## 2019-06-14 NOTE — TELEPHONE ENCOUNTER
Left voice message asking pt to call triage back. Triage, please find out if pt lost Rx's given at last office visit.

## 2019-06-14 NOTE — TELEPHONE ENCOUNTER
Patient did not lose scripts. He is upset because he has been out of meds for 4 days due to the start date on his scripts. His start date last month was 5/10, and this month it was 6/14.  Next month, he won't be able to fill until 7/17. Patient upset that the start dates are not consistent, and then he is out of meds for days.   States that he's been drinking to numb the pain since he ran out of meds. He says he does NOT drink and take narcotics. Pt states that he will just go back to drinking if he's going to be out of meds for days at a time.       Routing to provider to advise if new Rx for Norco should be written with an earlier start date, or if can give approval for early fill next month (7/14).

## 2019-06-17 NOTE — TELEPHONE ENCOUNTER
Routing to PCP. Please review.     Patient is requesting new script for July written by PCP for a start date of 7/12/2019 so he does not have to go without for 4 days.

## 2019-06-20 RX ORDER — ZOSTER VACCINE RECOMBINANT, ADJUVANTED 50 MCG/0.5
KIT INTRAMUSCULAR
COMMUNITY
Start: 2019-03-06

## 2019-06-20 RX ORDER — HYDROCODONE BITARTRATE AND ACETAMINOPHEN 7.5; 325 MG/1; MG/1
1 TABLET ORAL EVERY 4 HOURS PRN
Qty: 90 TABLET | Refills: 0 | Status: CANCELLED | OUTPATIENT
Start: 2019-07-17

## 2019-06-21 NOTE — TELEPHONE ENCOUNTER
Patient Contact    Attempt # 1    Was call answered?  No.  Left message on voicemail with information to call the clinic back for a message from provider.     On call back: Please tell patient to bring hard script for Norco in so [facility] can replace it for him with an updated one.

## 2019-06-22 DIAGNOSIS — E78.5 HYPERLIPIDEMIA WITH TARGET LDL LESS THAN 130: Chronic | ICD-10-CM

## 2019-06-24 NOTE — TELEPHONE ENCOUNTER
"Requested Prescriptions   Pending Prescriptions Disp Refills     simvastatin (ZOCOR) 10 MG tablet [Pharmacy Med Name: SIMVASTATIN 10 MG TABLET 10 TAB]  Last Written Prescription Date:  8/30/2017  Last Fill Quantity: 30 tablet,  # refills: 0   Last office visit: 5/9/2019 with prescribing provider:  MANDIE Franks   Future Office Visit:     90 tablet 1     Sig: TAKE 1 TABLET (10 MG) BY MOUTH AT BEDTIME       Statins Protocol Failed - 6/22/2019  8:57 AM        Failed - LDL on file in past 12 months     Recent Labs   Lab Test 11/09/17  1400   LDL 93             Passed - No abnormal creatine kinase in past 12 months     No lab results found.             Passed - Recent (12 mo) or future (30 days) visit within the authorizing provider's specialty     Patient had office visit in the last 12 months or has a visit in the next 30 days with authorizing provider or within the authorizing provider's specialty.  See \"Patient Info\" tab in inbasket, or \"Choose Columns\" in Meds & Orders section of the refill encounter.              Passed - Medication is active on med list        Passed - Patient is age 18 or older           "

## 2019-06-25 RX ORDER — SIMVASTATIN 10 MG
TABLET ORAL
Qty: 90 TABLET | Refills: 1 | Status: SHIPPED | OUTPATIENT
Start: 2019-06-25 | End: 2019-11-15

## 2019-06-25 NOTE — TELEPHONE ENCOUNTER
Routing refill request to provider for review/approval because:  Rx has not been written since 8/30/17  Is patient supposed to be taking this medication??

## 2019-06-27 NOTE — TELEPHONE ENCOUNTER
Per Vicki at , patient came into clinic to  the new RX and turn in the July RX. New one was not ready at the .  Patient did as he was asked, but did not receive corrected RX.

## 2019-06-28 RX ORDER — HYDROCODONE BITARTRATE AND ACETAMINOPHEN 7.5; 325 MG/1; MG/1
1 TABLET ORAL EVERY 4 HOURS PRN
Qty: 90 TABLET | Refills: 0 | Status: SHIPPED | OUTPATIENT
Start: 2019-07-12 | End: 2019-08-08

## 2019-08-08 ENCOUNTER — OFFICE VISIT (OUTPATIENT)
Dept: FAMILY MEDICINE | Facility: CLINIC | Age: 64
End: 2019-08-08
Payer: COMMERCIAL

## 2019-08-08 VITALS
WEIGHT: 183 LBS | HEART RATE: 91 BPM | DIASTOLIC BLOOD PRESSURE: 64 MMHG | SYSTOLIC BLOOD PRESSURE: 104 MMHG | BODY MASS INDEX: 29.99 KG/M2 | RESPIRATION RATE: 16 BRPM | TEMPERATURE: 97.6 F | OXYGEN SATURATION: 98 %

## 2019-08-08 DIAGNOSIS — F41.8 MIXED ANXIETY DEPRESSIVE DISORDER: ICD-10-CM

## 2019-08-08 DIAGNOSIS — Z96.653 STATUS POST TOTAL BILATERAL KNEE REPLACEMENT: Primary | ICD-10-CM

## 2019-08-08 DIAGNOSIS — G89.4 CHRONIC PAIN SYNDROME: ICD-10-CM

## 2019-08-08 DIAGNOSIS — M51.379 DEGENERATION OF LUMBAR OR LUMBOSACRAL INTERVERTEBRAL DISC: ICD-10-CM

## 2019-08-08 DIAGNOSIS — F51.04 PSYCHOPHYSIOLOGIC INSOMNIA: ICD-10-CM

## 2019-08-08 DIAGNOSIS — M54.2 CERVICALGIA: ICD-10-CM

## 2019-08-08 DIAGNOSIS — G25.81 RESTLESS LEGS SYNDROME (RLS): ICD-10-CM

## 2019-08-08 DIAGNOSIS — G47.33 OSA (OBSTRUCTIVE SLEEP APNEA): ICD-10-CM

## 2019-08-08 PROCEDURE — 99213 OFFICE O/P EST LOW 20 MIN: CPT | Performed by: FAMILY MEDICINE

## 2019-08-08 RX ORDER — HYDROCODONE BITARTRATE AND ACETAMINOPHEN 7.5; 325 MG/1; MG/1
1 TABLET ORAL EVERY 4 HOURS PRN
Qty: 90 TABLET | Refills: 0 | Status: SHIPPED | OUTPATIENT
Start: 2019-08-08 | End: 2019-09-05

## 2019-08-08 NOTE — PROGRESS NOTES
Subjective     Claude D Feinstein is a 64 year old male who presents to clinic today for the following health issues:    HPI   Medication Followup of pain meds  Patient wants to know how he can get his pain RX's   every month without having an OV    Taking Medication as prescribed: yes    Side Effects:      Medication Helping Symptoms:  Yes    CHRONIC LOWER BACK PAIN AND KNEE PAIN     OSTEOARTHRITIS KNEE PAIN  THREE TIMES DAILY NORCO  7.5MG                Topic Date Due     FIT  11/17/2017               .  Current Outpatient Medications   Medication Sig Dispense Refill     albuterol (2.5 MG/3ML) 0.083% neb solution NEBULIZE AND INHALE 1 VIAL EVERY 6 HOURS AS NEEDED FOR SHORTNESS OF BREATH/DYSPNEA OR WHEEZING. 360 mL 7     carbidopa-levodopa (SINEMET)  MG per tablet TAKE 1 TABLET BY MOUTH AT BEDTIME 90 tablet 3     Cholecalciferol (VITAMIN D) 2000 UNITS CAPS Take 1 capsule by mouth daily.       cyclobenzaprine (FLEXERIL) 5 MG tablet TAKE 1 TABLET BY MOUTH TWICE DAILY AS NEEDED FOR MUSCLE SPASMS. 60 tablet 3     fluticasone (FLONASE) 50 MCG/ACT spray INSTILL 1 TO 2 SPRAYS IN EACH NOSTRIL DAILY. 16 g 11     HYDROcodone-acetaminophen (NORCO) 7.5-325 MG per tablet Take 1 tablet by mouth every 4 hours as needed for pain maximum 3-4 tablet(s) per day 90 tablet 0     lidocaine (ASPERCREME W/LIDOCAINE) 4 % CREA 4% topical cream Apply topically once as needed for mild pain 120 g 11     Melatonin (MELATONIN TR) 10 MG TBCR Take 1 tablet by mouth every evening 30 tablet 11     mineral oil-white petrolatum (MINERIN, EUCERIN) CREA Apply topically two times daily 454 g 11     ORDER FOR DME autocpap 6-10 cm       Saw Palmetto, Serenoa repens, (SAW PALMETTO FRUIT PO) Take  by mouth.       simvastatin (ZOCOR) 10 MG tablet TAKE 1 TABLET (10 MG) BY MOUTH AT BEDTIME 90 tablet 1     Skin Protectants, Misc. (EUCERIN) cream Apply topically as needed for dry skin 454 g G     triamcinolone (KENALOG) 0.1 % external cream APPLY SPARINGLY  TO THE AFFECTED AREA THREE TIMES A DAY AS NEEDED. 30 g 0     VENTOLIN  (90 Base) MCG/ACT inhaler INHALE 2 PUFFS INTO THE LUNGS EVERY 6 HOURS AS NEEDED FOR SHORTNESS OF BREATH / DYSPNEA OR WHEEZING 18 g 11     SHINGRIX injection        Tdap, tetanus-diptheria-acell pertussis, (BOOSTRIX) 5-2.5-18.5 LF-MCG/0.5 injection                 No Known Allergies      Immunization History   Administered Date(s) Administered     Influenza (H1N1) 2010     Influenza (IIV3) PF 09/15/2010, 2011, 10/16/2012, 2013     Influenza Vaccine IM 3yrs+ 4 Valent IIV4 2014, 2018     MMR 2019     Pneumococcal 23 valent 2012     TDAP Vaccine (Boostrix) 2018     Tdap (Adacel,Boostrix) 2007     Zoster vaccine recombinant adjuvanted (SHINGRIX) 2018, 2019     Zoster vaccine, live 2017               reports that he drinks alcohol.          reports that he does not use drugs.        family history includes Alzheimer Disease in his mother.        indicated that his mother is . He indicated that his father is .             has a past surgical history that includes Foot surgery (); knee surgery (); Ankle surgery (2007); orthopedic surgery (); carpal tunnel release rt/lt; and Head and neck surgery ().         reports that he does not currently engage in sexual activity but has had partners who are Female.    .  Pediatric History   Patient Guardian Status     Not on file     Other Topics Concern     Parent/sibling w/ CABG, MI or angioplasty before 65F 55M? No   Social History Narrative     Not on file               reports that he has been smoking cigarettes.  He has been smoking about 1.00 pack per day. He has never used smokeless tobacco.        Medical, social, surgical, and family histories reviewed.        Labs reviewed in EPIC  Patient Active Problem List   Diagnosis     Degeneration of cervical intervertebral disc     Restless legs  syndrome (RLS)     Pruritic disorder     Degeneration of lumbar or lumbosacral intervertebral disc     External hemorrhoids     Primary localized osteoarthrosis, lower leg     Illiterate     DALTON (obstructive sleep apnea)- mild (AHI 5.9)     Hyperlipidemia with target LDL less than 130     Major depression, recurrent (H)     Major depressive disorder, recurrent episode, moderate (H)     Neck pain     Major depression in partial remission (H)     Mixed anxiety depressive disorder     Advance Care Planning     Anxiety     Chronic pain syndrome     Other chronic pain     Midline low back pain with sciatica     Status post total bilateral knee replacement     Simple chronic bronchitis (H)     Psychophysiologic insomnia       Past Surgical History:   Procedure Laterality Date     ANKLE SURGERY  2007    synovectomy     CARPAL TUNNEL RELEASE RT/LT       FOOT SURGERY      L bunionectomy     HEAD & NECK SURGERY      plate in head     KNEE SURGERY      R Knee Arthroscopy     ORTHOPEDIC SURGERY      arm surgery laceration R forearm         Social History     Tobacco Use     Smoking status: Current Every Day Smoker     Packs/day: 1.00     Types: Cigarettes     Last attempt to quit: 3/10/2015     Years since quittin.4     Smokeless tobacco: Never Used   Substance Use Topics     Alcohol use: Yes       Family History   Problem Relation Age of Onset     Alzheimer Disease Mother              Current Outpatient Medications   Medication Sig Dispense Refill     albuterol (2.5 MG/3ML) 0.083% neb solution NEBULIZE AND INHALE 1 VIAL EVERY 6 HOURS AS NEEDED FOR SHORTNESS OF BREATH/DYSPNEA OR WHEEZING. 360 mL 7     carbidopa-levodopa (SINEMET)  MG per tablet TAKE 1 TABLET BY MOUTH AT BEDTIME 90 tablet 3     Cholecalciferol (VITAMIN D) 2000 UNITS CAPS Take 1 capsule by mouth daily.       cyclobenzaprine (FLEXERIL) 5 MG tablet TAKE 1 TABLET BY MOUTH TWICE DAILY AS NEEDED FOR MUSCLE SPASMS. 60 tablet 3      fluticasone (FLONASE) 50 MCG/ACT spray INSTILL 1 TO 2 SPRAYS IN EACH NOSTRIL DAILY. 16 g 11     HYDROcodone-acetaminophen (NORCO) 7.5-325 MG per tablet Take 1 tablet by mouth every 4 hours as needed for pain maximum 3-4 tablet(s) per day 90 tablet 0     lidocaine (ASPERCREME W/LIDOCAINE) 4 % CREA 4% topical cream Apply topically once as needed for mild pain 120 g 11     Melatonin (MELATONIN TR) 10 MG TBCR Take 1 tablet by mouth every evening 30 tablet 11     mineral oil-white petrolatum (MINERIN, EUCERIN) CREA Apply topically two times daily 454 g 11     ORDER FOR DME autocpap 6-10 cm       Saw Palmetto, Serenoa repens, (SAW PALMETTO FRUIT PO) Take  by mouth.       simvastatin (ZOCOR) 10 MG tablet TAKE 1 TABLET (10 MG) BY MOUTH AT BEDTIME 90 tablet 1     Skin Protectants, Misc. (EUCERIN) cream Apply topically as needed for dry skin 454 g G     triamcinolone (KENALOG) 0.1 % external cream APPLY SPARINGLY TO THE AFFECTED AREA THREE TIMES A DAY AS NEEDED. 30 g 0     VENTOLIN  (90 Base) MCG/ACT inhaler INHALE 2 PUFFS INTO THE LUNGS EVERY 6 HOURS AS NEEDED FOR SHORTNESS OF BREATH / DYSPNEA OR WHEEZING 18 g 11     SHINGRIX injection        Tdap, tetanus-diptheria-acell pertussis, (BOOSTRIX) 5-2.5-18.5 LF-MCG/0.5 injection              Recent Labs   Lab Test 11/09/17  1400 08/01/16  1418 03/17/15  1333  06/11/13  1427 01/29/13  1211  05/25/12  1646   A1C  --  5.4  --   --  5.8  --   --   --    LDL 93 66  --   --  55 71   < > 90.2   HDL  --  44  --   --  47 50  --  53   TRIG  --  163*  --   --  151* 121  --  189*   ALT  --  56  --   --   --  40  --  86.0*   CR  --  0.88 1.00  --  0.90 0.90  --  0.8   GFRESTIMATED  --  89 77  --  88 88   < >  --    GFRESTBLACK  --  >90 >90  --  >90 >90   < >  --    POTASSIUM  --  3.9 4.3   < > 4.1 4.0  --  4.3   TSH  --   --   --   --  2.73  --   --   --     < > = values in this interval not displayed.            BP Readings from Last 6 Encounters:   08/08/19 104/64   05/09/19 120/76    02/04/19 126/76   11/12/18 128/76   08/06/18 132/78   05/07/18 102/60           Wt Readings from Last 3 Encounters:   08/08/19 83 kg (183 lb)   05/09/19 83 kg (183 lb)   02/04/19 80.3 kg (177 lb)                 Positive symptoms or findings indicated by bold designation:         ROS: 10 point ROS neg other than the symptoms noted above in the HPI.except  has Degeneration of cervical intervertebral disc; Restless legs syndrome (RLS); Pruritic disorder; Degeneration of lumbar or lumbosacral intervertebral disc; External hemorrhoids; Primary localized osteoarthrosis, lower leg; Illiterate; DALTON (obstructive sleep apnea)- mild (AHI 5.9); Hyperlipidemia with target LDL less than 130; Major depression, recurrent (H); Major depressive disorder, recurrent episode, moderate (H); Neck pain; Major depression in partial remission (H); Mixed anxiety depressive disorder; Advance Care Planning; Anxiety; Chronic pain syndrome; Other chronic pain; Midline low back pain with sciatica; Status post total bilateral knee replacement; Simple chronic bronchitis (H); and Psychophysiologic insomnia on their problem list.  Review Of Systems    Skin: negative    Eyes: negative    Ears/Nose/Throat: negative    Respiratory: No shortness of breath, dyspnea on exertion, cough, or hemoptysis    Cardiovascular: negative    Gastrointestinal: negative    Genitourinary: negative    Musculoskeletal: back pain, neck pain, arthritis, joint stiffness and foot pain    Neurologic: negative    Psychiatric: negative    Hematologic/Lymphatic/Immunologic: negative    Endocrine: negative                PE:  /64   Pulse 91   Temp 97.6  F (36.4  C) (Tympanic)   Resp 16   Wt 83 kg (183 lb)   SpO2 98%   BMI 29.99 kg/m   Body mass index is 29.99 kg/m .        Constitutional: general appearance, well nourished, well developed, in no acute distress, well developed, appears stated age, normal body habitus,  OVER WEIGHT         Eyes:; The patient has normal  eyelids sclerae and conjunctivae :          Ears/Nose/Throat: external ear, overall: normal appearance; external nose, overall: benign appearance, normal moujth gums and lips           Neck: thyroid, overall: normal size, normal consistency, nontender,          Respiratory:  palpation of chest, overall: normal excursion,    Clear to percussion and auscultation     NO Tachypnea    NORMAL  Color          Cardiovascular:  Good color with no peripheral edema    Regular sinus rhythm without murmur.  Physiologic heart sounds   Heart is unelarged    .     Chest/Breast: normal shape           Abdominal exam,  Liver and spleen are  unenlarged         Tenderness    Scars              Urogenital; no renal, flank or bladder  tenderness;          Lymphatic: neck nodes,     Other nodes         Musculoskeletal:  Brief ortho exam normal except:   DECREASE RANGE OF MOTION OF BACK AND NEGATIVE STRAIGHT LEG RAISING TEST     NORMAL RANGE OF MOTION OF HIPS     NORMAL RANGE OF MOTION ANKLES AND FEET         Integument: inspection of skin, no rash, lesions; and, palpation, no induration, no tenderness.          Neurologic mental status, overall: alert and oriented; gait, no ataxia, no unsteadiness; coordination, no tremors; cranial nerves, overall: normal motor, overall: normal bulk, tone.          Psychiatric: orientation/consciousness, overall: oriented to person, place and time; behavior/psychomotor activity, no tics, normal psychomotor activity; mood and affect, overall: normal mood and affect; appearance, overall: well-groomed, good eye contact; speech, overall: normal quality, no aphasia and normal quality, quantity, intact.        Diagnostic Test Results:  Results for orders placed or performed in visit on 11/12/18   Pain Drug Scr UR W Rptd Meds   Result Value Ref Range    Pain Drug SCR UR W RPTD Meds FINAL            ICD-10-CM    1. Status post total bilateral knee replacement Z96.653    2. Chronic pain syndrome G89.4  HYDROcodone-acetaminophen (NORCO) 7.5-325 MG per tablet   3. Degeneration of lumbar or lumbosacral intervertebral disc M51.37 HYDROcodone-acetaminophen (NORCO) 7.5-325 MG per tablet   4. Cervicalgia M54.2 HYDROcodone-acetaminophen (NORCO) 7.5-325 MG per tablet   5. Restless legs syndrome (RLS) G25.81    6. Psychophysiologic insomnia F51.04    7. DALTON (obstructive sleep apnea)- mild (AHI 5.9) G47.33    8. Mixed anxiety depressive disorder F41.8               .    Side effects benefits and risks thoroughly discussed. .he may come in early if unimproved or getting worse          Please drink 2 glasses of water prior to meals and walk 15-30 minutes after meals        I spent 15 MINUTES   with patient discussing the following issues   The primary encounter diagnosis was Status post total bilateral knee replacement. Diagnoses of Chronic pain syndrome, Degeneration of lumbar or lumbosacral intervertebral disc, Cervicalgia, Restless legs syndrome (RLS), Psychophysiologic insomnia, DALTON (obstructive sleep apnea)- mild (AHI 5.9), and Mixed anxiety depressive disorder were also pertinent to this visit. over half of which involved counseling and coordination of care.      Patient Instructions   (Z96.653) Status post total bilateral knee replacement  (primary encounter diagnosis)  Comment:    Plan:      (G89.4) Chronic pain syndrome  Comment:    Plan: HYDROcodone-acetaminophen (NORCO) 7.5-325 MG         per tablet             (M51.37) Degeneration of lumbar or lumbosacral intervertebral disc  Comment:    Plan: HYDROcodone-acetaminophen (NORCO) 7.5-325 MG         per tablet             (M54.2) Cervicalgia  Comment:    Plan: HYDROcodone-acetaminophen (NORCO) 7.5-325 MG         per tablet         NECK PAIN     (G25.81) Restless legs syndrome (RLS)  Comment:    Plan:  SINEMET     (F51.04) Psychophysiologic insomnia  Comment:    Plan:      (G47.33) DALTON (obstructive sleep apnea)- mild (AHI 5.9)  Comment:    Plan:  SLEEP APNEA      (F41.8) Mixed anxiety depressive disorder  Comment:    Plan:  DEPRESSION      JEANNIE LO JR., MD                      ALL THE ABOVE PROBLEMS ARE STABLE AND MED CHANGES AS NOTED        Diet:  MEDITERRANEAN DIET         Exercise:  AS TOLERATED   Exercises Range of motion, balance, isometric, and strengthening exercises 30 repetitions twice daily of involved joints            .JEANNIE LO MD 8/8/2019 3:14 PM  August 8, 2019

## 2019-08-08 NOTE — PATIENT INSTRUCTIONS
(Z96.653) Status post total bilateral knee replacement  (primary encounter diagnosis)  Comment:    Plan:      (G89.4) Chronic pain syndrome  Comment:    Plan: HYDROcodone-acetaminophen (NORCO) 7.5-325 MG         per tablet             (M51.37) Degeneration of lumbar or lumbosacral intervertebral disc  Comment:    Plan: HYDROcodone-acetaminophen (NORCO) 7.5-325 MG         per tablet             (M54.2) Cervicalgia  Comment:    Plan: HYDROcodone-acetaminophen (NORCO) 7.5-325 MG         per tablet         NECK PAIN     (G25.81) Restless legs syndrome (RLS)  Comment:    Plan:  SINEMET     (F51.04) Psychophysiologic insomnia  Comment:    Plan:      (G47.33) DALTON (obstructive sleep apnea)- mild (AHI 5.9)  Comment:    Plan:  SLEEP APNEA     (F41.8) Mixed anxiety depressive disorder  Comment:    Plan:  DEPRESSION      JEANNIE LO JR., MD

## 2019-08-09 ASSESSMENT — ASTHMA QUESTIONNAIRES: ACT_TOTALSCORE: 25

## 2019-08-27 DIAGNOSIS — M54.50 CHRONIC LEFT-SIDED LOW BACK PAIN WITHOUT SCIATICA: ICD-10-CM

## 2019-08-27 DIAGNOSIS — G89.29 CHRONIC LEFT-SIDED LOW BACK PAIN WITHOUT SCIATICA: ICD-10-CM

## 2019-08-27 NOTE — TELEPHONE ENCOUNTER
Requested Prescriptions   Pending Prescriptions Disp Refills     cyclobenzaprine (FLEXERIL) 5 MG tablet [Pharmacy Med Name: CYCLOBENZAPRINE HCL 5 MG TA 5 TAB]  Last Written Prescription Date:  8/6/18  Last Fill Quantity: 60,  # refills: 3   Last office visit: 8/8/2019 with prescribing provider:  MANDIE Franks    Future Office Visit:   Next 5 appointments (look out 90 days)    Sep 05, 2019  1:00 PM CDT  SHORT with Addison [facility]anks MD  [facility] [address] [place] [id number] [phone]          60 tablet 3     Sig: TAKE 1 TABLET BY MOUTH TWICE DAILY AS NEEDED FOR MUSCLE SPASMS.       There is no refill protocol information for this order

## 2019-08-28 RX ORDER — CYCLOBENZAPRINE HCL 5 MG
TABLET ORAL
Qty: 60 TABLET | Refills: 3 | Status: SHIPPED | OUTPATIENT
Start: 2019-08-28 | End: 2020-07-27

## 2019-09-05 ENCOUNTER — OFFICE VISIT (OUTPATIENT)
Dept: FAMILY MEDICINE | Facility: CLINIC | Age: 64
End: 2019-09-05
Payer: COMMERCIAL

## 2019-09-05 VITALS
BODY MASS INDEX: 29.99 KG/M2 | SYSTOLIC BLOOD PRESSURE: 128 MMHG | OXYGEN SATURATION: 99 % | HEART RATE: 117 BPM | RESPIRATION RATE: 16 BRPM | DIASTOLIC BLOOD PRESSURE: 76 MMHG | WEIGHT: 183 LBS | TEMPERATURE: 97.8 F

## 2019-09-05 DIAGNOSIS — J20.9 ACUTE BRONCHITIS, UNSPECIFIED ORGANISM: Primary | ICD-10-CM

## 2019-09-05 DIAGNOSIS — M54.2 CERVICALGIA: ICD-10-CM

## 2019-09-05 DIAGNOSIS — G89.4 CHRONIC PAIN SYNDROME: ICD-10-CM

## 2019-09-05 DIAGNOSIS — M51.379 DEGENERATION OF LUMBAR OR LUMBOSACRAL INTERVERTEBRAL DISC: ICD-10-CM

## 2019-09-05 PROCEDURE — 99214 OFFICE O/P EST MOD 30 MIN: CPT | Performed by: FAMILY MEDICINE

## 2019-09-05 RX ORDER — HYDROCODONE BITARTRATE AND ACETAMINOPHEN 7.5; 325 MG/1; MG/1
1 TABLET ORAL EVERY 4 HOURS PRN
Qty: 90 TABLET | Refills: 0 | Status: SHIPPED | OUTPATIENT
Start: 2019-09-05 | End: 2019-09-26

## 2019-09-05 RX ORDER — AZITHROMYCIN 500 MG/1
500 TABLET, FILM COATED ORAL DAILY
Qty: 3 TABLET | Refills: 0 | Status: SHIPPED | OUTPATIENT
Start: 2019-09-05 | End: 2019-09-05

## 2019-09-05 RX ORDER — PREDNISONE 20 MG/1
20 TABLET ORAL DAILY
Qty: 5 TABLET | Refills: 0 | Status: SHIPPED | OUTPATIENT
Start: 2019-09-05 | End: 2019-09-05

## 2019-09-05 RX ORDER — AZITHROMYCIN 500 MG/1
500 TABLET, FILM COATED ORAL DAILY
Qty: 3 TABLET | Refills: 0 | Status: SHIPPED | OUTPATIENT
Start: 2019-09-05 | End: 2019-12-05

## 2019-09-05 RX ORDER — PREDNISONE 20 MG/1
20 TABLET ORAL DAILY
Qty: 5 TABLET | Refills: 0 | Status: SHIPPED | OUTPATIENT
Start: 2019-09-05 | End: 2021-12-20

## 2019-09-05 NOTE — PATIENT INSTRUCTIONS
(J20.9) Acute bronchitis, unspecified organism  (primary encounter diagnosis)  Comment:    Plan: azithromycin (ZITHROMAX) 500 MG tablet,         predniSONE (DELTASONE) 20 MG tablet             (M51.37) Degeneration of lumbar or lumbosacral intervertebral disc  Comment:    Plan: HYDROcodone-acetaminophen (NORCO) 7.5-325 MG         per tablet             (M54.2) Cervicalgia  Comment:    Plan: HYDROcodone-acetaminophen (NORCO) 7.5-325 MG         per tablet             (G89.4) Chronic pain syndrome  Comment:    Plan: HYDROcodone-acetaminophen (NORCO) 7.5-325 MG         per tablet

## 2019-09-05 NOTE — PROGRESS NOTES
Subjective     Claude D Feinstein is a 64 year old male who presents to clinic today for the following health issues:    HPI   Medication Followup of pain meds    Taking Medication as prescribed: yes    Side Effects:  None    Medication Helping Symptoms:  Yes    Diffuse osteoarthritis with chronic pain syndrome    Has had both knees replaced    Is mechanical low back pain    Cervical disc disease    Chronic osteoarthritis of the ankles    Objective findings  Decreased range of motion of back  Bilateral knee scars  Decreased flexion of the knees  Decreased range of motion of the ankles  Decreased range of motion of the neck  Normal upper lower extremity reflexes  Assessment chronic pain syndrome chronic narcotic usage  Plan 1 month gradual slow taper of Vicodin 7.5 mg 4 times daily as needed   Problem #2  Patient is coming down with acute bronchitis  Coughing congestion some sputum production  Prolongation of expiratory phase and rhonchi in the back of his lungs are noted  Assessment acute bronchitis  Plan treatment prednisone 20 mg daily for 5 days  Azithromycin  500 mg daily for 3 days  Albuterol HFA 2 puffs 4 times daily PRN for cough and wheezing         Topic Date Due     FIT  11/17/2017               .  Current Outpatient Medications   Medication Sig Dispense Refill     azithromycin (ZITHROMAX) 500 MG tablet Take 1 tablet (500 mg) by mouth daily 3 tablet 0     HYDROcodone-acetaminophen (NORCO) 7.5-325 MG per tablet Take 1 tablet by mouth every 4 hours as needed for pain maximum 3-4 tablet(s) per day 90 tablet 0     predniSONE (DELTASONE) 20 MG tablet Take 1 tablet (20 mg) by mouth daily 5 tablet 0     albuterol (2.5 MG/3ML) 0.083% neb solution NEBULIZE AND INHALE 1 VIAL EVERY 6 HOURS AS NEEDED FOR SHORTNESS OF BREATH/DYSPNEA OR WHEEZING. 360 mL 7     carbidopa-levodopa (SINEMET)  MG per tablet TAKE 1 TABLET BY MOUTH AT BEDTIME 90 tablet 3     Cholecalciferol (VITAMIN D) 2000 UNITS CAPS Take 1 capsule by  mouth daily.       cyclobenzaprine (FLEXERIL) 5 MG tablet TAKE 1 TABLET BY MOUTH TWICE DAILY AS NEEDED FOR MUSCLE SPASMS. 60 tablet 3     fluticasone (FLONASE) 50 MCG/ACT spray INSTILL 1 TO 2 SPRAYS IN EACH NOSTRIL DAILY. 16 g 11     lidocaine (ASPERCREME W/LIDOCAINE) 4 % CREA 4% topical cream Apply topically once as needed for mild pain 120 g 11     Melatonin (MELATONIN TR) 10 MG TBCR Take 1 tablet by mouth every evening 30 tablet 11     mineral oil-white petrolatum (MINERIN, EUCERIN) CREA Apply topically two times daily 454 g 11     ORDER FOR DME autocpap 6-10 cm       Saw Palmetto, Serenoa repens, (SAW PALMETTO FRUIT PO) Take  by mouth.       SHINGRIX injection        simvastatin (ZOCOR) 10 MG tablet TAKE 1 TABLET (10 MG) BY MOUTH AT BEDTIME 90 tablet 1     Skin Protectants, Misc. (EUCERIN) cream Apply topically as needed for dry skin 454 g G     Tdap, tetanus-diptheria-acell pertussis, (BOOSTRIX) 5-2.5-18.5 LF-MCG/0.5 injection        triamcinolone (KENALOG) 0.1 % external cream APPLY SPARINGLY TO THE AFFECTED AREA THREE TIMES A DAY AS NEEDED. 30 g 0     VENTOLIN  (90 Base) MCG/ACT inhaler INHALE 2 PUFFS INTO THE LUNGS EVERY 6 HOURS AS NEEDED FOR SHORTNESS OF BREATH / DYSPNEA OR WHEEZING 18 g 11              No Known Allergies      Immunization History   Administered Date(s) Administered     Influenza (H1N1) 2010     Influenza (IIV3) PF 09/15/2010, 2011, 10/16/2012, 2013     Influenza Vaccine IM > 6 months Valent IIV4 2014, 2018     MMR 2019     Pneumococcal 23 valent 2012     TDAP Vaccine (Boostrix) 2018     Tdap (Adacel,Boostrix) 2007     Zoster vaccine recombinant adjuvanted (SHINGRIX) 2018, 2019     Zoster vaccine, live 2017               reports that he drinks alcohol.          reports that he does not use drugs.        family history includes Alzheimer Disease in his mother.        indicated that his mother is . He  indicated that his father is .             has a past surgical history that includes Foot surgery (); knee surgery (); Ankle surgery (2007); orthopedic surgery (); carpal tunnel release rt/lt; and Head and neck surgery ().         reports that he does not currently engage in sexual activity but has had partners who are Female.    .  Pediatric History   Patient Guardian Status     Not on file     Other Topics Concern     Parent/sibling w/ CABG, MI or angioplasty before 65F 55M? No   Social History Narrative     Not on file               reports that he has been smoking cigarettes.  He has been smoking about 1.00 pack per day. He has never used smokeless tobacco.        Medical, social, surgical, and family histories reviewed.        Labs reviewed in EPIC  Patient Active Problem List   Diagnosis     Degeneration of cervical intervertebral disc     Restless legs syndrome (RLS)     Pruritic disorder     Degeneration of lumbar or lumbosacral intervertebral disc     External hemorrhoids     Primary localized osteoarthrosis, lower leg     Illiterate     DALTON (obstructive sleep apnea)- mild (AHI 5.9)     Hyperlipidemia with target LDL less than 130     Major depression, recurrent (H)     Major depressive disorder, recurrent episode, moderate (H)     Neck pain     Major depression in partial remission (H)     Mixed anxiety depressive disorder     Advance Care Planning     Anxiety     Chronic pain syndrome     Other chronic pain     Midline low back pain with sciatica     Status post total bilateral knee replacement     Simple chronic bronchitis (H)     Psychophysiologic insomnia       Past Surgical History:   Procedure Laterality Date     ANKLE SURGERY  2007    synovectomy     CARPAL TUNNEL RELEASE RT/LT       FOOT SURGERY      L bunionectomy     HEAD & NECK SURGERY      plate in head     KNEE SURGERY      R Knee Arthroscopy     ORTHOPEDIC SURGERY      arm surgery laceration R  forearm         Social History     Tobacco Use     Smoking status: Current Every Day Smoker     Packs/day: 1.00     Types: Cigarettes     Last attempt to quit: 3/10/2015     Years since quittin.4     Smokeless tobacco: Never Used   Substance Use Topics     Alcohol use: Yes       Family History   Problem Relation Age of Onset     Alzheimer Disease Mother              Current Outpatient Medications   Medication Sig Dispense Refill     azithromycin (ZITHROMAX) 500 MG tablet Take 1 tablet (500 mg) by mouth daily 3 tablet 0     HYDROcodone-acetaminophen (NORCO) 7.5-325 MG per tablet Take 1 tablet by mouth every 4 hours as needed for pain maximum 3-4 tablet(s) per day 90 tablet 0     predniSONE (DELTASONE) 20 MG tablet Take 1 tablet (20 mg) by mouth daily 5 tablet 0     albuterol (2.5 MG/3ML) 0.083% neb solution NEBULIZE AND INHALE 1 VIAL EVERY 6 HOURS AS NEEDED FOR SHORTNESS OF BREATH/DYSPNEA OR WHEEZING. 360 mL 7     carbidopa-levodopa (SINEMET)  MG per tablet TAKE 1 TABLET BY MOUTH AT BEDTIME 90 tablet 3     Cholecalciferol (VITAMIN D) 2000 UNITS CAPS Take 1 capsule by mouth daily.       cyclobenzaprine (FLEXERIL) 5 MG tablet TAKE 1 TABLET BY MOUTH TWICE DAILY AS NEEDED FOR MUSCLE SPASMS. 60 tablet 3     fluticasone (FLONASE) 50 MCG/ACT spray INSTILL 1 TO 2 SPRAYS IN EACH NOSTRIL DAILY. 16 g 11     lidocaine (ASPERCREME W/LIDOCAINE) 4 % CREA 4% topical cream Apply topically once as needed for mild pain 120 g 11     Melatonin (MELATONIN TR) 10 MG TBCR Take 1 tablet by mouth every evening 30 tablet 11     mineral oil-white petrolatum (MINERIN, EUCERIN) CREA Apply topically two times daily 454 g 11     ORDER FOR DME autocpap 6-10 cm       Saw Palmetto, Serenoa repens, (SAW PALMETTO FRUIT PO) Take  by mouth.       SHINGRIX injection        simvastatin (ZOCOR) 10 MG tablet TAKE 1 TABLET (10 MG) BY MOUTH AT BEDTIME 90 tablet 1     Skin Protectants, Misc. (EUCERIN) cream Apply topically as needed for dry skin 454 g  G     Tdap, tetanus-diptheria-acell pertussis, (BOOSTRIX) 5-2.5-18.5 LF-MCG/0.5 injection        triamcinolone (KENALOG) 0.1 % external cream APPLY SPARINGLY TO THE AFFECTED AREA THREE TIMES A DAY AS NEEDED. 30 g 0     VENTOLIN  (90 Base) MCG/ACT inhaler INHALE 2 PUFFS INTO THE LUNGS EVERY 6 HOURS AS NEEDED FOR SHORTNESS OF BREATH / DYSPNEA OR WHEEZING 18 g 11           Recent Labs   Lab Test 11/09/17  1400 08/01/16  1418 03/17/15  1333  06/11/13  1427 01/29/13  1211  05/25/12  1646   A1C  --  5.4  --   --  5.8  --   --   --    LDL 93 66  --   --  55 71   < > 90.2   HDL  --  44  --   --  47 50  --  53   TRIG  --  163*  --   --  151* 121  --  189*   ALT  --  56  --   --   --  40  --  86.0*   CR  --  0.88 1.00  --  0.90 0.90  --  0.8   GFRESTIMATED  --  89 77  --  88 88   < >  --    GFRESTBLACK  --  >90 >90  --  >90 >90   < >  --    POTASSIUM  --  3.9 4.3   < > 4.1 4.0  --  4.3   TSH  --   --   --   --  2.73  --   --   --     < > = values in this interval not displayed.            BP Readings from Last 6 Encounters:   09/05/19 128/76   08/08/19 104/64   05/09/19 120/76   02/04/19 126/76   11/12/18 128/76   08/06/18 132/78           Wt Readings from Last 3 Encounters:   09/05/19 83 kg (183 lb)   08/08/19 83 kg (183 lb)   05/09/19 83 kg (183 lb)                 Positive symptoms or findings indicated by bold designation:         ROS: 10 point ROS neg other than the symptoms noted above in the HPI.except  has Degeneration of cervical intervertebral disc; Restless legs syndrome (RLS); Pruritic disorder; Degeneration of lumbar or lumbosacral intervertebral disc; External hemorrhoids; Primary localized osteoarthrosis, lower leg; Illiterate; DALTON (obstructive sleep apnea)- mild (AHI 5.9); Hyperlipidemia with target LDL less than 130; Major depression, recurrent (H); Major depressive disorder, recurrent episode, moderate (H); Neck pain; Major depression in partial remission (H); Mixed anxiety depressive disorder;  Advance Care Planning; Anxiety; Chronic pain syndrome; Other chronic pain; Midline low back pain with sciatica; Status post total bilateral knee replacement; Simple chronic bronchitis (H); and Psychophysiologic insomnia on their problem list.  Review Of Systems    Skin: negative    Eyes: negative    Ears/Nose/Throat: negative    Respiratory: No shortness of breath, dyspnea on exertion, cough, or hemoptysis    Cardiovascular: negative    Gastrointestinal: negative    Genitourinary: negative    Musculoskeletal: back pain, neck pain, arthritis, joint pain, joint stiffness, foot pain and bilateral ankle pain    Neurologic: negative    Psychiatric: History of anxiety depression stable    Hematologic/Lymphatic/Immunologic: negative    Endocrine: negative                PE:  /76   Pulse 117   Temp 97.8  F (36.6  C) (Tympanic)   Resp 16   Wt 83 kg (183 lb)   SpO2 99%   BMI 29.99 kg/m   Body mass index is 29.99 kg/m .        Constitutional: general appearance, well nourished, well developed, in no acute distress, well developed, appears stated age, normal body habitus, BMI 29 overweight        Eyes:; The patient has normal eyelids sclerae and conjunctivae :          Ears/Nose/Throat: external ear, overall: normal appearance; external nose, overall: benign appearance, normal moujth gums and lips           Neck: thyroid, overall: normal size, normal consistency, nontender,          Respiratory:  palpation of chest, overall: normal excursion,    Clear to percussion and auscultation     NO Tachypnea    NORMAL  Color           Cardiovascular:  Good color with no peripheral edema    Regular sinus rhythm without murmur.  Physiologic heart sounds   Heart is unelarged    .     Chest/Breast: normal shape           Abdominal exam,  Liver and spleen are  unenlarged        Tenderness    Scars              Urogenital; no renal, flank or bladder  tenderness;          Lymphatic: neck nodes,     Other nodes          Musculoskeletal:  Brief ortho exam normal except:   Decreased range of motion of back  Bilateral knee scars  He is to increase range of motion of the ankles        Integument: inspection of skin, no rash, lesions; and, palpation, no induration, no tenderness.          Neurologic mental status, overall: alert and oriented; gait, no ataxia, no unsteadiness; coordination, no tremors; cranial nerves, overall: normal motor, overall: normal bulk, tone.          Psychiatric: orientation/consciousness, overall: oriented to person, place and time; behavior/psychomotor activity, no tics, normal psychomotor activity; mood and affect, overall: normal mood and affect; appearance, overall: well-groomed, good eye contact; speech, overall: normal quality, no aphasia and normal quality, quantity, intact.        Diagnostic Test Results:  Results for orders placed or performed in visit on 11/12/18   Pain Drug Scr UR W Rptd Meds   Result Value Ref Range    Pain Drug SCR UR W RPTD Meds FINAL            ICD-10-CM    1. Acute bronchitis, unspecified organism J20.9 predniSONE (DELTASONE) 20 MG tablet     azithromycin (ZITHROMAX) 500 MG tablet     DISCONTINUED: azithromycin (ZITHROMAX) 500 MG tablet     DISCONTINUED: predniSONE (DELTASONE) 20 MG tablet   2. Degeneration of lumbar or lumbosacral intervertebral disc M51.37 HYDROcodone-acetaminophen (NORCO) 7.5-325 MG per tablet   3. Cervicalgia M54.2 HYDROcodone-acetaminophen (NORCO) 7.5-325 MG per tablet   4. Chronic pain syndrome G89.4 HYDROcodone-acetaminophen (NORCO) 7.5-325 MG per tablet              .    Side effects benefits and risks thoroughly discussed. .he may come in early if unimproved or getting worse          Please drink 2 glasses of water prior to meals and walk 15-30 minutes after meals        I spent 25 minutes with patient discussing the following issues   The primary encounter diagnosis was Acute bronchitis, unspecified organism. Diagnoses of Degeneration of  lumbar or lumbosacral intervertebral disc, Cervicalgia, and Chronic pain syndrome were also pertinent to this visit. over half of which involved counseling and coordination of care.      Patient Instructions        (J20.9) Acute bronchitis, unspecified organism  (primary encounter diagnosis)  Comment:    Plan: azithromycin (ZITHROMAX) 500 MG tablet,         predniSONE (DELTASONE) 20 MG tablet             (M51.37) Degeneration of lumbar or lumbosacral intervertebral disc  Comment:    Plan: HYDROcodone-acetaminophen (NORCO) 7.5-325 MG         per tablet             (M54.2) Cervicalgia  Comment:    Plan: HYDROcodone-acetaminophen (NORCO) 7.5-325 MG         per tablet             (G89.4) Chronic pain syndrome  Comment:    Plan: HYDROcodone-acetaminophen (NORCO) 7.5-325 MG         per tablet                                 ALL THE ABOVE PROBLEMS ARE STABLE AND MED CHANGES AS NOTED        Diet: Mediterranean weight loss diet        Exercise: Range of motion back and ankles and knees exercises Range of motion, balance, isometric, and strengthening exercises 30 repetitions twice daily of involved joints            .JEANNIE LO MD 9/5/2019 4:37 PM  September 5, 2019

## 2019-09-26 ENCOUNTER — TELEPHONE (OUTPATIENT)
Dept: FAMILY MEDICINE | Facility: CLINIC | Age: 64
End: 2019-09-26

## 2019-09-26 DIAGNOSIS — G89.4 CHRONIC PAIN SYNDROME: ICD-10-CM

## 2019-09-26 DIAGNOSIS — M51.379 DEGENERATION OF LUMBAR OR LUMBOSACRAL INTERVERTEBRAL DISC: ICD-10-CM

## 2019-09-26 DIAGNOSIS — M54.2 CERVICALGIA: ICD-10-CM

## 2019-09-26 RX ORDER — HYDROCODONE BITARTRATE AND ACETAMINOPHEN 7.5; 325 MG/1; MG/1
1 TABLET ORAL EVERY 4 HOURS PRN
Qty: 90 TABLET | Refills: 0 | Status: SHIPPED | OUTPATIENT
Start: 2019-10-05 | End: 2019-11-05

## 2019-09-26 NOTE — TELEPHONE ENCOUNTER
Pt called inquiring how often he needs to be seen for controlled medication. He scheduled an appt since he thought he was to schedule an appointment every month. Per chart review, OV needed every 3 months. Writer tried calling him to find which medication he needs to have refill. Pt was seen 09/05/2019 and doesn't need a new OV now unless he wants to be seen for new symptoms.

## 2019-09-26 NOTE — TELEPHONE ENCOUNTER
Pt was called he only need refill for [place] and plans to see PCP 12/2019 for a yearly exam.     Last OV 09/05/2019.    Controlled Substance Refill Request for Norco  Problem List Complete:  Yes  Patient is followed by JEANNIE LO MD for ongoing prescription of pain medication.  All refills should be approved by this provider, or covering partner.    Medication(s):  NORCO 7.5MG PO FOUR TIMES DAILY .   Maximum quantity per month:  120   Clinic visit frequency required: Q 3 months     Controlled substance agreement:  Encounter-Level CSA - 9/8/15:               Controlled Substance Agreement - Scan on 9/16/2015 11:59 AM : [facility], Controlled Substance Contract, 09-08-15 (below)            Pain Clinic evaluation in the past: No    DIRE Total Score(s):    11/2/2016   Total Score 17       Last [facility] website verification:  09/26/2019 no concerns   [email or web address]   checked in past 3 months?  Yes 09/26/2019.    HYDROcodone-acetaminophen (NORCO) 7.5-325 MG per tablet 90 tablet 0 9/5/2019  No   Sig - Route: Take 1 tablet by mouth every 4 hours as needed for pain maximum 3-4 tablet(s) per day - Oral     Please advice if ok to cancel 10/01/2019 appt.

## 2019-11-05 DIAGNOSIS — M51.379 DEGENERATION OF LUMBAR OR LUMBOSACRAL INTERVERTEBRAL DISC: ICD-10-CM

## 2019-11-05 DIAGNOSIS — G89.4 CHRONIC PAIN SYNDROME: ICD-10-CM

## 2019-11-05 DIAGNOSIS — M54.2 CERVICALGIA: ICD-10-CM

## 2019-11-05 RX ORDER — HYDROCODONE BITARTRATE AND ACETAMINOPHEN 7.5; 325 MG/1; MG/1
1 TABLET ORAL EVERY 4 HOURS PRN
Qty: 90 TABLET | Refills: 0 | Status: SHIPPED | OUTPATIENT
Start: 2019-11-05 | End: 2019-12-05

## 2019-11-05 NOTE — TELEPHONE ENCOUNTER
Requested Prescriptions   Pending Prescriptions Disp Refills     HYDROcodone-acetaminophen (NORCO) 7.5-325 MG per tablet 90 tablet 0     Sig: Take 1 tablet by mouth every 4 hours as needed for pain maximum 3-4 tablet(s) per day         Last Written Prescription Date:  10/5/19  Last Fill Quantity: 90 tab,   # refills: 0  Last Office Visit: 9/5/19 [place]  Future Office visit:    Next 5 appointments (look out 90 days)    Dec 09, 2019  1:00 PM CST  PHYSICAL with Addison Franks MD  [facility] ([facility]) [address] [place] [id number] [phone]           Routing refill request to provider for review/approval because:  Drug not on the [facility], P or [facility] refill protocol or controlled substance      There is no refill protocol information for this order

## 2019-11-05 NOTE — TELEPHONE ENCOUNTER
Controlled Substance Refill Request for Norco [id number]  Problem List Complete:  Yes  Patient is followed by JEANNIE LO MD for ongoing prescription of pain medication.  All refills should be approved by this provider, or covering partner.    Medication(s):  NORCO 7.5MG PO FOUR TIMES DAILY .   Maximum quantity per month:  120   Clinic visit frequency required: Q 3 months     Controlled substance agreement:  Encounter-Level CSA - 9/8/15:               Controlled Substance Agreement - Scan on 9/16/2015 11:59 AM : [facility], Controlled Substance Contract, 09-08-15 (below)            Pain Clinic evaluation in the past: No    DIRE Total Score(s):    11/2/2016   Total Score 17       Last [facility] website verification:  09/26/2019 no concerns   [email or web address]       checked in past 3 months?  Yes 9/26/2019, no concerns     Routing refill request to provider for review/approval because:  Drug not on the [facility] refill protocol

## 2019-11-15 DIAGNOSIS — G25.81 RESTLESS LEGS SYNDROME (RLS): ICD-10-CM

## 2019-11-15 DIAGNOSIS — J45.30 ASTHMA, CHRONIC, MILD PERSISTENT, UNCOMPLICATED: ICD-10-CM

## 2019-11-15 DIAGNOSIS — J41.0 SIMPLE CHRONIC BRONCHITIS (H): ICD-10-CM

## 2019-11-15 DIAGNOSIS — E78.5 HYPERLIPIDEMIA WITH TARGET LDL LESS THAN 130: Chronic | ICD-10-CM

## 2019-11-15 NOTE — TELEPHONE ENCOUNTER
"Requested Prescriptions   Pending Prescriptions Disp Refills     simvastatin (ZOCOR) 10 MG tablet [Pharmacy Med Name: SIMVASTATIN 10 MG TABLET 10 TAB]  Last Written Prescription Date:  6/25/2019  Last Fill Quantity: 90 tablet,  # refills: 1   Last office visit: 9/5/2019 with prescribing provider:  MANDIE Franks   Future Office Visit:   Next 5 appointments (look out 90 days)    Dec 09, 2019  1:00 PM CST  PHYSICAL with Addison Franks MD  [facility] ([facility]) [address] [place] [id number] [phone]          90 tablet 1     Sig: TAKE 1 TABLET (10 MG) BY MOUTH AT BEDTIME       Statins Protocol Failed - 11/15/2019  1:45 PM        Failed - LDL on file in past 12 months     Recent Labs   Lab Test 11/09/17  1400   LDL 93             Passed - No abnormal creatine kinase in past 12 months     No lab results found.             Passed - Recent (12 mo) or future (30 days) visit within the authorizing provider's specialty     Patient has had an office visit with the authorizing provider or a provider within the authorizing providers department within the previous 12 mos or has a future within next 30 days. See \"Patient Info\" tab in inbasket, or \"Choose Columns\" in Meds & Orders section of the refill encounter.              Passed - Medication is active on med list        Passed - Patient is age 18 or older        carbidopa-levodopa (SINEMET)  MG tablet [Pharmacy Med Name: CARBIDOPA-LEVODOPA  T  TAB]  Last Written Prescription Date:  9/27/2018  Last Fill Quantity: 90 tablet,  # refills: 3   Last office visit: 9/5/2019 with prescribing provider:  MANDIE Franks   Future Office Visit:   Next 5 appointments (look out 90 days)    Dec 09, 2019  1:00 PM CST  PHYSICAL with Addison Franks MD  [facility] ([facility]) [address] [place] [address]" "[address] [id number] [phone]          90 tablet 3     Sig: TAKE 1 TABLET BY MOUTH AT BEDTIME       Antiparkinson's Agents Protocol Passed - 11/15/2019  1:45 PM        Passed - Recent (12 mo) or future (30 days) visit within the authorizing provider's specialty     Patient has had an office visit with the authorizing provider or a provider within the authorizing providers department within the previous 12 mos or has a future within next 30 days. See \"Patient Info\" tab in inbasket, or \"Choose Columns\" in Meds & Orders section of the refill encounter.              Passed - Medication is active on med list        Passed - Patient is age 18 or older        fluticasone (FLONASE) 50 MCG/ACT nasal spray [Pharmacy Med Name: FLUTICASONE PROP 50 MCG SPR 50 BARRIE]  Last Written Prescription Date:  8/6/2018  Last Fill Quantity: 16 g,  # refills: 11   Last office visit: 9/5/2019 with prescribing provider:  MANDIE Franks   Future Office Visit:   Next 5 appointments (look out 90 days)    Dec 09, 2019  1:00 PM CST  PHYSICAL with Addison Franks MD  [facility] ([facility]) [address] [id number] [phone]          16 g 11     Sig: INSTILL 1 TO 2 SPRAYS IN EACH NOSTRIL DAILY.       Inhaled Steroids Protocol Passed - 11/15/2019  1:45 PM        Passed - Patient is age 12 or older        Passed - Asthma control assessment score within normal limits in last 6 months     Please review ACT score.   ACT Total Scores 5/8/2017 2/6/2018 8/8/2019   ACT TOTAL SCORE - - -   ASTHMA ER VISITS - - -   ASTHMA HOSPITALIZATIONS - - -   ACT TOTAL SCORE (Goal Greater than or Equal to 20) 25 23 25   In the past 12 months, how many times did you visit the emergency room for your asthma without being admitted to the hospital? 0 0 0   In the past 12 months, how many times were you hospitalized overnight because of your " "asthma? 0 0 0             Passed - Medication is active on med list        Passed - Recent (6 mo) or future (30 days) visit within the authorizing provider's specialty     Patient had office visit in the last 6 months or has a visit in the next 30 days with authorizing provider or within the authorizing provider's specialty.  See \"Patient Info\" tab in inbasket, or \"Choose Columns\" in Meds & Orders section of the refill encounter.               "

## 2019-11-18 ENCOUNTER — TELEPHONE (OUTPATIENT)
Dept: FAMILY MEDICINE | Facility: CLINIC | Age: 64
End: 2019-11-18

## 2019-11-18 NOTE — TELEPHONE ENCOUNTER
Our goal is to have forms completed within 72 hours, however some forms may require a visit or additional information.    What clinic location was the form placed at [facility] or [place].?     Who is the form from?   Where did the form come from? Faxed to clinic   The form was placed in the inbox of Addison Franks Jr MD      Please fax to [phone]  Phone number: [phone]    Additional comments: [facility] - home oxygen & medical equipment (CPAP supplies)    Call take on 11/18/2019 at 2:59 PM by Keyla Ellis

## 2019-11-19 RX ORDER — FLUTICASONE PROPIONATE 50 MCG
SPRAY, SUSPENSION (ML) NASAL
Qty: 16 G | Refills: 11 | Status: SHIPPED | OUTPATIENT
Start: 2019-11-19 | End: 2020-05-13

## 2019-11-19 RX ORDER — CARBIDOPA AND LEVODOPA 25; 100 MG/1; MG/1
TABLET ORAL
Qty: 90 TABLET | Refills: 3 | Status: SHIPPED | OUTPATIENT
Start: 2019-11-19 | End: 2020-07-27

## 2019-11-19 RX ORDER — SIMVASTATIN 10 MG
TABLET ORAL
Qty: 90 TABLET | Refills: 1 | Status: SHIPPED | OUTPATIENT
Start: 2019-11-19 | End: 2020-07-27

## 2019-11-19 NOTE — TELEPHONE ENCOUNTER
Routing refill request to provider for review/approval because:  Labs not current:  LDL is from 2017.    Filled per [facility] protocol-flonase & cardiopa    RENATA RosenN, RN  [employer] [facility]

## 2019-11-25 DIAGNOSIS — F41.8 MIXED ANXIETY DEPRESSIVE DISORDER: ICD-10-CM

## 2019-11-26 RX ORDER — HYDROXYZINE PAMOATE 25 MG/1
CAPSULE ORAL
Qty: 270 CAPSULE | Refills: 3 | Status: SHIPPED | OUTPATIENT
Start: 2019-11-26 | End: 2019-12-09

## 2019-11-26 NOTE — TELEPHONE ENCOUNTER
Patient Contact    Attempt # 1    Was call answered?  No.  Left message on voicemail with information to call the clinic back for a follow up question on a medication refill request    On call back: Med that was requested was discontinued 11/2018. Did he request this? Does he want it? OV needed?

## 2019-11-26 NOTE — TELEPHONE ENCOUNTER
"Requested Prescriptions   Pending Prescriptions Disp Refills     hydrOXYzine (VISTARIL) 25 MG capsule [Pharmacy Med Name: HYDROXYZINE PAMOATE 25 MG C 25 CAP]  DISCONTINUED  Last Written Prescription Date:  9/27/2018 END: 11/12/2018  Last Fill Quantity: 270 capsule,  # refills: 3   Last office visit: 9/5/2019 with prescribing provider:  MANDIE Franks   Future Office Visit:   Next 5 appointments (look out 90 days)    Dec 09, 2019  1:00 PM CST  PHYSICAL with Addison Franks MD  [facility] ([facility] [address] [address]  [phone]          270 capsule 3     Sig: TAKE 1 CAPSULE BY MOUTH THREE TIMES DAILY AS NEEDED FOR ITCHING.       Antihistamines Protocol Failed - 11/25/2019  7:00 PM        Failed - Medication is active on med list        Passed - Recent (12 mo) or future (30 days) visit within the authorizing provider's specialty     Patient has had an office visit with the authorizing provider or a provider within the authorizing providers department within the previous 12 mos or has a future within next 30 days. See \"Patient Info\" tab in inbasket, or \"Choose Columns\" in Meds & Orders section of the refill encounter.              Passed - Patient is age 3 or older     Apply age and/or weight-based dosing for peds patients age 3 and older.    Forward request to provider for patients under the age of 3.             "

## 2019-11-26 NOTE — TELEPHONE ENCOUNTER
Patient called back, he does take this prn for anxiety and itching. He does not take this every day. Patient is requesting a refill.     Routing refill request to provider for review/approval because:  Drug not active on patient's medication list

## 2019-12-05 DIAGNOSIS — G89.4 CHRONIC PAIN SYNDROME: ICD-10-CM

## 2019-12-05 DIAGNOSIS — M51.379 DEGENERATION OF LUMBAR OR LUMBOSACRAL INTERVERTEBRAL DISC: ICD-10-CM

## 2019-12-05 DIAGNOSIS — M54.2 CERVICALGIA: ICD-10-CM

## 2019-12-05 RX ORDER — HYDROCODONE BITARTRATE AND ACETAMINOPHEN 7.5; 325 MG/1; MG/1
1 TABLET ORAL EVERY 4 HOURS PRN
Qty: 30 TABLET | Refills: 0 | Status: SHIPPED | OUTPATIENT
Start: 2019-12-05 | End: 2019-12-09

## 2019-12-05 NOTE — TELEPHONE ENCOUNTER
Reason for Call:  Medication or medication refill:    Do you use a [facility]?  Name of the pharmacy and phone number for the current request:   [id number] - [place] at [facility]    Name of the medication requested: HYDROcodone-acetaminophen (NORCO) 7.5-325 MG per tablet       Other request: The patient called and stated that he would like this medication refilled. He stated that he will be out of medication by 12/7 so he will need this refilled as soon as possible.     Can we leave a detailed message on this number? YES    Phone number patient can be reached at: Cell number on file:    Telephone Information:   Mobile [phone]       Best Time: Any    Call taken on 12/5/2019 at 2:03 PM by Deandra Llanos

## 2019-12-05 NOTE — TELEPHONE ENCOUNTER
Routing refill request to provider for review/approval because:  Drug not on the [facility] refill protocol      checked in past 3 months?  Yes 09/26/2019      RX [place] ([place] reviewed:  reviewed last 09/26/2019 - no concerns     [place] profile:  [email or web address]

## 2019-12-05 NOTE — PATIENT INSTRUCTIONS
(M51.37) Degeneration of lumbar or lumbosacral intervertebral disc  Comment:    Plan: HYDROcodone-acetaminophen (NORCO) 7.5-325 MG         per tablet             (M54.2) Cervicalgia  Comment:    Plan: HYDROcodone-acetaminophen (NORCO) 7.5-325 MG         per tablet             (G89.4) Chronic pain syndrome  Comment:    Plan: HYDROcodone-acetaminophen (NORCO) 7.5-325 MG         per tablet          Needs office visit

## 2019-12-05 NOTE — TELEPHONE ENCOUNTER
Controlled Substance Refill Request for Norco  Problem List Complete:  Yes    Patient is followed by JEANNIE LO MD for ongoing prescription of pain medication.  All refills should be approved by this provider, or covering partner.    Medication(s):  NORCO 7.5MG PO FOUR TIMES DAILY .   Maximum quantity per month:  120   Clinic visit frequency required: Q 3 months     Controlled substance agreement:  Encounter-Level CSA - 9/8/15:               Controlled Substance Agreement - Scan on 9/16/2015 11:59 AM : [facility], Controlled Substance Contract, 09-08-15 (below)            Pain Clinic evaluation in the past: No    DIRE Total Score(s):    11/2/2016   Total Score 17       Last [place]MP website verification:  09/26/2019 no concerns   [email or web address]       checked in past 3 months?  Yes 09/26/2019     RX monitoring program ([place]) reviewed:  reviewed last 09/26/2019 - no concerns    [place]MP profile:  [email or web address]

## 2019-12-06 NOTE — TELEPHONE ENCOUNTER
Patient Contact    Called patient and left VM informing rx was processed. Informed to call back to schedule an OV with PCP.

## 2019-12-09 ENCOUNTER — OFFICE VISIT (OUTPATIENT)
Dept: FAMILY MEDICINE | Facility: CLINIC | Age: 64
End: 2019-12-09
Payer: COMMERCIAL

## 2019-12-09 VITALS
WEIGHT: 190 LBS | HEIGHT: 66 IN | BODY MASS INDEX: 30.53 KG/M2 | OXYGEN SATURATION: 96 % | HEART RATE: 101 BPM | DIASTOLIC BLOOD PRESSURE: 84 MMHG | RESPIRATION RATE: 16 BRPM | SYSTOLIC BLOOD PRESSURE: 120 MMHG

## 2019-12-09 DIAGNOSIS — M51.379 DEGENERATION OF LUMBAR OR LUMBOSACRAL INTERVERTEBRAL DISC: ICD-10-CM

## 2019-12-09 DIAGNOSIS — M54.2 CERVICALGIA: ICD-10-CM

## 2019-12-09 DIAGNOSIS — G89.4 CHRONIC PAIN SYNDROME: ICD-10-CM

## 2019-12-09 DIAGNOSIS — Z00.00 WELL ADULT EXAM: Primary | ICD-10-CM

## 2019-12-09 DIAGNOSIS — F41.8 MIXED ANXIETY DEPRESSIVE DISORDER: ICD-10-CM

## 2019-12-09 DIAGNOSIS — E78.5 HYPERLIPIDEMIA WITH TARGET LDL LESS THAN 130: Chronic | ICD-10-CM

## 2019-12-09 DIAGNOSIS — F33.1 MAJOR DEPRESSIVE DISORDER, RECURRENT EPISODE, MODERATE (H): ICD-10-CM

## 2019-12-09 PROCEDURE — 80048 BASIC METABOLIC PNL TOTAL CA: CPT | Performed by: FAMILY MEDICINE

## 2019-12-09 PROCEDURE — 80307 DRUG TEST PRSMV CHEM ANLYZR: CPT | Mod: 90 | Performed by: FAMILY MEDICINE

## 2019-12-09 PROCEDURE — 84460 ALANINE AMINO (ALT) (SGPT): CPT | Performed by: FAMILY MEDICINE

## 2019-12-09 PROCEDURE — 99396 PREV VISIT EST AGE 40-64: CPT | Performed by: FAMILY MEDICINE

## 2019-12-09 PROCEDURE — 36415 COLL VENOUS BLD VENIPUNCTURE: CPT | Performed by: FAMILY MEDICINE

## 2019-12-09 PROCEDURE — 99000 SPECIMEN HANDLING OFFICE-LAB: CPT | Performed by: FAMILY MEDICINE

## 2019-12-09 PROCEDURE — 80061 LIPID PANEL: CPT | Performed by: FAMILY MEDICINE

## 2019-12-09 RX ORDER — HYDROCODONE BITARTRATE AND ACETAMINOPHEN 7.5; 325 MG/1; MG/1
1 TABLET ORAL EVERY 4 HOURS PRN
Qty: 115 TABLET | Refills: 0 | Status: SHIPPED | OUTPATIENT
Start: 2019-12-12 | End: 2020-01-14

## 2019-12-09 RX ORDER — HYDROXYZINE PAMOATE 25 MG/1
50 CAPSULE ORAL 3 TIMES DAILY PRN
Qty: 90 CAPSULE | Refills: 3 | Status: SHIPPED | OUTPATIENT
Start: 2019-12-09 | End: 2020-07-27

## 2019-12-09 ASSESSMENT — MIFFLIN-ST. JEOR: SCORE: 1608.55

## 2019-12-09 ASSESSMENT — ACTIVITIES OF DAILY LIVING (ADL): CURRENT_FUNCTION: NO ASSISTANCE NEEDED

## 2019-12-09 NOTE — PATIENT INSTRUCTIONS
(M51.37) Degeneration of lumbar or lumbosacral intervertebral disc  Comment:    Plan: HYDROcodone-acetaminophen (NORCO) 7.5-325 MG         per tablet             (M54.2) Cervicalgia  Comment:    Plan: HYDROcodone-acetaminophen (NORCO) 7.5-325 MG         per tablet             (G89.4) Chronic pain syndrome  Comment:    Plan: HYDROcodone-acetaminophen (NORCO) 7.5-325 MG         per tablet  Addison Franks Jr., MD

## 2019-12-09 NOTE — PROGRESS NOTES
"SUBJECTIVE:   Claude D Feinstein is a 64 year old male who presents for Preventive Visit.    Are you in the first 12 months of your Medicare coverage?  No    Healthy Habits:     In general, how would you rate your overall health?  Good    Frequency of exercise:  2-3 days/week    Duration of exercise:  15-30 minutes    Do you usually eat at least 4 servings of fruit and vegetables a day, include whole grains    & fiber and avoid regularly eating high fat or \"junk\" foods?  Yes    Taking medications regularly:  Yes    Barriers to taking medications:  None    Medication side effects:  None    Ability to successfully perform activities of daily living:  No assistance needed    Home Safety:  No safety concerns identified    Hearing Impairment:  No hearing concerns    In the past 6 months, have you been bothered by leaking of urine?  No    In general, how would you rate your overall mental or emotional health?  Good      PHQ-2 Total Score: 0    Additional concerns today:  No    Do you feel safe in your environment? Yes    Have you ever done Advance Care Planning? (For example, a Health Directive, POLST, or a discussion with a medical provider or your loved ones about your wishes): Yes, advance care planning is on file.     Bilateral tuning fork testing      Hearing Assessment: normal  Fall risk  Fallen 2 or more times in the past year?: No  Any fall with injury in the past year?: No    Cognitive Screening   1) Repeat 3 items (Leader, Season, Table)    2) Clock draw: ABNORMAL hands set wrong  3) 3 item recall: Recalls 2 objects   Results: ABNORMAL clock, 1-2 items recalled: PROBABLE COGNITIVE IMPAIRMENT, **INFORM PROVIDER**    Mini-CogTM Copyright BRYAN Ramirez. Licensed by the author for use in [facility]; reprinted with permission (odalis@.[email or web address]). All rights reserved.      Do you have sleep apnea, excessive snoring or daytime drowsiness?: yes    Reviewed and updated as needed this visit by clinical staff  Tobacco  " Allergies  Meds  Med Hx  Surg Hx  Fam Hx  Soc Hx        Reviewed and updated as needed this visit by Provider        Social History     Tobacco Use     Smoking status: Current Every Day Smoker     Packs/day: 1.00     Types: Cigarettes     Last attempt to quit: 3/10/2015     Years since quittin.7     Smokeless tobacco: Never Used   Substance Use Topics     Alcohol use: Yes     If you drink alcohol do you typically have >3 drinks per day or >7 drinks per week? No    Alcohol Use 2017   Prescreen: >3 drinks/day or >7 drinks/week? The patient does not drink >3 drinks per day nor >7 drinks per week.   No flowsheet data found.        COPD Follow-Up    Overall, how are your COPD symptoms since your last clinic visit?  No change    How much fatigue or shortness of breath do you have when you are walking?  None    How much shortness of breath do you have when you are resting?  More than usual    How often do you cough? Sometimes    Have you noticed any change in your sputum/phlegm?  Yes- Slight increase    Have you experienced a recent fever? No    Please describe how far you can walk without stopping to rest:  Less than a mile    How many flights of stairs are you able to walk up without stopping?  2    Have you had any Emergency Room Visits, Urgent Care Visits, or Hospital Admissions because of your COPD since your last office visit?  No    History   Smoking Status     Current Every Day Smoker     Packs/day: 1.00     Types: Cigarettes     Last attempt to quit: 3/10/2015   Smokeless Tobacco     Never Used     Lab Results   Component Value Date    FEV1 2.59 2014    SNF5VNT 78 2014       Chronic Pain Follow-Up       Type / Location of Pain: Acid osteoarthritis involving both knees  Chronic ankle pain chronic low back pain bilateral knee pain  Some neck pain  Analgesia/pain control:       Recent changes:  same      Overall control: Tolerable with discomfort  Activity level/function:      Daily  activities:  Able to do light housework, cooking    Work:  Unable to work  Adverse effects:  No  Adherance    Taking medication as directed?  Yes    Participating in other treatments: yes  Risk Factors:    Sleep:  Good    Mood/anxiety:  controlled    Recent family or social stressors:  none noted    Other aggravating factors: none  PHQ-9 SCORE 5/7/2018 11/12/2018 5/9/2019   PHQ-9 Total Score - - -   PHQ-9 Total Score 0 0 0     RAKESH-7 SCORE 11/7/2016 2/6/2018 5/9/2019   Total Score 0 0 0     Encounter-Level CSA - 11/09/2017:    Controlled Substance Agreement - Scan on 11/21/2017  6:30 AM: CONTROLLED SUBSTANCE AGREEMENT     Encounter-Level CSA - 09/08/2015:    Controlled Substance Agreement - Scan on 9/16/2015 11:59 AM: [facility], Controlled Substance Contract, 09-08-15     Patient-Level CSA:    There are no patient-level csa.         Current providers sharing in care for this patient include:   Patient Care Team:  Addison Franks MD as PCP - General (Family Practice)  Addison Franks MD as Assigned PCP    The following health maintenance items are reviewed in Epic and correct as of today:  Health Maintenance   Topic Date Due     HIV SCREENING  08/02/1970     FIT  11/17/2017     MEDICARE ANNUAL WELLNESS VISIT  11/09/2018     ASTHMA ACTION PLAN  08/06/2019     INFLUENZA VACCINE (1) 09/01/2019     ADVANCE CARE PLANNING  10/23/2019     PHQ-9  11/09/2019     URINE DRUG SCREEN  11/12/2019     ASTHMA CONTROL TEST  02/08/2020     LIPID  08/01/2021     DTAP/TDAP/TD IMMUNIZATION (3 - Td) 09/07/2028     SPIROMETRY  Completed     HEPATITIS C SCREENING  Completed     COPD ACTION PLAN  Completed     DEPRESSION ACTION PLAN  Completed     PNEUMOCOCCAL IMMUNIZATION 19-64 MEDIUM RISK  Completed     ZOSTER IMMUNIZATION  Completed     IPV IMMUNIZATION  Aged Out     MENINGITIS IMMUNIZATION  Aged Out       Lab work is in process  Labs reviewed in EPIC  BP Readings from Last 3 Encounters:   12/09/19 120/84   09/05/19 128/76    19 104/64    Wt Readings from Last 3 Encounters:   19 86.2 kg (190 lb)   19 83 kg (183 lb)   19 83 kg (183 lb)                  Patient Active Problem List   Diagnosis     Degeneration of cervical intervertebral disc     Restless legs syndrome (RLS)     Pruritic disorder     Degeneration of lumbar or lumbosacral intervertebral disc     External hemorrhoids     Primary localized osteoarthrosis, lower leg     Illiterate     DALTON (obstructive sleep apnea)- mild (AHI 5.9)     Hyperlipidemia with target LDL less than 130     Major depression, recurrent (H)     Major depressive disorder, recurrent episode, moderate (H)     Neck pain     Major depression in partial remission (H)     Mixed anxiety depressive disorder     Advance Care Planning     Anxiety     Chronic pain syndrome     Other chronic pain     Midline low back pain with sciatica     Status post total bilateral knee replacement     Simple chronic bronchitis (H)     Psychophysiologic insomnia     Past Surgical History:   Procedure Laterality Date     ANKLE SURGERY  2007    synovectomy     CARPAL TUNNEL RELEASE RT/LT       FOOT SURGERY      L bunionectomy     HEAD & NECK SURGERY      plate in head     KNEE SURGERY      R Knee Arthroscopy     ORTHOPEDIC SURGERY      arm surgery laceration R forearm       Social History     Tobacco Use     Smoking status: Current Every Day Smoker     Packs/day: 1.00     Types: Cigarettes     Last attempt to quit: 3/10/2015     Years since quittin.7     Smokeless tobacco: Never Used   Substance Use Topics     Alcohol use: Yes     Family History   Problem Relation Age of Onset     Alzheimer Disease Mother          Current Outpatient Medications   Medication Sig Dispense Refill     albuterol (2.5 MG/3ML) 0.083% neb solution NEBULIZE AND INHALE 1 VIAL EVERY 6 HOURS AS NEEDED FOR SHORTNESS OF BREATH/DYSPNEA OR WHEEZING. 360 mL 7     carbidopa-levodopa (SINEMET)  MG tablet TAKE 1  TABLET BY MOUTH AT BEDTIME 90 tablet 3     Cholecalciferol (VITAMIN D) 2000 UNITS CAPS Take 1 capsule by mouth daily.       cyclobenzaprine (FLEXERIL) 5 MG tablet TAKE 1 TABLET BY MOUTH TWICE DAILY AS NEEDED FOR MUSCLE SPASMS. 60 tablet 3     fluticasone (FLONASE) 50 MCG/ACT nasal spray INSTILL 1 TO 2 SPRAYS IN EACH NOSTRIL DAILY. 16 g 11     [START ON 12/12/2019] HYDROcodone-acetaminophen (NORCO) 7.5-325 MG per tablet Take 1 tablet by mouth every 4 hours as needed for pain maximum 3-4 tablet(s) per day 115 tablet 0     hydrOXYzine (VISTARIL) 25 MG capsule Take 2 capsules (50 mg) by mouth 3 times daily as needed for itching 90 capsule 3     lidocaine (ASPERCREME W/LIDOCAINE) 4 % CREA 4% topical cream Apply topically once as needed for mild pain 120 g 11     Melatonin (MELATONIN TR) 10 MG TBCR Take 1 tablet by mouth every evening 30 tablet 11     mineral oil-white petrolatum (MINERIN, EUCERIN) CREA Apply topically two times daily 454 g 11     ORDER FOR DME autocpap 6-10 cm       predniSONE (DELTASONE) 20 MG tablet Take 1 tablet (20 mg) by mouth daily 5 tablet 0     Saw Palmetto, Serenoa repens, (SAW PALMETTO FRUIT PO) Take  by mouth.       SHINGRIX injection        simvastatin (ZOCOR) 10 MG tablet TAKE 1 TABLET (10 MG) BY MOUTH AT BEDTIME 90 tablet 1     Skin Protectants, Misc. (EUCERIN) cream Apply topically as needed for dry skin 454 g G     Tdap, tetanus-diptheria-acell pertussis, (BOOSTRIX) 5-2.5-18.5 LF-MCG/0.5 injection        triamcinolone (KENALOG) 0.1 % external cream APPLY SPARINGLY TO THE AFFECTED AREA THREE TIMES A DAY AS NEEDED. 30 g 0     VENTOLIN  (90 Base) MCG/ACT inhaler INHALE 2 PUFFS INTO THE LUNGS EVERY 6 HOURS AS NEEDED FOR SHORTNESS OF BREATH / DYSPNEA OR WHEEZING 18 g 11     No Known Allergies  Recent Labs   Lab Test 11/09/17  1400 08/01/16  1418 03/17/15  1333  06/11/13  1427 01/29/13  1211  05/25/12  1646   A1C  --  5.4  --   --  5.8  --   --   --    LDL 93 66  --   --  55 71   < >  "90.2   HDL  --  44  --   --  47 50  --  53   TRIG  --  163*  --   --  151* 121  --  189*   ALT  --  56  --   --   --  40  --  86.0*   CR  --  0.88 1.00  --  0.90 0.90  --  0.8   GFRESTIMATED  --  89 77  --  88 88   < >  --    GFRESTBLACK  --  >90 >90  --  >90 >90   < >  --    POTASSIUM  --  3.9 4.3   < > 4.1 4.0  --  4.3   TSH  --   --   --   --  2.73  --   --   --     < > = values in this interval not displayed.      Patient is already had flu shot this year    Review of Systems   has Degeneration of cervical intervertebral disc; Restless legs syndrome (RLS); Pruritic disorder; Degeneration of lumbar or lumbosacral intervertebral disc; External hemorrhoids; Primary localized osteoarthrosis, lower leg; Illiterate; DALTON (obstructive sleep apnea)- mild (AHI 5.9); Hyperlipidemia with target LDL less than 130; Major depression, recurrent (H); Major depressive disorder, recurrent episode, moderate (H); Neck pain; Major depression in partial remission (H); Mixed anxiety depressive disorder; Advance Care Planning; Anxiety; Chronic pain syndrome; Other chronic pain; Midline low back pain with sciatica; Status post total bilateral knee replacement; Simple chronic bronchitis (H); and Psychophysiologic insomnia on their problem list.  Review Of Systems  Skin: negative  Eyes: negative  Ears/Nose/Throat: negative  Respiratory: No shortness of breath, dyspnea on exertion, cough, or hemoptysis  Mild chronic bronchitis and obstructive sleep apnea  Cardiovascular: negative  Gastrointestinal: negative  Genitourinary: negative  Musculoskeletal: back pain, neck pain, arthritis, joint pain and joint stiffness  Neurologic: negative  Psychiatric: negative  Hematologic/Lymphatic/Immunologic: negative  Endocrine: negative        OBJECTIVE:   Resp 16   Ht 1.683 m (5' 6.25\")   Wt 86.2 kg (190 lb)   BMI 30.44 kg/m   Estimated body mass index is 30.44 kg/m  as calculated from the following:    Height as of this encounter: 1.683 m (5' " "6.25\").    Weight as of this encounter: 86.2 kg (190 lb).  Physical Exam  GENERAL: healthy, alert and no distress  EYES: Eyes grossly normal to inspection, PERRL and conjunctivae and sclerae normal  HENT: ear canals and TM's normal, nose and mouth without ulcers or lesions  NECK: no adenopathy, no asymmetry, masses, or scars and thyroid normal to palpation  RESP: lungs clear to auscultation - no rales, rhonchi or wheezes  BREAST: normal without masses, tenderness or nipple discharge and no palpable axillary masses or adenopathy  CV: regular rate and rhythm, normal S1 S2, no S3 or S4, no murmur, click or rub, no peripheral edema and peripheral pulses strong  ABDOMEN: soft, nontender, no hepatosplenomegaly, no masses and bowel sounds normal   (male): normal male genitalia without lesions or urethral discharge, no hernia  RECTAL: normal sphincter tone, no rectal masses, prostate normal size, smooth, nontender without nodules or masses  MS: no gross musculoskeletal defects noted, no edema  SKIN: no suspicious lesions or rashes  NEURO: Normal strength and tone, mentation intact and speech normal  PSYCH: mentation appears normal, affect normal/bright  LYMPH: no cervical, supraclavicular, axillary, or inguinal adenopathy  Diabetic foot exam: normal DP and PT pulses, no trophic changes or ulcerative lesions, normal sensory exam and normal monofilament exam    Diagnostic Test Results:  Labs reviewed in Epic  No results found for any visits on 12/09/19.    ASSESSMENT / PLAN:       ICD-10-CM    1. Well adult exam Z00.00 Basic metabolic panel   2. Degeneration of lumbar or lumbosacral intervertebral disc M51.37 HYDROcodone-acetaminophen (NORCO) 7.5-325 MG per tablet   3. Cervicalgia M54.2 HYDROcodone-acetaminophen (NORCO) 7.5-325 MG per tablet   4. Chronic pain syndrome G89.4 HYDROcodone-acetaminophen (NORCO) 7.5-325 MG per tablet     Pain Drug Scr UR W Rptd Meds   5. Mixed anxiety depressive disorder F41.8 hydrOXYzine " "(VISTARIL) 25 MG capsule   6. Hyperlipidemia with target LDL less than 130 E78.5 Lipid panel reflex to direct LDL Fasting     ALT   7. Major depressive disorder, recurrent episode, moderate (H) F33.1        COUNSELING:  Reviewed preventive health counseling, as reflected in patient instructions       Consider AAA screening for ages 65-75 and smoking history       Regular exercise       Healthy diet/nutrition       Vision screening       Hearing screening       Dental care       Bladder control       Fall risk prevention    Estimated body mass index is 30.44 kg/m  as calculated from the following:    Height as of this encounter: 1.683 m (5' 6.25\").    Weight as of this encounter: 86.2 kg (190 lb).    Weight management plan: Discussed healthy diet and exercise guidelines     reports that he has been smoking cigarettes. He has been smoking about 1.00 pack per day. He has never used smokeless tobacco.      Appropriate preventive services were discussed with this patient, including applicable screening as appropriate for cardiovascular disease, diabetes, osteopenia/osteoporosis, and glaucoma.  As appropriate for age/gender, discussed screening for colorectal cancer, prostate cancer, breast cancer, and cervical cancer. Checklist reviewing preventive services available has been given to the patient.    Reviewed patients plan of care and provided an AVS. The Basic Care Plan (routine screening as documented in Health Maintenance) for Claude meets the Care Plan requirement. This Care Plan has been established and reviewed with the Patient.    Counseling Resources:  ATP IV Guidelines  Pooled Cohorts Equation Calculator  Breast Cancer Risk Calculator  FRAX Risk Assessment  ICSI Preventive Guidelines  Dietary Guidelines for Americans, 2010  USDA's MyPlate  ASA Prophylaxis  Lung CA Screening    JEANNIE LO MD  [facility]    Identified Health Risks:  "

## 2019-12-09 NOTE — LETTER
December 13, 2019      Claude D [facility]  [address]  [place] [address]        Dear ,    We are writing to inform you of your test results.    NORMAL LIVER FUNCTION TEST   NORMAL LIPID PANEL   NORMAL GLUCOSE, RENAL AND BLOOD SALTS      Resulted Orders   Basic metabolic panel   Result Value Ref Range    Sodium 138 133 - 144 mmol/L    Potassium 3.5 3.4 - 5.3 mmol/L    Chloride 104 94 - 109 mmol/L    Carbon Dioxide 27 20 - 32 mmol/L    Anion Gap 7 3 - 14 mmol/L    Glucose 84 70 - 99 mg/dL    Urea Nitrogen 18 7 - 30 mg/dL    Creatinine 1.08 0.66 - 1.25 mg/dL    GFR Estimate 73 >60 mL/min/[1.73_m2]      Comment:      Non  GFR Calc  Starting 12/18/2018, serum creatinine based estimated GFR (eGFR) will be   calculated using the Chronic Kidney Disease Epidemiology Collaboration   (CKD-EPI) equation.      GFR Estimate If Black 85 >60 mL/min/[1.73_m2]      Comment:       GFR Calc  Starting 12/18/2018, serum creatinine based estimated GFR (eGFR) will be   calculated using the Chronic Kidney Disease Epidemiology Collaboration   (CKD-EPI) equation.      Calcium 9.2 8.5 - 10.1 mg/dL   Lipid panel reflex to direct LDL Fasting   Result Value Ref Range    Cholesterol 176 <200 mg/dL    Triglycerides 87 <150 mg/dL    HDL Cholesterol 97 >39 mg/dL    LDL Cholesterol Calculated 62 <100 mg/dL      Comment:      Desirable:       <100 mg/dl    Non HDL Cholesterol 79 <130 mg/dL   ALT   Result Value Ref Range    ALT 51 0 - 70 U/L       If you have any questions or concerns, please call the clinic at the number listed above.       Sincerely,        JEANNIE LO MD

## 2019-12-09 NOTE — LETTER
December 14, 2019      Claude ARIANA Counts include 234 beds at the [facility]  [address] [place] [address]        Dear ,    We are writing to inform you of your test results.  Normal pain urine drug screen  Normal liver function test  Normal lipid panel  Normal GLUCOSE, RENAL AND BLOOD SALTS        Resulted Orders   Basic metabolic panel   Result Value Ref Range    Sodium 138 133 - 144 mmol/L    Potassium 3.5 3.4 - 5.3 mmol/L    Chloride 104 94 - 109 mmol/L    Carbon Dioxide 27 20 - 32 mmol/L    Anion Gap 7 3 - 14 mmol/L    Glucose 84 70 - 99 mg/dL    Urea Nitrogen 18 7 - 30 mg/dL    Creatinine 1.08 0.66 - 1.25 mg/dL    GFR Estimate 73 >60 mL/min/[1.73_m2]      Comment:      Non  GFR Calc  Starting 12/18/2018, serum creatinine based estimated GFR (eGFR) will be   calculated using the Chronic Kidney Disease Epidemiology Collaboration   (CKD-EPI) equation.      GFR Estimate If Black 85 >60 mL/min/[1.73_m2]      Comment:       GFR Calc  Starting 12/18/2018, serum creatinine based estimated GFR (eGFR) will be   calculated using the Chronic Kidney Disease Epidemiology Collaboration   (CKD-EPI) equation.      Calcium 9.2 8.5 - 10.1 mg/dL   Lipid panel reflex to direct LDL Fasting   Result Value Ref Range    Cholesterol 176 <200 mg/dL    Triglycerides 87 <150 mg/dL    HDL Cholesterol 97 >39 mg/dL    LDL Cholesterol Calculated 62 <100 mg/dL      Comment:      Desirable:       <100 mg/dl    Non HDL Cholesterol 79 <130 mg/dL   ALT   Result Value Ref Range    ALT 51 0 - 70 U/L   Pain Drug Scr UR W Rptd Meds   Result Value Ref Range    Pain Drug SCR UR W RPTD Meds FINAL       Comment:      (Note)  ====================================================================  [facility] COMP DRUG ANALYSIS,UR  ====================================================================  Test                             Result       Flag       Units        Drug Present   Norhydrocodone                 274                      ng/mg creat    Norhydrocodone is an expected metabolite of hydrocodone.   Acetaminophen                  PRESENT                               Diphenhydramine                PRESENT                               Guaifenesin                    PRESENT                                Guaifenesin may be administered as an over-the-counter or    prescription drug; it may also be present as a breakdown product    of methocarbamol.  ====================================================================  Test                      Result    Flag   Units      Ref Range        Creatinine              62               mg/dL      >=20            ========================================  ============================  Declared Medications:  Medication list was not provided.  ====================================================================  For clinical consultation, please call [phone].  ====================================================================  Analysis performed by [employer] [place] [address]         If you have any questions or concerns, please call the clinic at the number listed above.       Sincerely,        JEANNIE LO MD

## 2019-12-10 LAB
ALT SERPL W P-5'-P-CCNC: 51 U/L (ref 0–70)
ANION GAP SERPL CALCULATED.3IONS-SCNC: 7 MMOL/L (ref 3–14)
BUN SERPL-MCNC: 18 MG/DL (ref 7–30)
CALCIUM SERPL-MCNC: 9.2 MG/DL (ref 8.5–10.1)
CHLORIDE SERPL-SCNC: 104 MMOL/L (ref 94–109)
CHOLEST SERPL-MCNC: 176 MG/DL
CO2 SERPL-SCNC: 27 MMOL/L (ref 20–32)
CREAT SERPL-MCNC: 1.08 MG/DL (ref 0.66–1.25)
GFR SERPL CREATININE-BSD FRML MDRD: 73 ML/MIN/{1.73_M2}
GLUCOSE SERPL-MCNC: 84 MG/DL (ref 70–99)
HDLC SERPL-MCNC: 97 MG/DL
LDLC SERPL CALC-MCNC: 62 MG/DL
NONHDLC SERPL-MCNC: 79 MG/DL
POTASSIUM SERPL-SCNC: 3.5 MMOL/L (ref 3.4–5.3)
SODIUM SERPL-SCNC: 138 MMOL/L (ref 133–144)
TRIGL SERPL-MCNC: 87 MG/DL

## 2019-12-13 LAB — PAIN DRUG SCR UR W RPTD MEDS: NORMAL

## 2020-01-13 DIAGNOSIS — M51.379 DEGENERATION OF LUMBAR OR LUMBOSACRAL INTERVERTEBRAL DISC: ICD-10-CM

## 2020-01-13 DIAGNOSIS — G89.4 CHRONIC PAIN SYNDROME: ICD-10-CM

## 2020-01-13 DIAGNOSIS — M54.2 CERVICALGIA: ICD-10-CM

## 2020-01-13 NOTE — TELEPHONE ENCOUNTER
Controlled Substance Refill Request for HYDROcodone-acetaminophen (NORCO) 7.5-325 MG per tablet  Problem List Complete:  Yes      Patient is followed by JEANNIE LO MD for ongoing prescription of pain medication.  All refills should be approved by this provider, or covering partner.     Medication(s):  NORCO 7.5MG PO FOUR TIMES DAILY .   Maximum quantity per month:  120   Clinic visit frequency required: Q 3 months      Controlled substance agreement:      Encounter-Level CSA - 9/8/15:                   Controlled Substance Agreement - Scan on 9/16/2015 11:59 AM : [place] Controlled Substance Contract, 09-08-15 (below)                Pain Clinic evaluation in the past: No     DIRE Total Score(s):    11/2/2016   Total Score 17         Last [facility] website verification:  09/26/2019 no concerns   [email or web address]        Last Written Prescription Date:  12/12/2019  Last Fill Quantity: 115 tablet,  # refills: 0   Last office visit: 12/9/2019 with prescribing provider:  MANDIE Lo   Future Office Visit:        Controlled substance agreement:   Encounter-Level CSA - 11/09/2017:    Controlled Substance Agreement - Scan on 11/21/2017  6:30 AM: CONTROLLED SUBSTANCE AGREEMENT     Encounter-Level CSA - 09/08/2015:    Controlled Substance Agreement - Scan on 9/16/2015 11:59 AM: [place] Controlled Substance Contract, 09-08-15     Patient-Level CSA:    There are no patient-level csa.         Last Urine Drug Screen:   Pain Drug SCR UR W RPTD Meds   Date Value Ref Range Status   12/09/2019 FINAL  Final     Comment:     (Note)  ====================================================================  [facility] COMP DRUG ANALYSIS,UR  ====================================================================  Test                             Result       Flag       Units        Drug Present   Norhydrocodone                 274                     ng/mg creat    Norhydrocodone is an expected metabolite of hydrocodone.   Acetaminophen                   PRESENT                               Diphenhydramine                PRESENT                               Guaifenesin                    PRESENT                                Guaifenesin may be administered as an over-the-counter or    prescription drug; it may also be present as a breakdown product    of methocarbamol.  ====================================================================  Test                      Result    Flag   Units      Ref Range        Creatinine              62               mg/dL      >=20            ====================================================================  Declared Medications:  Medication list was not provided.  ====================================================================  For clinical consultation, please call [phone].  ====================================================================  Analysis performed by [employer] [place] [address]     , No results found for: COMDAT, No results found for: THC13, PCP13, COC13, MAMP13, OPI13, AMP13, BZO13, TCA13, MTD13, BAR13, OXY13, PPX13, BUP13         [email or web address]   checked in past 3 months?  No, route to RN

## 2020-01-13 NOTE — TELEPHONE ENCOUNTER
Reason for Call:  Medication or medication refill:    Do you use a [facility]?  Name of the pharmacy and phone number for the current request:    [facility], [address], [place]    Name of the medication requested:   HYDROcodone-acetaminophen (NORCO)    Other request: none    Can we leave a detailed message on this number? YES    Phone number patient can be reached at: Cell number on file:    Telephone Information:   Mobile [phone]       Best Time: any    Call taken on 1/13/2020 at 1:05 PM by Alba Montano

## 2020-01-14 RX ORDER — HYDROCODONE BITARTRATE AND ACETAMINOPHEN 7.5; 325 MG/1; MG/1
1 TABLET ORAL EVERY 4 HOURS PRN
Qty: 110 TABLET | Refills: 0 | Status: SHIPPED | OUTPATIENT
Start: 2020-01-14 | End: 2020-02-13

## 2020-01-14 NOTE — TELEPHONE ENCOUNTER
Routing refill request to provider for review/approval because:  Drug not on the [facility] refill protocol     Last [facility] website verification:  01/14/20 no concerns   [email or web address]

## 2020-02-12 DIAGNOSIS — M54.2 CERVICALGIA: ICD-10-CM

## 2020-02-12 DIAGNOSIS — G89.4 CHRONIC PAIN SYNDROME: ICD-10-CM

## 2020-02-12 DIAGNOSIS — M51.379 DEGENERATION OF LUMBAR OR LUMBOSACRAL INTERVERTEBRAL DISC: ICD-10-CM

## 2020-02-12 NOTE — TELEPHONE ENCOUNTER
Controlled Substance Refill Request for HYDROcodone-acetaminophen (NORCO) 7.5-325 MG per tablet  Problem List Complete:  Yes      Patient is followed by JEANNIE LO MD for ongoing prescription of pain medication.  All refills should be approved by this provider, or covering partner.     Medication(s):  NORCO 7.5MG PO FOUR TIMES DAILY .   Maximum quantity per month:  120   Clinic visit frequency required: Q 3 months      Controlled substance agreement:      Encounter-Level CSA - 9/8/15:                   Controlled Substance Agreement - Scan on 9/16/2015 11:59 AM : [place] Controlled Substance Contract, 09-08-15 (below)                Pain Clinic evaluation in the past: No     DIRE Total Score(s):    11/2/2016   Total Score 17         Last [facility] website verification:  01/14/20 no concerns   [email or web address]        Last Written Prescription Date:  1/14/2020  Last Fill Quantity: 110 tablet,  # refills: 0   Last office visit: 12/9/2019 with prescribing provider:  MANDIE Lo   Future Office Visit:        Controlled substance agreement:   Encounter-Level CSA - 11/09/2017:    Controlled Substance Agreement - Scan on 11/21/2017  6:30 AM: CONTROLLED SUBSTANCE AGREEMENT     Encounter-Level CSA - 09/08/2015:    Controlled Substance Agreement - Scan on 9/16/2015 11:59 AM: [place] Controlled Substance Contract, 09-08-15     Patient-Level CSA:    There are no patient-level csa.         Last Urine Drug Screen:   Pain Drug SCR UR W RPTD Meds   Date Value Ref Range Status   12/09/2019 FINAL  Final     Comment:     (Note)  ====================================================================  [facility] COMP DRUG ANALYSIS,UR  ====================================================================  Test                             Result       Flag       Units        Drug Present   Norhydrocodone                 274                     ng/mg creat    Norhydrocodone is an expected metabolite of hydrocodone.   Acetaminophen                   PRESENT                               Diphenhydramine                PRESENT                               Guaifenesin                    PRESENT                                Guaifenesin may be administered as an over-the-counter or    prescription drug; it may also be present as a breakdown product    of methocarbamol.  ====================================================================  Test                      Result    Flag   Units      Ref Range        Creatinine              62               mg/dL      >=20            ====================================================================  Declared Medications:  Medication list was not provided.  ====================================================================  For clinical consultation, please call [phone].  ====================================================================  Analysis performed by [employer] [place] [address]     , No results found for: COMDAT, No results found for: THC13, PCP13, COC13, MAMP13, OPI13, AMP13, BZO13, TCA13, MTD13, BAR13, OXY13, PPX13, BUP13         [email or web address]   checked in past 3 months?  Yes 1/14/2020

## 2020-02-13 DIAGNOSIS — J41.0 SIMPLE CHRONIC BRONCHITIS (H): ICD-10-CM

## 2020-02-13 RX ORDER — HYDROCODONE BITARTRATE AND ACETAMINOPHEN 7.5; 325 MG/1; MG/1
TABLET ORAL
Qty: 110 TABLET | Refills: 0 | Status: SHIPPED | OUTPATIENT
Start: 2020-02-13 | End: 2020-03-09

## 2020-02-13 RX ORDER — ALBUTEROL SULFATE 90 UG/1
AEROSOL, METERED RESPIRATORY (INHALATION)
Qty: 18 G | Refills: 5 | Status: SHIPPED | OUTPATIENT
Start: 2020-02-13 | End: 2020-05-13

## 2020-02-13 NOTE — TELEPHONE ENCOUNTER
"Requested Prescriptions   Pending Prescriptions Disp Refills     VENTOLIN  (90 Base) MCG/ACT inhaler [Pharmacy Med Name: VENTOLIN HFA 90 MCG INHALER 108 (90 BAS AERO] 18 g 11     Sig: INHALE 2 PUFFS INTO THE LUNGS EVERY 6 HOURS AS NEEDED FOR SHORTNESS OF BREATH / DYSPNEA OR WHEEZING     Last Written Prescription Date:  1/24/2019  Last Fill Quantity: 18 g,  # refills: 11   Last office visit: 12/9/2019 with prescribing provider:  12/9/2019   Future Office Visit:            Asthma Maintenance Inhalers - Anticholinergics Failed - 2/13/2020  9:57 AM        Failed - Asthma control assessment score within normal limits in last 6 months     Please review ACT score.           Passed - Patient is age 12 years or older        Passed - Medication is active on med list        Passed - Recent (6 mo) or future (30 days) visit within the authorizing provider's specialty     Patient had office visit in the last 6 months or has a visit in the next 30 days with authorizing provider or within the authorizing provider's specialty.  See \"Patient Info\" tab in inbasket, or \"Choose Columns\" in Meds & Orders section of the refill encounter.              "

## 2020-02-13 NOTE — TELEPHONE ENCOUNTER
ACT Total Scores 5/8/2017 2/6/2018 8/8/2019   ACT TOTAL SCORE - - -   ASTHMA ER VISITS - - -   ASTHMA HOSPITALIZATIONS - - -   ACT TOTAL SCORE (Goal Greater than or Equal to 20) 25 23 25   In the past 12 months, how many times did you visit the emergency room for your asthma without being admitted to the hospital? 0 0 0   In the past 12 months, how many times were you hospitalized overnight because of your asthma? 0 0 0

## 2020-03-10 ENCOUNTER — OFFICE VISIT (OUTPATIENT)
Dept: FAMILY MEDICINE | Facility: CLINIC | Age: 65
End: 2020-03-10
Payer: COMMERCIAL

## 2020-03-10 VITALS
SYSTOLIC BLOOD PRESSURE: 136 MMHG | BODY MASS INDEX: 32.52 KG/M2 | RESPIRATION RATE: 16 BRPM | DIASTOLIC BLOOD PRESSURE: 88 MMHG | OXYGEN SATURATION: 97 % | HEART RATE: 99 BPM | TEMPERATURE: 97.3 F | WEIGHT: 203 LBS

## 2020-03-10 DIAGNOSIS — G47.33 OSA (OBSTRUCTIVE SLEEP APNEA): ICD-10-CM

## 2020-03-10 DIAGNOSIS — G25.81 RESTLESS LEGS SYNDROME (RLS): ICD-10-CM

## 2020-03-10 DIAGNOSIS — F33.1 MAJOR DEPRESSIVE DISORDER, RECURRENT EPISODE, MODERATE (H): ICD-10-CM

## 2020-03-10 DIAGNOSIS — J41.0 SIMPLE CHRONIC BRONCHITIS (H): ICD-10-CM

## 2020-03-10 DIAGNOSIS — Z96.653 STATUS POST TOTAL BILATERAL KNEE REPLACEMENT: ICD-10-CM

## 2020-03-10 DIAGNOSIS — M50.30 DEGENERATION OF CERVICAL INTERVERTEBRAL DISC: Primary | Chronic | ICD-10-CM

## 2020-03-10 DIAGNOSIS — G89.4 CHRONIC PAIN SYNDROME: ICD-10-CM

## 2020-03-10 PROCEDURE — 99214 OFFICE O/P EST MOD 30 MIN: CPT | Performed by: FAMILY MEDICINE

## 2020-03-10 RX ORDER — BUPROPION HYDROCHLORIDE 150 MG/1
150 TABLET, FILM COATED, EXTENDED RELEASE ORAL 2 TIMES DAILY
Qty: 30 TABLET | Refills: 3 | Status: SHIPPED | OUTPATIENT
Start: 2020-03-10 | End: 2020-10-16

## 2020-03-10 RX ORDER — IPRATROPIUM BROMIDE 21 UG/1
1 SPRAY, METERED NASAL 2 TIMES DAILY
Qty: 30 ML | Refills: 0 | Status: SHIPPED | OUTPATIENT
Start: 2020-03-10 | End: 2020-05-13

## 2020-03-10 RX ORDER — PREDNISONE 20 MG/1
20 TABLET ORAL DAILY
Qty: 7 TABLET | Refills: 0 | Status: SHIPPED | OUTPATIENT
Start: 2020-03-10 | End: 2021-12-20

## 2020-03-10 RX ORDER — AZITHROMYCIN 500 MG/1
500 TABLET, FILM COATED ORAL DAILY
Qty: 3 TABLET | Refills: 0 | Status: SHIPPED | OUTPATIENT
Start: 2020-03-10 | End: 2020-04-01

## 2020-03-10 RX ORDER — ALBUTEROL SULFATE 0.83 MG/ML
SOLUTION RESPIRATORY (INHALATION)
Qty: 360 ML | Refills: 7 | Status: SHIPPED | OUTPATIENT
Start: 2020-03-10 | End: 2021-03-24

## 2020-03-10 RX ORDER — AZITHROMYCIN 500 MG/1
500 TABLET, FILM COATED ORAL DAILY
Qty: 3 TABLET | Refills: 0 | Status: SHIPPED | OUTPATIENT
Start: 2020-03-10 | End: 2020-03-10

## 2020-03-10 ASSESSMENT — PATIENT HEALTH QUESTIONNAIRE - PHQ9: SUM OF ALL RESPONSES TO PHQ QUESTIONS 1-9: 5

## 2020-03-10 NOTE — PATIENT INSTRUCTIONS
(M50.30) Degeneration of cervical intervertebral disc  (primary encounter diagnosis)  Comment:    Plan:      (G47.33) DALTON (obstructive sleep apnea)- mild (AHI 5.9)  Comment:    Plan:      (G89.4) Chronic pain syndrome  Comment:    Plan:      (Z96.653) Status post total bilateral knee replacement  Comment:    Plan:      (J41.0) Simple chronic bronchitis (H)  Comment:    Plan: ipratropium (ATROVENT) 0.03 % nasal spray,         predniSONE (DELTASONE) 20 MG tablet,         azithromycin (ZITHROMAX) 500 MG tablet,         albuterol (PROVENTIL) (2.5 MG/3ML) 0.083% neb         solution, DISCONTINUED: azithromycin         (ZITHROMAX) 500 MG tablet  ONE DAILY X 3 DAYS              (G25.81) Restless legs syndrome (RLS)  Comment:    Plan:  KEEP ON SAME    (F33.1) Major depressive disorder, recurrent episode, moderate (H)  Comment:    Plan: buPROPion (ZYBAN) 150 MG 12 hr tablet  WAIT ON THIS UNTIL DONE WITH BRONCHITIS   JEANNIE LO JR., MD

## 2020-03-10 NOTE — PROGRESS NOTES
Subjective     Claude D Feinstein is a 64 year old male who presents to clinic today for the following health issues:    HPI   Chronic Pain Initial Visit    Where in your body do you have pain? Back pain into legs, neck  When did you first notice your pain? More than 5 years ago  How would you describe your pain? Aching and Miserable at times  How has your pain affected your ability to work? Unable to work  What makes your pain worse? activity  Which of these pain treatments have you tried? Activity or exercise, Pain Clinic and Physical Therapy  Have you had a significant life event? No  Do you have any symptoms of anxiety, such as panic attacks, periods of constant worry, or not being able to turn off your thoughts? No  How well are you sleeping? Fair  Previous medication treatments:  Opiates - none  Antidepressants - none   NSAIDs - 1  Muscle relaxants - none  Topicals - none  Antiepileptics - none    History of chemical dependency:  No  Social History     Tobacco Use     Smoking status: Current Every Day Smoker     Packs/day: 1.00     Types: Cigarettes     Last attempt to quit: 3/10/2015     Years since quittin.0     Smokeless tobacco: Never Used   Substance Use Topics     Alcohol use: Yes     Drug use: No     Comment: marijuana on occasion         DIRE SCORE 2016   DIRE Total Score 17     PHQ-9 SCORE 2018   PHQ-9 Total Score - - -   PHQ-9 Total Score 0 0 0     RAKESH-7 SCORE 2016   Total Score 0 0 0     No flowsheet data found.    How many servings of fruits and vegetables do you eat daily?  0-1    On average, how many sweetened beverages do you drink each day (Examples: soda, juice, sweet tea, etc.  Do NOT count diet or artificially sweetened beverages)?   1    How many days per week do you exercise enough to make your heart beat faster? 3 or less    How many minutes a day do you exercise enough to make your heart beat faster? 9 or less    How many days per  week do you miss taking your medication? 0    .JEANNIE LO MD .3/10/2020 1:51 PM .March 10, 2020        Claude D Feinstein is a 64 year old male who is who presents with lower back pain and knee pain     Slight improvement    Chronic ankle pain     Neck pain improved     Worse it gets 8    Usual on 3     Not thinking of going back to work     Onset :  Many years      Severity: moderate       Home treatments  Medications      Additional Symptoms: lower back pain knee pain and ankle pain      Course  Ongoing               There are no preventive care reminders to display for this patient.          .  Current Outpatient Medications   Medication Sig Dispense Refill     albuterol (PROVENTIL) (2.5 MG/3ML) 0.083% neb solution NEBULIZE AND INHALE 1 VIAL EVERY 6 HOURS AS NEEDED FOR SHORTNESS OF BREATH/DYSPNEA OR WHEEZING. 360 mL 7     azithromycin (ZITHROMAX) 500 MG tablet Take 1 tablet (500 mg) by mouth daily 3 tablet 0     buPROPion (ZYBAN) 150 MG 12 hr tablet Take 1 tablet (150 mg) by mouth 2 times daily 30 tablet 3     carbidopa-levodopa (SINEMET)  MG tablet TAKE 1 TABLET BY MOUTH AT BEDTIME 90 tablet 3     Cholecalciferol (VITAMIN D) 2000 UNITS CAPS Take 1 capsule by mouth daily.       cyclobenzaprine (FLEXERIL) 5 MG tablet TAKE 1 TABLET BY MOUTH TWICE DAILY AS NEEDED FOR MUSCLE SPASMS. 60 tablet 3     fluticasone (FLONASE) 50 MCG/ACT nasal spray INSTILL 1 TO 2 SPRAYS IN EACH NOSTRIL DAILY. 16 g 11     HYDROcodone-acetaminophen (NORCO) 7.5-325 MG per tablet TAKE 1 TABLET BY MOUTH EVERY 4 HOURS AS NEEDED FOR PAIN (MAXIMUM 3-4 TABLETS PER DAY) 105 tablet 0     hydrOXYzine (VISTARIL) 25 MG capsule Take 2 capsules (50 mg) by mouth 3 times daily as needed for itching 90 capsule 3     ipratropium (ATROVENT) 0.03 % nasal spray Spray 1 spray into both nostrils 2 times daily 30 mL 0     lidocaine (ASPERCREME W/LIDOCAINE) 4 % CREA 4% topical cream Apply topically once as needed for mild pain 120 g 11     Melatonin  (MELATONIN TR) 10 MG TBCR Take 1 tablet by mouth every evening 30 tablet 11     mineral oil-white petrolatum (MINERIN, EUCERIN) CREA Apply topically two times daily 454 g 11     ORDER FOR DME autocpap 6-10 cm       predniSONE (DELTASONE) 20 MG tablet Take 1 tablet (20 mg) by mouth daily 7 tablet 0     predniSONE (DELTASONE) 20 MG tablet Take 1 tablet (20 mg) by mouth daily 5 tablet 0     Saw Palmetto, Serenoa repens, (SAW PALMETTO FRUIT PO) Take  by mouth.       SHINGRIX injection        simvastatin (ZOCOR) 10 MG tablet TAKE 1 TABLET (10 MG) BY MOUTH AT BEDTIME 90 tablet 1     Skin Protectants, Misc. (EUCERIN) cream Apply topically as needed for dry skin 454 g G     Tdap, tetanus-diptheria-acell pertussis, (BOOSTRIX) 5-2.5-18.5 LF-MCG/0.5 injection        triamcinolone (KENALOG) 0.1 % external cream APPLY SPARINGLY TO THE AFFECTED AREA THREE TIMES A DAY AS NEEDED. 30 g 0     VENTOLIN  (90 Base) MCG/ACT inhaler INHALE 2 PUFFS INTO THE LUNGS EVERY 6 HOURS AS NEEDED FOR SHORTNESS OF BREATH / DYSPNEA OR WHEEZING 18 g 5            No Known Allergies      Immunization History   Administered Date(s) Administered     Influenza (H1N1) 2010     Influenza (IIV3) PF 09/15/2010, 2011, 10/16/2012, 2013     Influenza Vaccine IM > 6 months Valent IIV4 2014, 2018, 2019     MMR 2019     Pneumococcal 23 valent 2012     TDAP Vaccine (Boostrix) 2018     Tdap (Adacel,Boostrix) 2007     Zoster vaccine recombinant adjuvanted (SHINGRIX) 2018, 2019     Zoster vaccine, live 2017               reports current alcohol use.        reports no history of drug use.      family history includes Alzheimer Disease in his mother.      He indicated that his mother is . He indicated that his father is .         has a past surgical history that includes Foot surgery (); knee surgery (); Ankle surgery (2007); orthopedic surgery (); carpal  tunnel release rt/lt; and Head and neck surgery ().       reports previously being sexually active and has had partner(s) who are Female.    .  Pediatric History   Patient Parents     Not on file     Other Topics Concern     Parent/sibling w/ CABG, MI or angioplasty before 65F 55M? No   Social History Narrative     Not on file             reports that he has been smoking cigarettes. He has been smoking about 1.00 pack per day. He has never used smokeless tobacco.        Medical, social, surgical, and family histories reviewed.        Labs reviewed in EPIC  Patient Active Problem List   Diagnosis     Degeneration of cervical intervertebral disc     Restless legs syndrome (RLS)     Pruritic disorder     Degeneration of lumbar or lumbosacral intervertebral disc     External hemorrhoids     Primary localized osteoarthrosis, lower leg     Illiterate     DALTON (obstructive sleep apnea)- mild (AHI 5.9)     Hyperlipidemia with target LDL less than 130     Major depression, recurrent (H)     Major depressive disorder, recurrent episode, moderate (H)     Neck pain     Major depression in partial remission (H)     Mixed anxiety depressive disorder     Advance Care Planning     Anxiety     Chronic pain syndrome     Other chronic pain     Midline low back pain with sciatica     Status post total bilateral knee replacement     Simple chronic bronchitis (H)     Psychophysiologic insomnia       Past Surgical History:   Procedure Laterality Date     ANKLE SURGERY  2007    synovectomy     CARPAL TUNNEL RELEASE RT/LT       FOOT SURGERY      L bunionectomy     HEAD & NECK SURGERY      plate in head     KNEE SURGERY      R Knee Arthroscopy     ORTHOPEDIC SURGERY      arm surgery laceration R forearm         Social History     Tobacco Use     Smoking status: Current Every Day Smoker     Packs/day: 1.00     Types: Cigarettes     Last attempt to quit: 3/10/2015     Years since quittin.0     Smokeless tobacco:  Never Used   Substance Use Topics     Alcohol use: Yes       Family History   Problem Relation Age of Onset     Alzheimer Disease Mother              Current Outpatient Medications   Medication Sig Dispense Refill     albuterol (PROVENTIL) (2.5 MG/3ML) 0.083% neb solution NEBULIZE AND INHALE 1 VIAL EVERY 6 HOURS AS NEEDED FOR SHORTNESS OF BREATH/DYSPNEA OR WHEEZING. 360 mL 7     azithromycin (ZITHROMAX) 500 MG tablet Take 1 tablet (500 mg) by mouth daily 3 tablet 0     buPROPion (ZYBAN) 150 MG 12 hr tablet Take 1 tablet (150 mg) by mouth 2 times daily 30 tablet 3     carbidopa-levodopa (SINEMET)  MG tablet TAKE 1 TABLET BY MOUTH AT BEDTIME 90 tablet 3     Cholecalciferol (VITAMIN D) 2000 UNITS CAPS Take 1 capsule by mouth daily.       cyclobenzaprine (FLEXERIL) 5 MG tablet TAKE 1 TABLET BY MOUTH TWICE DAILY AS NEEDED FOR MUSCLE SPASMS. 60 tablet 3     fluticasone (FLONASE) 50 MCG/ACT nasal spray INSTILL 1 TO 2 SPRAYS IN EACH NOSTRIL DAILY. 16 g 11     HYDROcodone-acetaminophen (NORCO) 7.5-325 MG per tablet TAKE 1 TABLET BY MOUTH EVERY 4 HOURS AS NEEDED FOR PAIN (MAXIMUM 3-4 TABLETS PER DAY) 105 tablet 0     hydrOXYzine (VISTARIL) 25 MG capsule Take 2 capsules (50 mg) by mouth 3 times daily as needed for itching 90 capsule 3     ipratropium (ATROVENT) 0.03 % nasal spray Spray 1 spray into both nostrils 2 times daily 30 mL 0     lidocaine (ASPERCREME W/LIDOCAINE) 4 % CREA 4% topical cream Apply topically once as needed for mild pain 120 g 11     Melatonin (MELATONIN TR) 10 MG TBCR Take 1 tablet by mouth every evening 30 tablet 11     mineral oil-white petrolatum (MINERIN, EUCERIN) CREA Apply topically two times daily 454 g 11     ORDER FOR DME autocpap 6-10 cm       predniSONE (DELTASONE) 20 MG tablet Take 1 tablet (20 mg) by mouth daily 7 tablet 0     predniSONE (DELTASONE) 20 MG tablet Take 1 tablet (20 mg) by mouth daily 5 tablet 0     Saw Palmetto, Serenoa repens, (SAW PALMETTO FRUIT PO) Take  by mouth.        SHINGRIX injection        simvastatin (ZOCOR) 10 MG tablet TAKE 1 TABLET (10 MG) BY MOUTH AT BEDTIME 90 tablet 1     Skin Protectants, Misc. (EUCERIN) cream Apply topically as needed for dry skin 454 g G     Tdap, tetanus-diptheria-acell pertussis, (BOOSTRIX) 5-2.5-18.5 LF-MCG/0.5 injection        triamcinolone (KENALOG) 0.1 % external cream APPLY SPARINGLY TO THE AFFECTED AREA THREE TIMES A DAY AS NEEDED. 30 g 0     VENTOLIN  (90 Base) MCG/ACT inhaler INHALE 2 PUFFS INTO THE LUNGS EVERY 6 HOURS AS NEEDED FOR SHORTNESS OF BREATH / DYSPNEA OR WHEEZING 18 g 5         Recent Labs   Lab Test 12/09/19  1334 11/09/17  1400 08/01/16  1418  06/11/13  1427 01/29/13  1211   A1C  --   --  5.4  --  5.8  --    LDL 62 93 66  --  55 71   HDL 97  --  44  --  47 50   TRIG 87  --  163*  --  151* 121   ALT 51  --  56  --   --  40   CR 1.08  --  0.88   < > 0.90 0.90   GFRESTIMATED 73  --  89   < > 88 88   GFRESTBLACK 85  --  >90   < > >90 >90   POTASSIUM 3.5  --  3.9   < > 4.1 4.0   TSH  --   --   --   --  2.73  --     < > = values in this interval not displayed.            BP Readings from Last 6 Encounters:   03/10/20 136/88   12/09/19 120/84   09/05/19 128/76   08/08/19 104/64   05/09/19 120/76   02/04/19 126/76         Wt Readings from Last 3 Encounters:   03/10/20 92.1 kg (203 lb)   12/09/19 86.2 kg (190 lb)   09/05/19 83 kg (183 lb)               Positive symptoms or findings indicated by bold designation:         ROS: 10 point ROS neg other than the symptoms noted above in the HPI.except  has Degeneration of cervical intervertebral disc; Restless legs syndrome (RLS); Pruritic disorder; Degeneration of lumbar or lumbosacral intervertebral disc; External hemorrhoids; Primary localized osteoarthrosis, lower leg; Illiterate; DALTON (obstructive sleep apnea)- mild (AHI 5.9); Hyperlipidemia with target LDL less than 130; Major depression, recurrent (H); Major depressive disorder, recurrent episode, moderate (H); Neck pain;  Major depression in partial remission (H); Mixed anxiety depressive disorder; Advance Care Planning; Anxiety; Chronic pain syndrome; Other chronic pain; Midline low back pain with sciatica; Status post total bilateral knee replacement; Simple chronic bronchitis (H); and Psychophysiologic insomnia on their problem list.  Review Of Systems    Skin: negative    Eyes:  Glasses     NO GLAUCOMA OR CATARACTS     Ears/Nose/Throat: negative    Respiratory:  COUGH X ONE MONTH     INTERMITTENT     SMOKER 15 CIGARETTES PER DAY     Cardiovascular: negative    Gastrointestinal: negative    Genitourinary: negative    Musculoskeletal:  LOWER BACK PAIN   AND KNEE PAIN     Neurologic: negative    Psychiatric: negative    Hematologic/Lymphatic/Immunologic: negative    Endocrine: negative                PE:  /88   Pulse 99   Temp 97.3  F (36.3  C)   Resp 16   Wt 92.1 kg (203 lb)   SpO2 97%   BMI 32.52 kg/m   Body mass index is 32.52 kg/m .        Constitutional: general appearance, well nourished, well developed, in no acute distress, well developed, appears stated age, normal body habitus,  GAINED         Eyes:; The patient has normal eyelids sclerae and conjunctivae :          Ears/Nose/Throat: external ear, overall: normal appearance; external nose, overall: benign appearance, normal moujth gums and lips           Neck: thyroid, overall: normal size, normal consistency, nontender,          Respiratory:  palpation of chest, overall: normal excursion,    Clear to percussion and auscultation     NO Tachypnea    NORMAL  Color          Cardiovascular:  Good color with no peripheral edema    Regular sinus rhythm without murmur.  Physiologic heart sounds   Heart is unelarged    .     Chest/Breast: normal shape           Abdominal exam,  Liver and spleen are  unenlarged        Tenderness    Scars              Urogenital; no renal, flank or bladder  tenderness;          Lymphatic: neck nodes,     Other nodes          Musculoskeletal:  Brief ortho exam normal except:           Integument: inspection of skin, no rash, lesions; and, palpation, no induration, no tenderness.          Neurologic mental status, overall: alert and oriented; gait, no ataxia, no unsteadiness; coordination, no tremors; cranial nerves, overall: normal motor, overall: normal bulk, tone.          Psychiatric: orientation/consciousness, overall: oriented to person, place and time; behavior/psychomotor activity, no tics, normal psychomotor activity; mood and affect, overall: normal mood and affect; appearance, overall: well-groomed, good eye contact; speech, overall: normal quality, no aphasia and normal quality, quantity, intact.        Diagnostic Test Results:  No results found for any visits on 03/10/20.        ICD-10-CM    1. Degeneration of cervical intervertebral disc  M50.30    2. DALTON (obstructive sleep apnea)- mild (AHI 5.9)  G47.33    3. Chronic pain syndrome  G89.4    4. Status post total bilateral knee replacement  Z96.653    5. Simple chronic bronchitis (H)  J41.0 ipratropium (ATROVENT) 0.03 % nasal spray     predniSONE (DELTASONE) 20 MG tablet     azithromycin (ZITHROMAX) 500 MG tablet     albuterol (PROVENTIL) (2.5 MG/3ML) 0.083% neb solution     DISCONTINUED: azithromycin (ZITHROMAX) 500 MG tablet   6. Restless legs syndrome (RLS)  G25.81    7. Major depressive disorder, recurrent episode, moderate (H)  F33.1 buPROPion (ZYBAN) 150 MG 12 hr tablet              .    Side effects benefits and risks thoroughly discussed. .he may come in early if unimproved or getting worse          Please drink 2 glasses of water prior to meals and walk 15-30 minutes after meals        I spent  25 MINUTES SPENT  with patient discussing the following issues   The primary encounter diagnosis was Degeneration of cervical intervertebral disc. Diagnoses of DALTON (obstructive sleep apnea)- mild (AHI 5.9), Chronic pain syndrome, Status post total bilateral knee  replacement, Simple chronic bronchitis (H), Restless legs syndrome (RLS), and Major depressive disorder, recurrent episode, moderate (H) were also pertinent to this visit. over half of which involved counseling and coordination of care.      There are no Patient Instructions on file for this visit.        ALL THE ABOVE PROBLEMS ARE STABLE AND MED CHANGES AS NOTED        Diet:  MEDITERRANEAN DIET         Exercise:  LOWER BACK PAIN AND KNEE PAIN   Exercises Range of motion, balance, isometric, and strengthening exercises 30 repetitions twice daily of involved joints            .JEANNIE LO MD 3/10/2020 1:51 PM  March 10, 2020

## 2020-04-13 DIAGNOSIS — G89.4 CHRONIC PAIN SYNDROME: ICD-10-CM

## 2020-04-13 DIAGNOSIS — M51.379 DEGENERATION OF LUMBAR OR LUMBOSACRAL INTERVERTEBRAL DISC: ICD-10-CM

## 2020-04-13 DIAGNOSIS — M54.2 CERVICALGIA: ICD-10-CM

## 2020-04-13 NOTE — TELEPHONE ENCOUNTER
Reason for Call:  Medication or medication refill:    Do you use a [facility]?  Name of the pharmacy and phone number for the current request:  [facility]    Name of the medication requested: HYDROcodone-acetaminophen (NORCO) 7.5-325 MG per tablet    Other request:  Patient has two days left of this medication    Can we leave a detailed message on this number? YES    Phone number patient can be reached at: Home number on file [phone] (home)    Best Time: anytime    Call taken on 4/13/2020 at 12:21 PM by ALMA HUNT

## 2020-04-14 RX ORDER — HYDROCODONE BITARTRATE AND ACETAMINOPHEN 7.5; 325 MG/1; MG/1
1 TABLET ORAL EVERY 6 HOURS PRN
Qty: 90 TABLET | Refills: 0 | Status: SHIPPED | OUTPATIENT
Start: 2020-04-14 | End: 2020-05-13

## 2020-04-14 NOTE — TELEPHONE ENCOUNTER
Patient Contact    Called patient and relayed that rx was sent to pharmacy. No further action needed.

## 2020-04-14 NOTE — TELEPHONE ENCOUNTER
Recently filled 4/7/2020.    Controlled Substance Refill Request for   Requested Prescriptions   Pending Prescriptions Disp Refills     HYDROcodone-acetaminophen (NORCO) 7.5-325 MG per tablet  Last Written Prescription Date:  4/7/2020  Last Fill Quantity: 100,  # refills: 0   Last office visit: 3/10/2020 with prescribing provider:  MANDIE Lo   Future Office Visit:     100 tablet 0       There is no refill protocol information for this order          Problem List Complete:  Yes  Patient is followed by JEANNIE LO MD for ongoing prescription of pain medication.  All refills should be approved by this provider, or covering partner.    Medication(s):  NORCO 7.5MG PO FOUR TIMES DAILY .   Maximum quantity per month:  120   Clinic visit frequency required: Q 3 months     Controlled substance agreement:  Encounter-Level CSA - 9/8/15:               Controlled Substance Agreement - Scan on 9/16/2015 11:59 AM : [facility], Controlled Substance Contract, 09-08-15 (below)            Pain Clinic evaluation in the past: No    DIRE Total Score(s):    11/2/2016   Total Score 17       Last [place] website verification:  01/14/20 no concerns   [email or web address]       checked in past 3 months?  Yes 4/14/2020, no concerns   RX monitoring program ([place]) reviewed:  reviewed- no concerns    [place] profile:  [email or web address]  Routing refill request to provider for review/approval because:  Drug not on the [facility] refill protocol

## 2020-05-11 DIAGNOSIS — J41.0 SIMPLE CHRONIC BRONCHITIS (H): ICD-10-CM

## 2020-05-11 DIAGNOSIS — G89.4 CHRONIC PAIN SYNDROME: ICD-10-CM

## 2020-05-11 DIAGNOSIS — J45.30 ASTHMA, CHRONIC, MILD PERSISTENT, UNCOMPLICATED: ICD-10-CM

## 2020-05-11 DIAGNOSIS — M54.2 CERVICALGIA: ICD-10-CM

## 2020-05-11 DIAGNOSIS — M51.379 DEGENERATION OF LUMBAR OR LUMBOSACRAL INTERVERTEBRAL DISC: ICD-10-CM

## 2020-05-13 RX ORDER — FLUTICASONE PROPIONATE 50 MCG
SPRAY, SUSPENSION (ML) NASAL
Qty: 16 G | Refills: 8 | Status: SHIPPED | OUTPATIENT
Start: 2020-05-13 | End: 2020-07-27

## 2020-05-13 RX ORDER — FLUTICASONE PROPIONATE 50 MCG
SPRAY, SUSPENSION (ML) NASAL
Qty: 16 G | Refills: 11 | Status: CANCELLED | OUTPATIENT
Start: 2020-05-13

## 2020-05-13 RX ORDER — IPRATROPIUM BROMIDE 21 UG/1
1 SPRAY, METERED NASAL 2 TIMES DAILY
Qty: 30 ML | Refills: 2 | Status: SHIPPED | OUTPATIENT
Start: 2020-05-13 | End: 2020-07-27

## 2020-05-13 RX ORDER — ALBUTEROL SULFATE 90 UG/1
AEROSOL, METERED RESPIRATORY (INHALATION)
Qty: 18 G | Refills: 5 | Status: SHIPPED | OUTPATIENT
Start: 2020-05-13 | End: 2020-07-27

## 2020-05-13 RX ORDER — HYDROCODONE BITARTRATE AND ACETAMINOPHEN 7.5; 325 MG/1; MG/1
1 TABLET ORAL EVERY 6 HOURS PRN
Qty: 80 TABLET | Refills: 0 | Status: SHIPPED | OUTPATIENT
Start: 2020-05-13 | End: 2020-06-11

## 2020-05-13 NOTE — TELEPHONE ENCOUNTER
Last office visit 03/10/2010    HYDROcodone-acetaminophen (NORCO) 7.5-325 MG per tablet  90 tablet  0  4/14/2020          Controlled Substance Refill Request for Norco  Problem List Complete:  Yes  Patient is followed by JEANNIE LO MD for ongoing prescription of pain medication.  All refills should be approved by this provider, or covering partner.    Medication(s):  NORCO 7.5MG PO FOUR TIMES DAILY .   Maximum quantity per month:  120   Clinic visit frequency required: Q 3 months     Controlled substance agreement:  Encounter-Level CSA - 9/8/15:               Controlled Substance Agreement - Scan on 9/16/2015 11:59 AM : [facility], Controlled Substance Contract, 09-08-15 (below)            Pain Clinic evaluation in the past: No    DIRE Total Score(s):    11/2/2016   Total Score 17       Last [facility] website verification:  4/14/20. no concerns   [email or web address]     checked in past 3 months?  Yes 04/14/2020.

## 2020-05-14 ASSESSMENT — ASTHMA QUESTIONNAIRES: ACT_TOTALSCORE: 21

## 2020-06-10 DIAGNOSIS — M54.2 CERVICALGIA: ICD-10-CM

## 2020-06-10 DIAGNOSIS — G89.4 CHRONIC PAIN SYNDROME: ICD-10-CM

## 2020-06-10 DIAGNOSIS — M51.379 DEGENERATION OF LUMBAR OR LUMBOSACRAL INTERVERTEBRAL DISC: ICD-10-CM

## 2020-06-10 NOTE — TELEPHONE ENCOUNTER
Reason for Call:  Medication or medication refill:    Do you use a [facility]?  Name of the pharmacy and phone number for the current request:  [facility]    Name of the medication requested: HYDROcodone-acetaminophen     Other request:     Can we leave a detailed message on this number? YES    Phone number patient can be reached at: Home number on file [phone] (home)    Best Time:     Call taken on 6/10/2020 at 2:58 PM by LEANN DE LEON

## 2020-06-11 RX ORDER — HYDROCODONE BITARTRATE AND ACETAMINOPHEN 7.5; 325 MG/1; MG/1
1 TABLET ORAL EVERY 6 HOURS PRN
Qty: 80 TABLET | Refills: 0 | Status: SHIPPED | OUTPATIENT
Start: 2020-06-11 | End: 2020-07-07

## 2020-06-11 NOTE — TELEPHONE ENCOUNTER
Last office visit 03/10/2020. Please advice if virtual needed for Rx approval today.    HYDROcodone-acetaminophen (NORCO) 7.5-325 MG per tablet  80 tablet  0  5/13/2020          Controlled Substance Refill Request for Norco  Problem List Complete:  Yes  Patient is followed by JEANNIE LO MD for ongoing prescription of pain medication.  All refills should be approved by this provider, or covering partner.    Medication(s):  NORCO 7.5MG PO FOUR TIMES DAILY .   Maximum quantity per month:  120   Clinic visit frequency required: Q 3 months     Controlled substance agreement:  Encounter-Level CSA - 9/8/15:               Controlled Substance Agreement - Scan on 9/16/2015 11:59 AM : [facility], Controlled Substance Contract, 09-08-15 (below)            Pain Clinic evaluation in the past: No    DIRE Total Score(s):    11/2/2016   Total Score 17       Last [facility] website verification:  4/14/20. no concerns   [email or web address]     checked in past 3 months?  Yes 04/14/2020

## 2020-07-07 DIAGNOSIS — M51.379 DEGENERATION OF LUMBAR OR LUMBOSACRAL INTERVERTEBRAL DISC: ICD-10-CM

## 2020-07-07 DIAGNOSIS — M54.2 CERVICALGIA: ICD-10-CM

## 2020-07-07 DIAGNOSIS — G89.4 CHRONIC PAIN SYNDROME: ICD-10-CM

## 2020-07-07 RX ORDER — HYDROCODONE BITARTRATE AND ACETAMINOPHEN 7.5; 325 MG/1; MG/1
1 TABLET ORAL EVERY 6 HOURS PRN
Qty: 80 TABLET | Refills: 0 | Status: SHIPPED | OUTPATIENT
Start: 2020-07-07 | End: 2020-07-27

## 2020-07-07 NOTE — TELEPHONE ENCOUNTER
Patient Contact    Called patient and scheduled establishing care appt with Dr. Uriostegui for 40 min. Routing to provider - please advise if 20 minute appointment preferred? Also, okay to do establish care visit via video? Patient states he will not leave the house at this time.

## 2020-07-07 NOTE — TELEPHONE ENCOUNTER
Controlled Substance Refill Request for: HYDROcodone-acetaminophen (NORCO) 7.5-325 MG per tablet  Problem List Complete:  Yes    Patient is followed by JEANNIE LO MD for ongoing prescription of pain medication.  All refills should be approved by this provider, or covering partner.    Medication(s):  NORCO 7.5MG PO FOUR TIMES DAILY .   Maximum quantity per month:  120   Clinic visit frequency required: Q 3 months     Controlled substance agreement:  Encounter-Level CSA - 9/8/15:               Controlled Substance Agreement - Scan on 9/16/2015 11:59 AM : [facility], Controlled Substance Contract, 09-08-15 (below)            Pain Clinic evaluation in the past: No    DIRE Total Score(s):    11/2/2016   Total Score 17     Last [facility] website verification:  4/14/20. no concerns   [email or web address]     checked in past 3 months?  Yes 4/14/20

## 2020-07-07 NOTE — TELEPHONE ENCOUNTER
Reason for Call:  Medication or medication refill:    Do you use a [facility]?  Name of the pharmacy and phone number for the current request:  [facility], [place] - [address]      Name of the medication requested: HYDROcodone-acetaminophen (NORCO) 7.5-325 MG per tablet    Other request: The patient called and stated that he needs a refill for this medication.     Can we leave a detailed message on this number? YES    Phone number patient can be reached at: Home number on file [phone] (home)    Best Time: Any    Call taken on 7/7/2020 at 3:16 PM by Deandra Mccauley

## 2020-07-27 ENCOUNTER — VIRTUAL VISIT (OUTPATIENT)
Dept: FAMILY MEDICINE | Facility: CLINIC | Age: 65
End: 2020-07-27
Payer: COMMERCIAL

## 2020-07-27 DIAGNOSIS — G25.81 RESTLESS LEGS SYNDROME (RLS): ICD-10-CM

## 2020-07-27 DIAGNOSIS — F41.8 MIXED ANXIETY DEPRESSIVE DISORDER: ICD-10-CM

## 2020-07-27 DIAGNOSIS — M54.50 CHRONIC LEFT-SIDED LOW BACK PAIN WITHOUT SCIATICA: ICD-10-CM

## 2020-07-27 DIAGNOSIS — M51.379 DEGENERATION OF LUMBAR OR LUMBOSACRAL INTERVERTEBRAL DISC: ICD-10-CM

## 2020-07-27 DIAGNOSIS — J45.30 ASTHMA, CHRONIC, MILD PERSISTENT, UNCOMPLICATED: ICD-10-CM

## 2020-07-27 DIAGNOSIS — M54.2 CERVICALGIA: ICD-10-CM

## 2020-07-27 DIAGNOSIS — G89.4 CHRONIC PAIN SYNDROME: ICD-10-CM

## 2020-07-27 DIAGNOSIS — G89.29 CHRONIC LEFT-SIDED LOW BACK PAIN WITHOUT SCIATICA: ICD-10-CM

## 2020-07-27 DIAGNOSIS — E78.5 HYPERLIPIDEMIA WITH TARGET LDL LESS THAN 130: Chronic | ICD-10-CM

## 2020-07-27 DIAGNOSIS — J41.0 SIMPLE CHRONIC BRONCHITIS (H): ICD-10-CM

## 2020-07-27 PROCEDURE — 99214 OFFICE O/P EST MOD 30 MIN: CPT | Mod: 95 | Performed by: FAMILY MEDICINE

## 2020-07-27 RX ORDER — ALBUTEROL SULFATE 90 UG/1
AEROSOL, METERED RESPIRATORY (INHALATION)
Qty: 54 G | Refills: 0 | Status: SHIPPED | OUTPATIENT
Start: 2020-07-27 | End: 2021-07-16

## 2020-07-27 RX ORDER — FLUTICASONE PROPIONATE 50 MCG
SPRAY, SUSPENSION (ML) NASAL
Qty: 16 G | Refills: 8 | Status: SHIPPED | OUTPATIENT
Start: 2020-07-27 | End: 2021-08-16

## 2020-07-27 RX ORDER — SIMVASTATIN 10 MG
10 TABLET ORAL DAILY
Qty: 90 TABLET | Refills: 1 | Status: SHIPPED | OUTPATIENT
Start: 2020-07-27 | End: 2021-05-18

## 2020-07-27 RX ORDER — CARBIDOPA AND LEVODOPA 25; 100 MG/1; MG/1
1 TABLET ORAL AT BEDTIME
Qty: 90 TABLET | Refills: 3 | Status: SHIPPED | OUTPATIENT
Start: 2020-07-27 | End: 2021-08-16

## 2020-07-27 RX ORDER — ALBUTEROL SULFATE 90 UG/1
AEROSOL, METERED RESPIRATORY (INHALATION)
Qty: 18 G | Refills: 5 | Status: SHIPPED | OUTPATIENT
Start: 2020-07-27 | End: 2020-07-27

## 2020-07-27 RX ORDER — IPRATROPIUM BROMIDE 21 UG/1
1 SPRAY, METERED NASAL 2 TIMES DAILY
Qty: 30 ML | Refills: 2 | Status: SHIPPED | OUTPATIENT
Start: 2020-07-27 | End: 2021-12-20

## 2020-07-27 RX ORDER — HYDROCODONE BITARTRATE AND ACETAMINOPHEN 7.5; 325 MG/1; MG/1
1 TABLET ORAL EVERY 6 HOURS PRN
Qty: 80 TABLET | Refills: 0 | Status: SHIPPED | OUTPATIENT
Start: 2020-07-27 | End: 2020-09-14

## 2020-07-27 RX ORDER — HYDROXYZINE PAMOATE 25 MG/1
50 CAPSULE ORAL 3 TIMES DAILY PRN
Qty: 90 CAPSULE | Refills: 3 | Status: SHIPPED | OUTPATIENT
Start: 2020-07-27 | End: 2021-08-16

## 2020-07-27 RX ORDER — CYCLOBENZAPRINE HCL 5 MG
5 TABLET ORAL 2 TIMES DAILY PRN
Qty: 60 TABLET | Refills: 3 | Status: SHIPPED | OUTPATIENT
Start: 2020-07-27 | End: 2021-05-18

## 2020-07-27 NOTE — PROGRESS NOTES
"Claude D Feinstein is a 64 year old male who is being evaluated via a billable video visit.      The patient has been notified of following:     \"This video visit will be conducted via a call between you and your physician/provider. We have found that certain health care needs can be provided without the need for an in-person physical exam.  This service lets us provide the care you need with a video conversation.  If a prescription is necessary we can send it directly to your pharmacy.  If lab work is needed we can place an order for that and you can then stop by our lab to have the test done at a later time.    Video visits are billed at different rates depending on your insurance coverage.  Please reach out to your insurance provider with any questions.    If during the course of the call the physician/provider feels a video visit is not appropriate, you will not be charged for this service.\"    Patient has given verbal consent for Video visit? Yes  How would you like to obtain your AVS? MyChart  If you are dropped from the video visit, the video invite should be resent to: Other e-mail: [email or web address]  Will anyone else be joining your video visit? No    Subjective     Claude D Feinstein is a 64 year old male who presents today via video visit for the following health issues:    HPI    New Patient/Transfer of Care       Video Start Time: 2:21        Patient Active Problem List   Diagnosis     Degeneration of cervical intervertebral disc     Restless legs syndrome (RLS)     Pruritic disorder     Degeneration of lumbar or lumbosacral intervertebral disc     External hemorrhoids     Primary localized osteoarthrosis, lower leg     Illiterate     DALTON (obstructive sleep apnea)- mild (AHI 5.9)     Hyperlipidemia with target LDL less than 130     Major depression, recurrent (H)     Major depressive disorder, recurrent episode, moderate (H)     Neck pain     Major depression in partial remission (H)     Mixed anxiety depressive " disorder     Advance Care Planning     Anxiety     Chronic pain syndrome     Other chronic pain     Midline low back pain with sciatica     Status post total bilateral knee replacement     Simple chronic bronchitis (H)     Psychophysiologic insomnia     Past Surgical History:   Procedure Laterality Date     ANKLE SURGERY  2007    synovectomy     CARPAL TUNNEL RELEASE RT/LT       FOOT SURGERY      L bunionectomy     HEAD & NECK SURGERY      plate in head     KNEE SURGERY      R Knee Arthroscopy     ORTHOPEDIC SURGERY      arm surgery laceration R forearm       Social History     Tobacco Use     Smoking status: Current Every Day Smoker     Packs/day: 1.00     Types: Cigarettes     Last attempt to quit: 3/10/2015     Years since quittin.3     Smokeless tobacco: Never Used   Substance Use Topics     Alcohol use: Yes     Family History   Problem Relation Age of Onset     Alzheimer Disease Mother            Reviewed and updated as needed this visit by Provider         Review of Systems   Constitutional, HEENT, cardiovascular, pulmonary, gi and gu systems are negative, except as otherwise noted.      Objective             Physical Exam     GENERAL: Healthy, alert and no distress  EYES: Eyes grossly normal to inspection.  No discharge or erythema, or obvious scleral/conjunctival abnormalities.  RESP: No audible wheeze, cough, or visible cyanosis.  No visible retractions or increased work of breathing.    SKIN: Visible skin clear. No significant rash, abnormal pigmentation or lesions.  NEURO: Cranial nerves grossly intact.  Mentation and speech appropriate for age.  PSYCH: Mentation appears normal, affect normal/bright, judgement and insight intact, normal speech and appearance well-groomed.              Assessment & Plan       ICD-10-CM    1. Chronic pain syndrome  G89.4 HYDROcodone-acetaminophen (NORCO) 7.5-325 MG per tablet   2. Hyperlipidemia with target LDL less than 130  E78.5 simvastatin  "(ZOCOR) 10 MG tablet   3. Restless legs syndrome (RLS)  G25.81 carbidopa-levodopa (SINEMET)  MG tablet   4. Simple chronic bronchitis (H)  J41.0 albuterol (VENTOLIN HFA) 108 (90 Base) MCG/ACT inhaler     fluticasone (FLONASE) 50 MCG/ACT nasal spray     ipratropium (ATROVENT) 0.03 % nasal spray   5. Asthma, chronic, mild persistent, uncomplicated  J45.30 fluticasone (FLONASE) 50 MCG/ACT nasal spray   6. Chronic left-sided low back pain without sciatica  M54.5 cyclobenzaprine (FLEXERIL) 5 MG tablet    G89.29    7. Degeneration of lumbar or lumbosacral intervertebral disc  M51.37 HYDROcodone-acetaminophen (NORCO) 7.5-325 MG per tablet   8. Cervicalgia  M54.2 HYDROcodone-acetaminophen (NORCO) 7.5-325 MG per tablet   9. Mixed anxiety depressive disorder  F41.8 hydrOXYzine (VISTARIL) 25 MG capsule        Tobacco Cessation:   reports that he has been smoking cigarettes. He has been smoking about 1.00 pack per day. He has never used smokeless tobacco.        BMI:   Estimated body mass index is 32.52 kg/m  as calculated from the following:    Height as of 12/9/19: 1.683 m (5' 6.25\").    Weight as of 3/10/20: 92.1 kg (203 lb).           Patient Instructions   I changed all of the patient's prescriptions to [facility] on [address].  His prior pharmacy is only doing inpatient, hospice and long-term care.  He will follow-up in December for his minimally monthly follow-ups for his Norco.      No follow-ups on file.    Rick Uriostegui MD  [facility]      Video-Visit Details    Type of service:  Video Visit    Video End Time:2:40    Originating Location (pt. Location): Home    Distant Location (provider location):  [facility]     Platform used for Video Visit: Bee    No follow-ups on file.       Rick Uriostegui MD        "

## 2020-07-27 NOTE — PATIENT INSTRUCTIONS
I changed all of the patient's prescriptions to [facility] on [address].  His prior pharmacy is only doing inpatient, hospice and long-term care.  He will follow-up in December for his minimally monthly follow-ups for his Norco.

## 2020-09-12 DIAGNOSIS — M54.2 CERVICALGIA: ICD-10-CM

## 2020-09-12 DIAGNOSIS — M51.379 DEGENERATION OF LUMBAR OR LUMBOSACRAL INTERVERTEBRAL DISC: ICD-10-CM

## 2020-09-12 DIAGNOSIS — G89.4 CHRONIC PAIN SYNDROME: ICD-10-CM

## 2020-09-12 NOTE — TELEPHONE ENCOUNTER
Patient looking to have rx refilled.    HYDROcodone-acetaminophen (NORCO) 7.5-325 MG per tablet  80 tablet  0  7/27/2020   No    Sig - Route: Take 1 tablet by mouth every 6 hours as needed for severe pain - Oral    Sent to pharmacy as: HYDROcodone-Acetaminophen 7.5-325 MG Oral Tablet (NORCO)    Class: E-Prescribe    Earliest Fill Date: 7/27/2020    Order: [id number]    E-Prescribing Status: Receipt confirmed by pharmacy (7/27/2020  2:36 PM CDT)      RN advised of 3 business day for refills and unable to refill controlled substances over the weekend.   Patient stated understanding.   Patient uses the [facility] in [place], [place].     Patient states new PCP is Dr. Uriostegui.  RN will route to PCP for refill request.     Delia Doe RN 09/12/20 5:18 PM  [facility] Nurse Advisor

## 2020-09-14 RX ORDER — HYDROCODONE BITARTRATE AND ACETAMINOPHEN 7.5; 325 MG/1; MG/1
1 TABLET ORAL EVERY 6 HOURS PRN
Qty: 80 TABLET | Refills: 0 | Status: SHIPPED | OUTPATIENT
Start: 2020-09-14 | End: 2020-10-16

## 2020-09-14 NOTE — TELEPHONE ENCOUNTER
Controlled Substance Refill Request for: HYDROcodone-acetaminophen (NORCO) 7.5-325 MG per tablet  Problem List Complete:    No     PROVIDER TO CONSIDER COMPLETION OF PROBLEM LIST AND OVERVIEW/CONTROLLED SUBSTANCE AGREEMENT    Controlled substance agreement:   Encounter-Level CSA - 11/09/2017:    Controlled Substance Agreement - Scan on 11/21/2017  6:30 AM: CONTROLLED SUBSTANCE AGREEMENT     Encounter-Level CSA - 09/08/2015:    Controlled Substance Agreement - Scan on 9/16/2015 11:59 AM: [facility], Controlled Substance Contract, 09-08-15     Patient-Level CSA:    There are no patient-level csa.         Last Urine Drug Screen:   Pain Drug SCR UR W RPTD Meds   Date Value Ref Range Status   12/09/2019 FINAL  Final     Comment:     (Note)  ====================================================================  [facility] COMP DRUG ANALYSIS,UR  ====================================================================  Test                             Result       Flag       Units        Drug Present   Norhydrocodone                 274                     ng/mg creat    Norhydrocodone is an expected metabolite of hydrocodone.   Acetaminophen                  PRESENT                               Diphenhydramine                PRESENT                               Guaifenesin                    PRESENT                                Guaifenesin may be administered as an over-the-counter or    prescription drug; it may also be present as a breakdown product    of methocarbamol.  ====================================================================  Test                      Result    Flag   Units      Ref Range        Creatinine              62               mg/dL      >=20            ====================================================================  Declared Medications:  Medication list was not provided.  ====================================================================  For clinical consultation, please call [phone]  [phone].  ====================================================================  Analysis performed by [employer] [place] [address]     , No results found for: COMDAT, No results found for: THC13, PCP13, COC13, MAMP13, OPI13, AMP13, BZO13, TCA13, MTD13, BAR13, OXY13, PPX13, BUP13     Processing:  Rx to be electronically transmitted to pharmacy by provider      [email or web address]     checked in past 3 months?  Yes 09/14/20

## 2020-10-13 DIAGNOSIS — M54.2 CERVICALGIA: ICD-10-CM

## 2020-10-13 DIAGNOSIS — G89.4 CHRONIC PAIN SYNDROME: ICD-10-CM

## 2020-10-13 DIAGNOSIS — F33.1 MAJOR DEPRESSIVE DISORDER, RECURRENT EPISODE, MODERATE (H): ICD-10-CM

## 2020-10-13 DIAGNOSIS — M51.379 DEGENERATION OF LUMBAR OR LUMBOSACRAL INTERVERTEBRAL DISC: ICD-10-CM

## 2020-10-13 NOTE — TELEPHONE ENCOUNTER
Reason for Call:  Medication or medication refill:    Do you use a [facility]?  Name of the pharmacy and phone number for the current request:       [id number] - [facility], [address] AT [place] [address]      Name of the medication requested: HYDROcodone-acetaminophen (NORCO) 7.5-325 MG per tablet,     Other request: The patient called and stated that he needs a refill for these medications. He would like a refill of the buPROPion (ZYBAN) 150 MG 12 hr tablet to try to quit smoking again.     Can we leave a detailed message on this number? YES    Phone number patient can be reached at: Home number on file [phone] (home)    Best Time: Any    Call taken on 10/13/2020 at 3:21 PM by Deandra Mccauley

## 2020-10-14 NOTE — TELEPHONE ENCOUNTER
Routing refill request to provider for review/approval because:  SSRIs Protocol [id number]/13/2020 03:23 PM   PHQ-9 score less than 5 in past 6 months     Controlled Substance Refill Request for: HYDROcodone-acetaminophen (NORCO) 7.5-325 MG per tablet  Problem List Complete:  Yes    Patient is followed by JEANNIE LO MD for ongoing prescription of pain medication.  All refills should be approved by this provider, or covering partner.    Medication(s):  NORCO 7.5MG PO FOUR TIMES DAILY .   Maximum quantity per month:  120   Clinic visit frequency required: Q 3 months     Controlled substance agreement:  Encounter-Level CSA - 9/8/15:               Controlled Substance Agreement - Scan on 9/16/2015 11:59 AM : [facility], Controlled Substance Contract, 09-08-15 (below)            Pain Clinic evaluation in the past: No    DIRE Total Score(s):    11/2/2016   Total Score 17       Last [facility] website verification: 09/14/20 - no concerns   [email or web address]       checked in past 3 months?  Yes 9/14/20

## 2020-10-16 RX ORDER — HYDROCODONE BITARTRATE AND ACETAMINOPHEN 7.5; 325 MG/1; MG/1
1 TABLET ORAL EVERY 6 HOURS PRN
Qty: 80 TABLET | Refills: 0 | Status: SHIPPED | OUTPATIENT
Start: 2020-10-16 | End: 2020-11-12

## 2020-10-16 RX ORDER — BUPROPION HYDROCHLORIDE 150 MG/1
150 TABLET, FILM COATED, EXTENDED RELEASE ORAL 2 TIMES DAILY
Qty: 30 TABLET | Refills: 3 | Status: SHIPPED | OUTPATIENT
Start: 2020-10-16 | End: 2021-04-01

## 2020-11-12 DIAGNOSIS — M54.2 CERVICALGIA: ICD-10-CM

## 2020-11-12 DIAGNOSIS — M51.379 DEGENERATION OF LUMBAR OR LUMBOSACRAL INTERVERTEBRAL DISC: ICD-10-CM

## 2020-11-12 DIAGNOSIS — G89.4 CHRONIC PAIN SYNDROME: ICD-10-CM

## 2020-11-12 RX ORDER — HYDROCODONE BITARTRATE AND ACETAMINOPHEN 7.5; 325 MG/1; MG/1
1 TABLET ORAL EVERY 6 HOURS PRN
Qty: 80 TABLET | Refills: 0 | Status: SHIPPED | OUTPATIENT
Start: 2020-11-12 | End: 2020-12-14

## 2020-11-12 NOTE — TELEPHONE ENCOUNTER
Reason for Call:  Medication or medication refill:    Do you use a [facility]?  Name of the pharmacy and phone number for the current request:     [id number] - [address] AT [place] [address]    Name of the medication requested: HYDROcodone-acetaminophen (NORCO) 7.5-325 MG per tablet       Other request: The patient called and stated that he needs this medication filled.     Can we leave a detailed message on this number? YES    Phone number patient can be reached at: Cell number on file:    Telephone Information:   Mobile [phone]       Best Time: Any    Call taken on 11/12/2020 at 2:11 PM by Deandra Llanos

## 2020-12-11 ENCOUNTER — NURSE TRIAGE (OUTPATIENT)
Dept: NURSING | Facility: CLINIC | Age: 65
End: 2020-12-11

## 2020-12-11 NOTE — TELEPHONE ENCOUNTER
Called to request for Norco 7.5-325 mg tablets.  Ira Alvarado RN    Additional Information    Negative: Drug overdose and triager unable to answer question    Negative: Caller requesting information unrelated to medicine    Negative: Caller requesting a prescription for Strep throat and has a positive culture result    Negative: Rash while taking a medication or within 3 days of stopping it    Negative: Immunization reaction suspected    Negative: Asthma and having symptoms of asthma (cough, wheezing, etc.)    Negative: Breastfeeding questions about mother's medicines and diet    Negative: MORE THAN A DOUBLE DOSE of a prescription or over-the-counter (OTC) drug    Negative: DOUBLE DOSE (an extra dose or lesser amount) of over-the-counter (OTC) drug and any symptoms (e.g., dizziness, nausea, pain, sleepiness)    Negative: DOUBLE DOSE (an extra dose or lesser amount) of prescription drug and any symptoms (e.g., dizziness, nausea, pain, sleepiness)    Negative: Took another person's prescription drug    Negative: DOUBLE DOSE (an extra dose or lesser amount) of prescription drug and NO symptoms (Exception: a double dose of antibiotics)    Negative: Diabetes drug error or overdose (e.g., took wrong type of insulin or took extra dose)    Negative: Caller has medication question about med not prescribed by PCP and triager unable to answer question (e.g., compatibility with other med, storage)    Negative: Request for URGENT new prescription or refill of 'essential' medication (i.e., likelihood of harm to patient if not taken) and triager unable to fill per department policy    Negative: Prescription not at pharmacy and was prescribed today by PCP    Negative: Pharmacy calling with prescription questions and triager unable to answer question    Negative: Caller has urgent medication question about med that PCP prescribed and triager unable to answer question    Negative: Caller has NON-URGENT medication question about med  that PCP prescribed and triager unable to answer question    Caller requesting a NON-URGENT new prescription or refill and triager unable to refill per department policy     Norco    Protocols used: MEDICATION QUESTION CALL-A-OH

## 2020-12-14 ENCOUNTER — NURSE TRIAGE (OUTPATIENT)
Dept: NURSING | Facility: CLINIC | Age: 65
End: 2020-12-14

## 2020-12-14 DIAGNOSIS — M51.379 DEGENERATION OF LUMBAR OR LUMBOSACRAL INTERVERTEBRAL DISC: ICD-10-CM

## 2020-12-14 DIAGNOSIS — M54.2 CERVICALGIA: ICD-10-CM

## 2020-12-14 DIAGNOSIS — G89.4 CHRONIC PAIN SYNDROME: ICD-10-CM

## 2020-12-14 RX ORDER — HYDROCODONE BITARTRATE AND ACETAMINOPHEN 7.5; 325 MG/1; MG/1
1 TABLET ORAL EVERY 6 HOURS PRN
Qty: 80 TABLET | Refills: 0 | Status: SHIPPED | OUTPATIENT
Start: 2020-12-14 | End: 2021-01-14

## 2020-12-14 NOTE — TELEPHONE ENCOUNTER
Pt is calling in to get his Vicodin re-filled. Pt had to get a new doctor in this clinic because his doctor retired. Pt has an appointment with the new doctor on Wednesday, but will be out before his clinic appointment on Wednesday.    Please call Claude at [phone].    Arsalan Casiano RN on 12/14/2020 at 2:40 PM

## 2020-12-16 ENCOUNTER — OFFICE VISIT (OUTPATIENT)
Dept: INTERNAL MEDICINE | Facility: CLINIC | Age: 65
End: 2020-12-16
Payer: COMMERCIAL

## 2020-12-16 VITALS
SYSTOLIC BLOOD PRESSURE: 128 MMHG | TEMPERATURE: 98.2 F | OXYGEN SATURATION: 98 % | HEART RATE: 105 BPM | DIASTOLIC BLOOD PRESSURE: 82 MMHG | BODY MASS INDEX: 34.78 KG/M2 | HEIGHT: 66 IN | WEIGHT: 216.4 LBS

## 2020-12-16 DIAGNOSIS — G89.4 CHRONIC PAIN SYNDROME: ICD-10-CM

## 2020-12-16 DIAGNOSIS — M54.40 CHRONIC MIDLINE LOW BACK PAIN WITH SCIATICA, SCIATICA LATERALITY UNSPECIFIED: Chronic | ICD-10-CM

## 2020-12-16 DIAGNOSIS — M54.2 NECK PAIN: ICD-10-CM

## 2020-12-16 DIAGNOSIS — F33.41 RECURRENT MAJOR DEPRESSIVE DISORDER, IN PARTIAL REMISSION (H): ICD-10-CM

## 2020-12-16 DIAGNOSIS — G89.29 CHRONIC MIDLINE LOW BACK PAIN WITH SCIATICA, SCIATICA LATERALITY UNSPECIFIED: Chronic | ICD-10-CM

## 2020-12-16 DIAGNOSIS — F33.1 MAJOR DEPRESSIVE DISORDER, RECURRENT EPISODE, MODERATE (H): ICD-10-CM

## 2020-12-16 DIAGNOSIS — Z00.00 ENCOUNTER FOR WELLNESS EXAMINATION: Primary | ICD-10-CM

## 2020-12-16 DIAGNOSIS — J41.0 SIMPLE CHRONIC BRONCHITIS (H): ICD-10-CM

## 2020-12-16 DIAGNOSIS — Z00.00 ENCOUNTER FOR MEDICARE ANNUAL WELLNESS EXAM: ICD-10-CM

## 2020-12-16 DIAGNOSIS — Z12.5 SCREENING FOR PROSTATE CANCER: ICD-10-CM

## 2020-12-16 LAB
ALBUMIN SERPL-MCNC: 3.8 G/DL (ref 3.4–5)
ALBUMIN UR-MCNC: NEGATIVE MG/DL
ALP SERPL-CCNC: 51 U/L (ref 40–150)
ALT SERPL W P-5'-P-CCNC: 33 U/L (ref 0–70)
ANION GAP SERPL CALCULATED.3IONS-SCNC: 2 MMOL/L (ref 3–14)
APPEARANCE UR: CLEAR
AST SERPL W P-5'-P-CCNC: 28 U/L (ref 0–45)
BILIRUB SERPL-MCNC: 0.3 MG/DL (ref 0.2–1.3)
BILIRUB UR QL STRIP: NEGATIVE
BUN SERPL-MCNC: 17 MG/DL (ref 7–30)
CALCIUM SERPL-MCNC: 9.4 MG/DL (ref 8.5–10.1)
CHLORIDE SERPL-SCNC: 107 MMOL/L (ref 94–109)
CHOLEST SERPL-MCNC: 142 MG/DL
CO2 SERPL-SCNC: 29 MMOL/L (ref 20–32)
COLOR UR AUTO: YELLOW
CREAT SERPL-MCNC: 0.93 MG/DL (ref 0.66–1.25)
ERYTHROCYTE [DISTWIDTH] IN BLOOD BY AUTOMATED COUNT: 13 % (ref 10–15)
GFR SERPL CREATININE-BSD FRML MDRD: 87 ML/MIN/{1.73_M2}
GLUCOSE SERPL-MCNC: 96 MG/DL (ref 70–99)
GLUCOSE UR STRIP-MCNC: NEGATIVE MG/DL
HCT VFR BLD AUTO: 39.4 % (ref 40–53)
HDLC SERPL-MCNC: 86 MG/DL
HGB BLD-MCNC: 13 G/DL (ref 13.3–17.7)
HGB UR QL STRIP: ABNORMAL
KETONES UR STRIP-MCNC: NEGATIVE MG/DL
LDLC SERPL CALC-MCNC: 25 MG/DL
LEUKOCYTE ESTERASE UR QL STRIP: NEGATIVE
MCH RBC QN AUTO: 32.4 PG (ref 26.5–33)
MCHC RBC AUTO-ENTMCNC: 33 G/DL (ref 31.5–36.5)
MCV RBC AUTO: 98 FL (ref 78–100)
NITRATE UR QL: NEGATIVE
NONHDLC SERPL-MCNC: 56 MG/DL
PH UR STRIP: 6 PH (ref 5–7)
PLATELET # BLD AUTO: 227 10E9/L (ref 150–450)
POTASSIUM SERPL-SCNC: 4 MMOL/L (ref 3.4–5.3)
PROT SERPL-MCNC: 7.7 G/DL (ref 6.8–8.8)
PSA SERPL-ACNC: 1.45 UG/L (ref 0–4)
RBC # BLD AUTO: 4.01 10E12/L (ref 4.4–5.9)
RBC #/AREA URNS AUTO: NORMAL /HPF
SODIUM SERPL-SCNC: 138 MMOL/L (ref 133–144)
SOURCE: ABNORMAL
SP GR UR STRIP: 1.02 (ref 1–1.03)
TRIGL SERPL-MCNC: 154 MG/DL
UROBILINOGEN UR STRIP-ACNC: 0.2 EU/DL (ref 0.2–1)
WBC # BLD AUTO: 5.7 10E9/L (ref 4–11)
WBC #/AREA URNS AUTO: NORMAL /HPF

## 2020-12-16 PROCEDURE — 80053 COMPREHEN METABOLIC PANEL: CPT | Performed by: FAMILY MEDICINE

## 2020-12-16 PROCEDURE — G0439 PPPS, SUBSEQ VISIT: HCPCS | Performed by: FAMILY MEDICINE

## 2020-12-16 PROCEDURE — 99213 OFFICE O/P EST LOW 20 MIN: CPT | Mod: 25 | Performed by: FAMILY MEDICINE

## 2020-12-16 PROCEDURE — 81001 URINALYSIS AUTO W/SCOPE: CPT | Performed by: FAMILY MEDICINE

## 2020-12-16 PROCEDURE — 80061 LIPID PANEL: CPT | Performed by: FAMILY MEDICINE

## 2020-12-16 PROCEDURE — 85027 COMPLETE CBC AUTOMATED: CPT | Performed by: FAMILY MEDICINE

## 2020-12-16 PROCEDURE — 36415 COLL VENOUS BLD VENIPUNCTURE: CPT | Performed by: FAMILY MEDICINE

## 2020-12-16 PROCEDURE — G0103 PSA SCREENING: HCPCS | Performed by: FAMILY MEDICINE

## 2020-12-16 ASSESSMENT — ACTIVITIES OF DAILY LIVING (ADL): CURRENT_FUNCTION: NO ASSISTANCE NEEDED

## 2020-12-16 ASSESSMENT — PATIENT HEALTH QUESTIONNAIRE - PHQ9: SUM OF ALL RESPONSES TO PHQ QUESTIONS 1-9: 7

## 2020-12-16 ASSESSMENT — MIFFLIN-ST. JEOR: SCORE: 1723.3

## 2020-12-16 NOTE — PATIENT INSTRUCTIONS
Patient Education   Personalized Prevention Plan  You are due for the preventive services outlined below.  Your care team is available to assist you in scheduling these services.  If you have already completed any of these items, please share that information with your care team to update in your medical record.  Health Maintenance Due   Topic Date Due     HIV Screening  08/02/1970     URINE DRUG SCREEN  12/09/2020       Exercise for a Healthier Heart     Exercise with a friend. When activity is fun, you're more likely to stick with it.   You may wonder how you can improve the health of your heart. If you re thinking about exercise, you re on the right track. You don t need to become an athlete. But you do need a certain amount of brisk exercise to help strengthen your heart. If you have been diagnosed with a heart condition, your healthcare provider may advise exercise to help stabilize your condition. To help make exercise a habit, choose safe, fun activities.   Before you start  Check with your healthcare provider before starting an exercise program. This is especially important if you have not been active for a while. It's also important if you have a long-term (chronic) health problem such as heart disease, diabetes, or obesity. Or if you are at high risk for having these problems.   Why exercise?  Exercising regularly offers many healthy rewards. It can help you do all of the following:    Improve your blood cholesterol level to help prevent further heart trouble    Lower your blood pressure to help prevent a stroke or heart attack    Control diabetes, or reduce your risk of getting this disease    Improve your heart and lung function    Reach and stay at a healthy weight    Make your muscles stronger so you can stay active    Prevent falls and fractures by slowing the loss of bone mass (osteoporosis)    Manage stress better    Reduce your blood pressure    Improve your sense of self and your body  image  Exercise tips      Ease into your routine. Set small goals. Then build on them. If you are not sure what your activity level should be, talk with your healthcare provider first before starting an exercise routine.    Exercise on most days. Aim for a total of 150 minutes (2 hours and 30 minutes) or more of moderate-intensity aerobic activity each week. Or 75 minutes (1 hour and 15 minutes) or more of vigorous-intensity aerobic activity each week. Or try for a combination of both. Moderate activity means that you breathe heavier and your heart rate increases but you can still talk. Think about doing 40 minutes of moderate exercise, 3 to 4 times a week. For best results, activity should last for about 40 minutes to lower blood pressure and cholesterol. It's OK to work up to the 40-minute period over time. Examples of moderate-intensity activity are walking 1 mile in 15 minutes. Or doing 30 to 45 minutes of yard work.    Step up your daily activity level.  Along with your exercise program, try being more active the whole day. Walk instead of drive. Or park further away so that you take more steps each day. Do more household tasks or yard work. You may not be able to meet the advised mount of physical activity. But doing some moderate- or vigorous-intensity aerobic activity can help reduce your risk for heart disease. Your healthcare provider can help you figure out what is best for you.    Choose 1 or more activities you enjoy.  Walking is one of the easiest things you can do. You can also try swimming, riding a bike, dancing, or taking an exercise class.    When to call your healthcare provider  Call your healthcare provider if you have any of these:     Chest pain or feel dizzy or lightheaded    Burning, tightness, pressure, or heaviness in your chest, neck, shoulders, back, or arms    Abnormal shortness of breath    More joint or muscle pain    A very fast or irregular heartbeat (palpitations)  StayWell last  reviewed this educational content on 7/1/2019 2000-2020 The [employer], [employer]. [address] [place] [address], [place] [address]. All rights reserved. This information is not intended as a substitute for professional medical care. Always follow your healthcare professional's instructions.          Understanding USDA MyPlate  The USDA (U.S. Department of Agriculture) has guidelines to help you make healthy food choices. These are called MyPlate. MyPlate shows the food groups that make up healthy meals using the image of a place setting. Before you eat, think about the healthiest choices for what to put onto your plate or into your cup or bowl. To learn more about building a healthy plate, visit www.choosemyplate.gov.    The food groups    Fruits. Any fruit or 100% fruit juice counts as part of the Fruit Group. Fruits may be fresh, canned, frozen, or dried, and may be whole, cut-up, or pureed. Make half your plate fruits and vegetables.    Vegetables. Any vegetable or 100% vegetable juice counts as a member of the Vegetable Group. Vegetables may be fresh, frozen, canned, or dried. They can be served raw or cooked and may be whole, cut-up, or mashed. Make half your plate fruits and vegetables.    Grains. All foods made from grains are part of the Grains Group. These include wheat, rice, oats, cornmeal, and barley such as bread, pasta, oatmeal, cereal, tortillas, and grits. Grains should be no more than a quarter of your plate. At least half of your grains should be whole grains.    Protein. This group includes meat, poultry, seafood, beans and peas, eggs, processed soy products (like tofu), nuts (including nut butters), and seeds. Make protein choices no more than a quarter of your plate. Meat and poultry choices should be lean or low fat.    Dairy. All fluid milk products and foods made from milk that contain calcium, like yogurt and cheese, are part of the Dairy Group. (Foods that have little calcium, such as cream,  butter, and cream cheese, are not part of the group.) Most dairy choices should be low-fat or fat-free.    Oils. These are fats that are liquid at room temperature. They include canola, corn, olive, soybean, and sunflower oil. Foods that are mainly oil include mayonnaise, certain salad dressings, and soft margarines. You should have only 5 to 7 teaspoons of oils a day. You probably already get this much from the food you eat.  [employer] last reviewed this educational content on 8/1/2017 2000-2020 The [employer]. [address] [place] [address]. All rights reserved. This information is not intended as a substitute for professional medical care. Always follow your healthcare professional's instructions.          Depression and Suicide in Older Adults    Nearly 2 million older Americans have some type of depression. Sadly, some of them even take their own lives. Yet depression among older adults is often ignored. Learn the warning signs. You may help spare a loved one needless pain. You may also save a life.  What is depression?  Depression is a serious illness that affects the way you think and feel. It is not a normal part of aging, nor is it a sign of weakness, a character flaw, or something you can snap out of. Most people with depression need treatment to get better. The most common symptom is a feeling of deep sadness. People who are depressed also may seem tired and listless. And nothing seems to give them pleasure. It s normal to grieve or be sad sometimes. But sadness lessens or passes with time. Depression rarely goes away or improves on its own. Other symptoms of depression are:    Sleeping more or less than normal    Eating more or less than normal    Having headaches, stomachaches, or other pains that don t go away    Feeling nervous,  empty,  or worthless    Crying a great deal    Thinking or talking about suicide or death    Feeling confused or forgetful  What causes it?  The  causes of depression aren t fully known. But, it is thought to result from a complex interaction of biochemistry, genetics, environmental factors, and personality. Certain chemicals in the brain play a role. Depression does run in families. And life stresses can also trigger depression in some people. Older adults often face many stressors, such as death of friends or a spouse, health problems, and financial concerns.  How you can help  Often, depressed people may not want to ask for help. When they do, they may be ignored. Or, they may receive the wrong treatment. You can help by showing parents and older friends love and support. If they seem depressed, help them find the right treatment. Talk to your doctor. Or contact a local mental health center, social service agency, or hospital. With modern treatment, no one has to suffer from depression.  If your older friend or family member agrees, you can be an advocate for him or her in the healthcare setting. Many times, older adults have other chronic illnesses such as diabetes, heart disease, or cancer that can cause symptoms of depression. Medicine side effects can also contribute to certain behaviors and feelings. It is important that the older adult's healthcare provider listens and sorts out the causes of any symptoms of depression and makes referrals to mental health specialists when needed. Untreated depression can result in misdiagnosis, including brain disorders such as dementia and Alzheimer's. If the health professional does not take the issue of depression seriously, ask your family member or friend to consider finding another provider.  Resources    National Suicide Prevention Lifeline (crisis hotline)126-367-NDHM (8750)    National [place] of Mental Fyzbyg150-416-2999pez.[facility].nih.gov    National [place] on Mental Grhofvq243-093-5534kto.marlon.org    Mental Health Eovclqp708-319-2304xmf.[facility].org    National Suicide Trhajxi829-027-6649 (800-SUICIDE)    Kathryn last reviewed this educational content on 2/1/2017 2000-2020 The [employer], [employer]. [address] [place] [address], [place] [address]. All rights reserved. This information is not intended as a substitute for professional medical care. Always follow your healthcare professional's instructions.

## 2020-12-16 NOTE — PROGRESS NOTES
"SUBJECTIVE:   CC: Claude D Feinstein is an 65 year old male who presents for preventative health visit.       Patient has been advised of split billing requirements and indicates understanding: Yes  Healthy Habits:    In general, how would you rate your overall health?  Good    Frequency of exercise:  1 day/week    Duration of exercise:  Less than 15 minutes    Do you usually eat at least 4 servings of fruit and vegetables a day, include whole grains    & fiber and avoid regularly eating high fat or \"junk\" foods?  No    Taking medications regularly:  Yes    Barriers to taking medications:  None    Medication side effects:  None    Ability to successfully perform activities of daily living:  No assistance needed    Home Safety:  No safety concerns identified    Hearing Impairment:  No hearing concerns    In the past 6 months, have you been bothered by leaking of urine?  No    In general, how would you rate your overall mental or emotional health?  Good      PHQ-2 Total Score:    Additional concerns today:  No          COPD Follow-Up    Overall, how are your COPD symptoms since your last clinic visit?  Better    How much fatigue or shortness of breath do you have when you are walking?  Same as usual    How much shortness of breath do you have when you are resting?  Same as usual    How often do you cough? Sometimes    Have you noticed any change in your sputum/phlegm?  No    Have you experienced a recent fever? No    Please describe how far you can walk without stopping to rest:  Less than 1 block    How many flights of stairs are you able to walk up without stopping?  None    Have you had any Emergency Room Visits, Urgent Care Visits, or Hospital Admissions because of your COPD since your last office visit?  No     Pt reports that he quit smoking 3 mo ago    History   Smoking Status     Current Every Day Smoker     Packs/day: 1.00     Types: Cigarettes     Last attempt to quit: 3/10/2015   Smokeless Tobacco     Never " Used     Lab Results   Component Value Date    FEV1 2.59 2014    KNW8NSL 78 2014       Chronic Pain Follow-Up    Where in your body do you have pain? Neck and low back  How has your pain affected your ability to work? Unable to work  Which of these pain treatments have you tried since your last clinic visit? Cold and Heat  How well are you sleeping? Fair  How has your mood been since your last visit? About the same  Have you had a significant life event? No  Other aggravating factors: none  Taking medication as directed? Yes    PHQ-9 SCORE 2019 3/10/2020 2020   PHQ-9 Total Score - - -   PHQ-9 Total Score 0 5 7     RAKESH-7 SCORE 2016   Total Score 0 0 0     PEG Score 3/10/2020   PEG Total Score 2.67     Encounter-Level CSA - 2017:    Controlled Substance Agreement - Scan on 2017  6:30 AM: CONTROLLED SUBSTANCE AGREEMENT     Encounter-Level CSA - 2015:    Controlled Substance Agreement - Scan on 2015 11:59 AM: [place], Controlled Substance Contract, 09-08-15     Patient-Level CSA:    There are no patient-level csa.         Today's PHQ-2 Score:   PHQ-2 (  [employer]) 2019   Q1: Little interest or pleasure in doing things 0   Q2: Feeling down, depressed or hopeless 0   PHQ-2 Score 0   Q1: Little interest or pleasure in doing things -   Q2: Feeling down, depressed or hopeless -   PHQ-2 Score -       Abuse: Current or Past(Physical, Sexual or Emotional)- No  Do you feel safe in your environment? Yes    Pain medication refill was just sent on 20      Social History     Tobacco Use     Smoking status: Current Every Day Smoker     Packs/day: 1.00     Types: Cigarettes     Last attempt to quit: 3/10/2015     Years since quittin.7     Smokeless tobacco: Never Used   Substance Use Topics     Alcohol use: Yes     If you drink alcohol do you typically have >3 drinks per day or >7 drinks per week? No    No flowsheet data found.    Last PSA:   PSA  "  Date Value Ref Range Status   11/09/2017 1.76 0 - 4 ug/L Final     Comment:     Assay Method:  Chemiluminescence using Siemens Vista analyzer       Reviewed orders with patient. Reviewed health maintenance and updated orders accordingly - Yes  Lab work is in process  Labs reviewed in EPIC    Reviewed and updated as needed this visit by clinical staff  Tobacco  Allergies  Meds              Reviewed and updated as needed this visit by Provider                    Review of Systems  CONSTITUTIONAL: NEGATIVE for fever, chills, change in weight  INTEGUMENTARY/SKIN: NEGATIVE for worrisome rashes, moles or lesions  EYES: NEGATIVE for vision changes or irritation  ENT: NEGATIVE for ear, mouth and throat problems  RESP:POSITIVE for cough-non productive, Hx COPD and wheezing  CV: NEGATIVE for chest pain, palpitations or peripheral edema  GI: NEGATIVE for nausea, abdominal pain, heartburn, or change in bowel habits   male: negative for dysuria, hematuria, decreased urinary stream, erectile dysfunction, urethral discharge  MUSCULOSKELETAL:POSITIVE  for back pain low back and neck pain  NEURO: NEGATIVE for weakness, dizziness or paresthesias  PSYCHIATRIC: HX depression    OBJECTIVE:   /82   Pulse 105   Temp 98.2  F (36.8  C) (Oral)   Ht 1.683 m (5' 6.25\")   Wt 98.2 kg (216 lb 6.4 oz)   SpO2 98%   BMI 34.66 kg/m      Physical Exam  GENERAL: healthy, alert and no distress  EYES: Eyes grossly normal to inspection, PERRL and conjunctivae and sclerae normal  HENT: ear canals and TM's normal, nose and mouth without ulcers or lesions  NECK: no adenopathy, no asymmetry, masses, or scars and thyroid normal to palpation  RESP: expiratory wheezes R mid posterior and L mid posterior and prolonged expiratory phase  CV: regular rate and rhythm, normal S1 S2, no S3 or S4, no murmur, click or rub, no peripheral edema and peripheral pulses strong  ABDOMEN: soft, nontender, no hepatosplenomegaly, no masses and bowel sounds " "normal   (male): testicles normal without atrophy or masses, no hernias and penis normal without urethral discharge  RECTAL: normal sphincter tone, no rectal masses and prostate of normal size for age, smooth, nontender without masses/nodules  MS: no gross musculoskeletal defects noted, no edema  SKIN: no suspicious lesions or rashes  NEURO: Normal strength and tone, mentation intact and speech normal  PSYCH: mentation appears normal, affect normal/bright    Diagnostic Test Results:  Labs reviewed in [facility]  Lab: see below, results pending    ASSESSMENT/PLAN:   Claude was seen today for physical.    Diagnoses and all orders for this visit:    Encounter for wellness examination  -     Lipid panel reflex to direct LDL Fasting  -     Comprehensive metabolic panel  -     *UA reflex to Microscopic  -     CBC with platelets    Chronic pain syndrome    Chronic midline low back pain with sciatica, sciatica laterality unspecified    Neck pain    Major depressive disorder, recurrent episode, moderate (H)    Recurrent major depressive disorder, in partial remission (H)    Screening for prostate cancer  -     Prostate spec antigen screen    Simple chronic bronchitis (H)        Patient has been advised of split billing requirements and indicates understanding: Yes  COUNSELING:   Reviewed preventive health counseling, as reflected in patient instructions       Regular exercise       Healthy diet/nutrition       Prostate cancer screening    Estimated body mass index is 34.66 kg/m  as calculated from the following:    Height as of this encounter: 1.683 m (5' 6.25\").    Weight as of this encounter: 98.2 kg (216 lb 6.4 oz).     Weight management plan: Discussed healthy diet and exercise guidelines    He reports that he has been smoking cigarettes. He has been smoking about 1.00 pack per day. He has never used smokeless tobacco.  Tobacco Cessation Action Plan:   Pharmacotherapies : Zyban/Wellbutrin      Counseling Resources:  ATP IV " Guidelines  Pooled Cohorts Equation Calculator  FRAX Risk Assessment  ICSI Preventive Guidelines  Dietary Guidelines for Americans, 2010  USDA's MyPlate  ASA Prophylaxis  Lung CA Screening    Keenan Estrella MD  [employer]    He is at risk for lack of exercise and has been provided with information to increase physical activity for the benefit of his well-being.  The patient was counseled and encouraged to consider modifying their diet and eating habits. He was provided with information on recommended healthy diet options.  The patient's PHQ-9 score is consistent with mild depression. He was provided with information regarding depression and was advised to schedule a follow up appointment in 12 weeks to further address this issue.

## 2021-01-12 DIAGNOSIS — M51.379 DEGENERATION OF LUMBAR OR LUMBOSACRAL INTERVERTEBRAL DISC: ICD-10-CM

## 2021-01-12 DIAGNOSIS — M54.2 CERVICALGIA: ICD-10-CM

## 2021-01-12 DIAGNOSIS — G89.4 CHRONIC PAIN SYNDROME: ICD-10-CM

## 2021-01-14 RX ORDER — HYDROCODONE BITARTRATE AND ACETAMINOPHEN 7.5; 325 MG/1; MG/1
1 TABLET ORAL EVERY 6 HOURS PRN
Qty: 80 TABLET | Refills: 0 | Status: SHIPPED | OUTPATIENT
Start: 2021-01-14 | End: 2021-01-19

## 2021-01-14 NOTE — TELEPHONE ENCOUNTER
Norco        Last written prescription date: 12.14.20        Last fill quantity: 80, # refills: 0         Last office visit: 12.16.20        Future office visit: N/A             Routing refill request to provider for review/approval because: Drug not on the [facility], [facility] or [facility] refill protocol or controlled substance  Writer unable to access

## 2021-01-15 ENCOUNTER — TELEPHONE (OUTPATIENT)
Dept: FAMILY MEDICINE | Facility: CLINIC | Age: 66
End: 2021-01-15

## 2021-01-15 DIAGNOSIS — G89.4 CHRONIC PAIN SYNDROME: Primary | ICD-10-CM

## 2021-01-15 DIAGNOSIS — M54.40 CHRONIC MIDLINE LOW BACK PAIN WITH SCIATICA, SCIATICA LATERALITY UNSPECIFIED: Chronic | ICD-10-CM

## 2021-01-15 DIAGNOSIS — G89.29 CHRONIC MIDLINE LOW BACK PAIN WITH SCIATICA, SCIATICA LATERALITY UNSPECIFIED: Chronic | ICD-10-CM

## 2021-01-15 NOTE — TELEPHONE ENCOUNTER
"Looks like Rx was just approved by Dr Uriostegui on 1/14/21.  PDMP does not show that yet but my last refill on 12/14/20.  He does have chronic pain and has been on pain medication for long time.  Check with the pharmacy and see how many pills were dispensed.  Rx may not have had the \"chronic pain\" noted on it  "

## 2021-01-15 NOTE — TELEPHONE ENCOUNTER
To Prior Auth Dept---please submit a PA for the [place]:    Spoke to pharmacy. Pt's new insurance will only approve a 7 day supply, 28 pills, (pt was prescribed 80 pills), AND pt has a diagnosis of CHRONIC PAIN.   The PA will document chronic pain as a diagnosis, and then insurance should be fine with approving more than a 7 day supply of controlled substances.

## 2021-01-15 NOTE — TELEPHONE ENCOUNTER
Pt called-States his insurance will not fill more than #7 tablets for his pain medications. States provider needs to contact insurance company and let them know this is chronic use:    [employer]     ID Number [id number]  Group [id number]    Phone [phone]

## 2021-01-18 NOTE — TELEPHONE ENCOUNTER
Prior Authorization Not Needed per Insurance    Medication: HYDROcodone-acetaminophen (NORCO) 7.5-325 MG per tablet-PA NOT NEEDED  Insurance Company: [employer] - Phone [phone] Fax [phone]  Expected CoPay:      Pharmacy Filling the Rx: [id number] [place] [address] AT [place] [address]  Pharmacy Notified: Yes  Patient Notified: No    Patient had to fill the 7 day fill first.  Pharmacy will need a new prescription since the remaining balance was voided.

## 2021-01-18 NOTE — TELEPHONE ENCOUNTER
Central Prior Authorization Team   Phone: [phone]      PA Initiation    Medication: HYDROcodone-acetaminophen (NORCO) 7.5-325 MG per tablet-Initiated  Insurance Company: [employer] - Phone [phone] Fax [phone]  Pharmacy Filling the Rx: [id number] - [place] [address] AT [place] [address]  Filling Pharmacy Phone: [phone]  Filling Pharmacy Fax:    Start Date: 1/18/2021

## 2021-01-19 DIAGNOSIS — M54.2 CERVICALGIA: ICD-10-CM

## 2021-01-19 DIAGNOSIS — G89.4 CHRONIC PAIN SYNDROME: ICD-10-CM

## 2021-01-19 DIAGNOSIS — M51.379 DEGENERATION OF LUMBAR OR LUMBOSACRAL INTERVERTEBRAL DISC: ICD-10-CM

## 2021-01-19 RX ORDER — HYDROCODONE BITARTRATE AND ACETAMINOPHEN 7.5; 325 MG/1; MG/1
1 TABLET ORAL EVERY 6 HOURS PRN
Qty: 100 TABLET | Refills: 0 | Status: SHIPPED | OUTPATIENT
Start: 2021-01-19 | End: 2021-02-16

## 2021-01-19 NOTE — TELEPHONE ENCOUNTER
Norco    Routing refill request to provider for review/approval because:  Drug not on the [facility] refill protocol

## 2021-01-19 NOTE — TELEPHONE ENCOUNTER
See PA in 1/12/21, PA not needed but new script because they gave him partial 28 pills because of the PA.

## 2021-01-29 ENCOUNTER — TELEPHONE (OUTPATIENT)
Dept: INTERNAL MEDICINE | Facility: CLINIC | Age: 66
End: 2021-01-29

## 2021-01-29 DIAGNOSIS — G47.33 OSA (OBSTRUCTIVE SLEEP APNEA): Primary | ICD-10-CM

## 2021-01-29 NOTE — TELEPHONE ENCOUNTER
Reason for call:  Order   Order or referral being requested: Cpap machine and supplies  Reason for request: Pt cpap machine broke overnight. He needs a new DME order for both the machine and supplies sent to [facility]. Their fax number [phone]. Pt would like a call when this is done.  Date needed: as soon as possible  Has the patient been seen by the PCP for this problem? YES    Additional comments: N/A    Phone number to reach patient:  Home number on file [phone] (home)        Can we leave a detailed message on this number?  YES    Travel screening: Negative

## 2021-02-01 NOTE — TELEPHONE ENCOUNTER
I have not seen this patient about this issue.  He did see Dr. Addison Franks and had a sleep study done that is in the chart from 2013.  His sleep study was done at one of the [facility] in 2013.  I think the question should be addressed by them.

## 2021-02-03 ENCOUNTER — TELEPHONE (OUTPATIENT)
Dept: INTERNAL MEDICINE | Facility: CLINIC | Age: 66
End: 2021-02-03

## 2021-02-16 DIAGNOSIS — G89.4 CHRONIC PAIN SYNDROME: ICD-10-CM

## 2021-02-16 DIAGNOSIS — M54.2 CERVICALGIA: ICD-10-CM

## 2021-02-16 DIAGNOSIS — M51.379 DEGENERATION OF LUMBAR OR LUMBOSACRAL INTERVERTEBRAL DISC: ICD-10-CM

## 2021-02-16 RX ORDER — HYDROCODONE BITARTRATE AND ACETAMINOPHEN 7.5; 325 MG/1; MG/1
1 TABLET ORAL EVERY 6 HOURS PRN
Qty: 100 TABLET | Refills: 0 | Status: SHIPPED | OUTPATIENT
Start: 2021-02-18 | End: 2021-03-15

## 2021-02-16 NOTE — TELEPHONE ENCOUNTER
Norco    Last Written Prescription Date:  1/19/2021  Last Fill Quantity: 100,  # refills: 1   Last office visit: 12/16/2020 with prescribing provider:  Dr. Estrella   Future Office Visit:    Routing refill request to provider for review/approval because:  Drug not on the [facility] refill protocol

## 2021-03-15 DIAGNOSIS — M54.2 CERVICALGIA: ICD-10-CM

## 2021-03-15 DIAGNOSIS — G89.4 CHRONIC PAIN SYNDROME: ICD-10-CM

## 2021-03-15 DIAGNOSIS — M51.379 DEGENERATION OF LUMBAR OR LUMBOSACRAL INTERVERTEBRAL DISC: ICD-10-CM

## 2021-03-15 RX ORDER — HYDROCODONE BITARTRATE AND ACETAMINOPHEN 7.5; 325 MG/1; MG/1
1 TABLET ORAL EVERY 6 HOURS PRN
Qty: 100 TABLET | Refills: 0 | Status: SHIPPED | OUTPATIENT
Start: 2021-03-15 | End: 2021-04-19

## 2021-03-24 DIAGNOSIS — J41.0 SIMPLE CHRONIC BRONCHITIS (H): ICD-10-CM

## 2021-03-24 RX ORDER — ALBUTEROL SULFATE 0.83 MG/ML
SOLUTION RESPIRATORY (INHALATION)
Qty: 360 ML | Refills: 7 | Status: SHIPPED | OUTPATIENT
Start: 2021-03-24 | End: 2021-12-20

## 2021-04-17 ENCOUNTER — TELEPHONE (OUTPATIENT)
Dept: INTERNAL MEDICINE | Facility: CLINIC | Age: 66
End: 2021-04-17

## 2021-04-17 DIAGNOSIS — M54.2 CERVICALGIA: ICD-10-CM

## 2021-04-17 DIAGNOSIS — M51.379 DEGENERATION OF LUMBAR OR LUMBOSACRAL INTERVERTEBRAL DISC: ICD-10-CM

## 2021-04-17 DIAGNOSIS — G89.4 CHRONIC PAIN SYNDROME: ICD-10-CM

## 2021-04-18 NOTE — TELEPHONE ENCOUNTER
Pt called to request a refill of Vicodin be called into [facility] [address] [place]. Please call pt to advise.      Thank you,   Magan MORRISON   [place] Sched[facility]  [phone]

## 2021-04-19 RX ORDER — HYDROCODONE BITARTRATE AND ACETAMINOPHEN 7.5; 325 MG/1; MG/1
1 TABLET ORAL EVERY 6 HOURS PRN
Qty: 100 TABLET | Refills: 0 | Status: SHIPPED | OUTPATIENT
Start: 2021-04-19 | End: 2021-05-17

## 2021-04-19 NOTE — TELEPHONE ENCOUNTER
Norco    Last Written Prescription Date:  3/15/2021  Last Fill Quantity: 100,  # refills: 0   Last office visit: 12/16/2020 with prescribing provider:  Dr. Estrella  Future Office Visit:      Routing refill request to provider for review/approval because:  Drug not on the [facility] refill protocol     Patient has not yet schedule a re- establishing care visit with a new provider.     Heydi YANEZN, RN, PHN

## 2021-05-08 NOTE — TELEPHONE ENCOUNTER
Medication refills given x2; further refills to be discussed with PCP during annual physical in Nov 2017.  
ramelteon (ROZEREM) 8 MG tablet  Last Written Prescription Date:  8/1/16  Last Fill Quantity: 30,   # refills: 11  Last Office Visit with [facility] or [facility] prescribing provider: 8/7/17   Future Office visit:    Next 5 appointments (look out 90 days)     Nov 09, 2017  1:00 PM CST   PHYSICAL with Addison Franks MD   [facility]    [address] [place] [id number] [phone]                   Routing refill request to provider for review/approval because:  Drug not on the [facility], [facility] or [facility] refill protocol or controlled substance    
fingers/toes warm to touch/no paresthesia/no swelling/no cyanosis of extremity/capillary refill time < 2 seconds

## 2021-05-16 ENCOUNTER — NURSE TRIAGE (OUTPATIENT)
Dept: NURSING | Facility: CLINIC | Age: 66
End: 2021-05-16

## 2021-05-16 DIAGNOSIS — M51.379 DEGENERATION OF LUMBAR OR LUMBOSACRAL INTERVERTEBRAL DISC: ICD-10-CM

## 2021-05-16 DIAGNOSIS — G89.4 CHRONIC PAIN SYNDROME: ICD-10-CM

## 2021-05-16 DIAGNOSIS — M54.2 CERVICALGIA: ICD-10-CM

## 2021-05-17 RX ORDER — HYDROCODONE BITARTRATE AND ACETAMINOPHEN 7.5; 325 MG/1; MG/1
1 TABLET ORAL EVERY 6 HOURS PRN
Qty: 100 TABLET | Refills: 0 | Status: SHIPPED | OUTPATIENT
Start: 2021-05-17 | End: 2021-06-16

## 2021-05-17 RX ORDER — HYDROCODONE BITARTRATE AND ACETAMINOPHEN 7.5; 325 MG/1; MG/1
1 TABLET ORAL EVERY 6 HOURS PRN
Qty: 100 TABLET | Refills: 0 | Status: CANCELLED
Start: 2021-05-17

## 2021-05-17 NOTE — TELEPHONE ENCOUNTER
Norco    Last Written Prescription Date:  4/19/2021  Last Fill Quantity: 100,  # refills: 0   Routing refill request to provider for review/approval because:  Drug not on the [facility] refill protocol

## 2021-05-17 NOTE — TELEPHONE ENCOUNTER
Claude is calling for 2 things:    #1 - He is requesting a refill of:     HYDROcodone-acetaminophen (NORCO) 7.5-325 MG per tablet 100 tablet 0 4/19/2021  No   Sig - Route: Take 1 tablet by mouth every 6 hours as needed for severe pain - Oral     Pharmacy    [id number] - [address], [place] [address] [address] AT [facility] [address]     Claude has difficulty using [employer] and requests a message be sent to his provider.    #2 - He is also interested in receiving the J&J Covid-19 vaccine.  Warm transferred to Covid scheduling    COVID 19 Nurse Triage Plan/Patient Instructions    Please be aware that novel coronavirus (COVID-19) may be circulating in the community. If you develop symptoms such as fever, cough, or SOB or if you have concerns about the presence of another infection including coronavirus (COVID-19), please contact your health care provider or visit [email or web address].     Disposition/Instructions    Home care recommended. Follow home care protocol based instructions.  Virtual Visit with provider recommended. Reference Visit Selection Guide.    Thank you for taking steps to prevent the spread of this virus.  o Limit your contact with others.  o Wear a simple mask to cover your cough.  o Wash your hands well and often.    Resources    [facility]: About COVID-19: [email or web address]    CDC: What to Do If You're Sick: www.cdc.gov/coronavirus/2019-ncov/about/steps-when-sick.html    CDC: Ending Home Isolation: www.cdc.gov/coronavirus/2019-ncov/hcp/disposition-in-home-patients.html     CDC: Caring for Someone: www.cdc.gov/coronavirus/2019-ncov/if-you-are-sick/care-for-someone.html     [facility]: Interim Guidance for Hospital Discharge to Home: www.health.[facility].[place]/diseases/coronavirus/hcp/hospdischarge.pdf    [facility] clinical trials (COVID-19 research studies): clinicalaffairs.[facility]/umn-clinical-trials     Below are the COVID-19 hotlines at the [place]  Department of Health ([facility]). Interpreters are available.   o For health questions: Call 022-565-9387 or 1-277.497.4111 (7 a.m. to 7 p.m.)  o For questions about schools and childcare: Call 756-481-8907 or 1-777.224.8981 (7 a.m. to 7 p.m.)     Qiana Aguiar RN  [facility]      Reason for Disposition    [1] Caller requesting a NON-URGENT new prescription or refill AND [2] triager unable to refill per unit policy    Protocols used: MEDICATION QUESTION CALL-A-

## 2021-05-18 ENCOUNTER — IMMUNIZATION (OUTPATIENT)
Dept: LAB | Facility: CLINIC | Age: 66
End: 2021-05-18
Payer: COMMERCIAL

## 2021-05-18 DIAGNOSIS — G89.29 CHRONIC LEFT-SIDED LOW BACK PAIN WITHOUT SCIATICA: ICD-10-CM

## 2021-05-18 DIAGNOSIS — M54.50 CHRONIC LEFT-SIDED LOW BACK PAIN WITHOUT SCIATICA: ICD-10-CM

## 2021-05-18 DIAGNOSIS — E78.5 HYPERLIPIDEMIA WITH TARGET LDL LESS THAN 130: Chronic | ICD-10-CM

## 2021-05-18 RX ORDER — CYCLOBENZAPRINE HCL 5 MG
TABLET ORAL
Qty: 60 TABLET | Refills: 3 | Status: SHIPPED | OUTPATIENT
Start: 2021-05-18 | End: 2021-12-20

## 2021-05-18 RX ORDER — SIMVASTATIN 10 MG
TABLET ORAL
Qty: 90 TABLET | Refills: 1 | Status: SHIPPED | OUTPATIENT
Start: 2021-05-18 | End: 2021-08-17

## 2021-05-18 NOTE — TELEPHONE ENCOUNTER
Flexeril    Routing refill request to provider for review/approval because:  Drug not on the [facility] refill protocol

## 2021-06-15 ENCOUNTER — NURSE TRIAGE (OUTPATIENT)
Dept: NURSING | Facility: CLINIC | Age: 66
End: 2021-06-15

## 2021-06-15 NOTE — TELEPHONE ENCOUNTER
Patient would like a refill of his Hydrocodone, prescribed on 5-17-21, but says [facility] will not do it, so he has to call clinic rather than submit request to them.    Patient is also calling wanting to officially ask Dr. Estrella to be his PCP after Dr. Uriostegui retires in August.    Routed to [facility] and Dr. Sameer Dimas RN  [facility]      Reason for Disposition    [1] Caller requesting a NON-URGENT new prescription or refill AND [2] triager unable to refill per unit policy    Protocols used: MEDICATION QUESTION CALL-A-

## 2021-06-16 DIAGNOSIS — M54.2 CERVICALGIA: ICD-10-CM

## 2021-06-16 DIAGNOSIS — G89.4 CHRONIC PAIN SYNDROME: ICD-10-CM

## 2021-06-16 DIAGNOSIS — M51.379 DEGENERATION OF LUMBAR OR LUMBOSACRAL INTERVERTEBRAL DISC: ICD-10-CM

## 2021-06-16 RX ORDER — HYDROCODONE BITARTRATE AND ACETAMINOPHEN 7.5; 325 MG/1; MG/1
1 TABLET ORAL EVERY 6 HOURS PRN
Qty: 100 TABLET | Refills: 0 | Status: SHIPPED | OUTPATIENT
Start: 2021-06-16 | End: 2021-07-16

## 2021-07-16 DIAGNOSIS — J41.0 SIMPLE CHRONIC BRONCHITIS (H): ICD-10-CM

## 2021-07-16 DIAGNOSIS — G89.4 CHRONIC PAIN SYNDROME: ICD-10-CM

## 2021-07-16 DIAGNOSIS — M51.379 DEGENERATION OF LUMBAR OR LUMBOSACRAL INTERVERTEBRAL DISC: ICD-10-CM

## 2021-07-16 DIAGNOSIS — M54.2 CERVICALGIA: ICD-10-CM

## 2021-07-16 RX ORDER — ALBUTEROL SULFATE 90 UG/1
AEROSOL, METERED RESPIRATORY (INHALATION)
Qty: 54 G | Refills: 0 | Status: SHIPPED | OUTPATIENT
Start: 2021-07-16 | End: 2021-10-05

## 2021-07-16 RX ORDER — HYDROCODONE BITARTRATE AND ACETAMINOPHEN 7.5; 325 MG/1; MG/1
1 TABLET ORAL EVERY 6 HOURS PRN
Qty: 100 TABLET | Refills: 0 | Status: SHIPPED | OUTPATIENT
Start: 2021-07-16 | End: 2021-08-16

## 2021-07-19 NOTE — TELEPHONE ENCOUNTER
[employer] message sent  Zabrina Alonzo CMA on 7/19/2021 at 5:46 PM    
Routing refill request to provider for review/approval because:  Patient needs to be seen per protocol and asthma assessment.     Also routing to TC to reach out and schedule patient.    Fidelia Mayberry, RN  [facility]  
DISPLAY PLAN FREE TEXT

## 2021-07-29 ENCOUNTER — VIRTUAL VISIT (OUTPATIENT)
Dept: INTERNAL MEDICINE | Facility: CLINIC | Age: 66
End: 2021-07-29
Payer: COMMERCIAL

## 2021-07-29 DIAGNOSIS — G89.4 CHRONIC PAIN SYNDROME: ICD-10-CM

## 2021-07-29 DIAGNOSIS — M51.379 DEGENERATION OF LUMBAR OR LUMBOSACRAL INTERVERTEBRAL DISC: ICD-10-CM

## 2021-07-29 DIAGNOSIS — F41.8 MIXED ANXIETY DEPRESSIVE DISORDER: ICD-10-CM

## 2021-07-29 DIAGNOSIS — M50.30 DEGENERATION OF CERVICAL INTERVERTEBRAL DISC: Chronic | ICD-10-CM

## 2021-07-29 DIAGNOSIS — M54.40 CHRONIC MIDLINE LOW BACK PAIN WITH SCIATICA, SCIATICA LATERALITY UNSPECIFIED: Primary | ICD-10-CM

## 2021-07-29 DIAGNOSIS — G89.29 CHRONIC MIDLINE LOW BACK PAIN WITH SCIATICA, SCIATICA LATERALITY UNSPECIFIED: Primary | ICD-10-CM

## 2021-07-29 PROCEDURE — 99213 OFFICE O/P EST LOW 20 MIN: CPT | Mod: GT | Performed by: FAMILY MEDICINE

## 2021-07-29 ASSESSMENT — ASTHMA QUESTIONNAIRES
QUESTION_3 LAST FOUR WEEKS HOW OFTEN DID YOUR ASTHMA SYMPTOMS (WHEEZING, COUGHING, SHORTNESS OF BREATH, CHEST TIGHTNESS OR PAIN) WAKE YOU UP AT NIGHT OR EARLIER THAN USUAL IN THE MORNING: ONCE OR TWICE
QUESTION_4 LAST FOUR WEEKS HOW OFTEN HAVE YOU USED YOUR RESCUE INHALER OR NEBULIZER MEDICATION (SUCH AS ALBUTEROL): TWO OR THREE TIMES PER WEEK
QUESTION_2 LAST FOUR WEEKS HOW OFTEN HAVE YOU HAD SHORTNESS OF BREATH: NOT AT ALL
QUESTION_1 LAST FOUR WEEKS HOW MUCH OF THE TIME DID YOUR ASTHMA KEEP YOU FROM GETTING AS MUCH DONE AT WORK, SCHOOL OR AT HOME: NONE OF THE TIME
QUESTION_5 LAST FOUR WEEKS HOW WOULD YOU RATE YOUR ASTHMA CONTROL: WELL CONTROLLED
ACT_TOTALSCORE: 21

## 2021-07-29 ASSESSMENT — PATIENT HEALTH QUESTIONNAIRE - PHQ9: SUM OF ALL RESPONSES TO PHQ QUESTIONS 1-9: 15

## 2021-07-29 NOTE — PATIENT INSTRUCTIONS
We had a discussion about my upcoming care home.  Patient is adamant that he is only going to come in once a year for his annual exam in December.  [facility] is [facility].  I suggested that he go on a look at the practitioners that are at that clinic.  Again, he is adamant that he will not go in earlier than December even if it means he cannot get his pain medication.  The medication has worked pretty well to take the edge off of his pain and keep him functional.  He says that it works even better if he takes it with some coffee.

## 2021-07-29 NOTE — PROGRESS NOTES
"Claude is a 65 year old who is being evaluated via a billable video visit.      How would you like to obtain your AVS? MyChart  If the video visit is dropped, the invitation should be resent by: Send to e-mail at: [email or web address]  Will anyone else be joining your video visit? No    Video Start Time: 2:23    Assessment & Plan     Chronic midline low back pain with sciatica, sciatica laterality unspecified      Chronic pain syndrome      Mixed anxiety depressive disorder      Degeneration of lumbar or lumbosacral intervertebral disc      Degeneration of cervical intervertebral disc                 BMI:   Estimated body mass index is 34.66 kg/m  as calculated from the following:    Height as of 12/16/20: 1.683 m (5' 6.25\").    Weight as of 12/16/20: 98.2 kg (216 lb 6.4 oz).       Patient Instructions   We had a discussion about my upcoming residential.  Patient is adamant that he is only going to come in once a year for his annual exam in December.  He is thinking of changing care to the [facility].  I suggested that he go on a look at the practitioners that are at that clinic.  Again, he is adamant that he will not go in earlier than December even if it means he cannot get his pain medication.  The medication has worked pretty well to take the edge off of his pain and keep him functional.  He says that it works even better if he takes it with some coffee.      Return in about 5 months (around 12/29/2021) for preventive visit.    Rick Uriostegui MD  [facility]    Subjective   Claude is a 65 year old who presents for the following health issues     HPI     Chronic Pain Follow-Up    Where in your body do you have pain? Neck and low back  How has your pain affected your ability to work? Not applicable  Which of these pain treatments have you tried since your last clinic visit? Other: none  How well are you sleeping? Poor  How has your mood been since your last visit? " About the same  Have you had a significant life event? No  Other aggravating factors: in constant pain  Taking medication as directed? Yes    PHQ-9 SCORE 3/10/2020 12/16/2020 7/29/2021   PHQ-9 Total Score - - -   PHQ-9 Total Score 5 7 15     RAKESH-7 SCORE 11/7/2016 2/6/2018 5/9/2019   Total Score 0 0 0     PEG Score 3/10/2020   PEG Total Score 2.67     Encounter-Level CSA - 11/09/2017:    Controlled Substance Agreement - Scan on 11/21/2017  6:30 AM: CONTROLLED SUBSTANCE AGREEMENT     Encounter-Level CSA - 09/08/2015:    Controlled Substance Agreement - Scan on 9/16/2015 11:59 AM: BM, Controlled Substance Contract, 09-08-15     Patient-Level CSA:    There are no patient-level csa.         How many servings of fruits and vegetables do you eat daily?  0-1    On average, how many sweetened beverages do you drink each day (Examples: soda, juice, sweet tea, etc.  Do NOT count diet or artificially sweetened beverages)?   0    How many days per week do you exercise enough to make your heart beat faster? 3 or less 10.000 steps a day    How many minutes a day do you exercise enough to make your heart beat faster? 9 or less    How many days per week do you miss taking your medication? 0        Review of Systems   Constitutional, HEENT, cardiovascular, pulmonary, gi and gu systems are negative, except as otherwise noted.      Objective           Vitals:  No vitals were obtained today due to virtual visit.    Physical Exam   GENERAL: Healthy, alert and no distress  EYES: Eyes grossly normal to inspection.  No discharge or erythema, or obvious scleral/conjunctival abnormalities.  RESP: No audible wheeze, cough, or visible cyanosis.  No visible retractions or increased work of breathing.    SKIN: Visible skin clear. No significant rash, abnormal pigmentation or lesions.  NEURO: Cranial nerves grossly intact.  Mentation and speech appropriate for age.  PSYCH: Mentation appears normal, affect normal/bright, judgement and insight  intact, normal speech and appearance well-groomed.                Video-Visit Details    Type of service:  Video Visit    Video End Time:2:40    Originating Location (pt. Location): Home    Distant Location (provider location):  [facility]     Platform used for Video Visit: Bee

## 2021-07-30 ASSESSMENT — ASTHMA QUESTIONNAIRES: ACT_TOTALSCORE: 21

## 2021-08-13 ENCOUNTER — TELEPHONE (OUTPATIENT)
Dept: INTERNAL MEDICINE | Facility: CLINIC | Age: 66
End: 2021-08-13

## 2021-08-13 DIAGNOSIS — G89.4 CHRONIC PAIN SYNDROME: ICD-10-CM

## 2021-08-13 DIAGNOSIS — M54.2 CERVICALGIA: ICD-10-CM

## 2021-08-13 DIAGNOSIS — M51.379 DEGENERATION OF LUMBAR OR LUMBOSACRAL INTERVERTEBRAL DISC: ICD-10-CM

## 2021-08-13 NOTE — TELEPHONE ENCOUNTER
Pt calling and requesting new PCP doctor for establish of care. He is wanting a male doctor and wondering who is taking new patients. He does not want to travel unnecessarily . He is wanting to make sure he medications are not disrupted. He is okay with doing virtual if needed. Please call to advise and update pt [phone].  Alda Chan

## 2021-08-15 DIAGNOSIS — E78.5 HYPERLIPIDEMIA WITH TARGET LDL LESS THAN 130: Chronic | ICD-10-CM

## 2021-08-16 RX ORDER — HYDROCODONE BITARTRATE AND ACETAMINOPHEN 7.5; 325 MG/1; MG/1
1 TABLET ORAL EVERY 6 HOURS PRN
Qty: 100 TABLET | Refills: 0 | Status: SHIPPED | OUTPATIENT
Start: 2021-08-16

## 2021-08-16 NOTE — TELEPHONE ENCOUNTER
Below request for provider can be done by TC?    Looks like medication is also attached for refill of Norco.  That is pended for PCP review.    Routing refill request to provider for review/approval because:  Drug not on the [facility] refill protocol     Pending Prescriptions:                       Disp   Refills    HYDROcodone-acetaminophen (NORCO) 7.5-325*100 ta*0            Sig: Take 1 tablet by mouth every 6 hours as needed           for severe pain    Last Written Prescription Date:  07/16/21  Last Fill Quantity: 100,  # refills: 0   Last office visit: 12/16/2020 with prescribing provider:  Gila    Future Office Visit:      Carrie Win RN  [facility] Triage Nurse

## 2021-08-17 RX ORDER — SIMVASTATIN 10 MG
TABLET ORAL
Qty: 90 TABLET | Refills: 1 | Status: SHIPPED | OUTPATIENT
Start: 2021-08-17 | End: 2021-12-20

## 2021-08-17 NOTE — TELEPHONE ENCOUNTER
Prescription approved per [facility] Refill Protocol.  Giovani Melvin RN  [facility] Triage Nurse

## 2021-09-14 DIAGNOSIS — M51.379 DEGENERATION OF LUMBAR OR LUMBOSACRAL INTERVERTEBRAL DISC: ICD-10-CM

## 2021-09-14 DIAGNOSIS — G89.4 CHRONIC PAIN SYNDROME: ICD-10-CM

## 2021-09-14 DIAGNOSIS — M54.2 CERVICALGIA: ICD-10-CM

## 2021-09-14 RX ORDER — HYDROCODONE BITARTRATE AND ACETAMINOPHEN 7.5; 325 MG/1; MG/1
1 TABLET ORAL EVERY 6 HOURS PRN
Qty: 100 TABLET | Refills: 0 | OUTPATIENT
Start: 2021-09-14

## 2021-09-14 NOTE — TELEPHONE ENCOUNTER
Controlled Substance Refill Request for: HYDROcodone-acetaminophen (NORCO) 7.5-325 MG per tablet  Problem List Complete:  Yes    Patient is followed by JEANNIE LO MD for ongoing prescription of pain medication.  All refills should be approved by this provider, or covering partner.    Medication(s):  NORCO 7.5MG PO FOUR TIMES DAILY .   Maximum quantity per month:  120   Clinic visit frequency required: Q 3 months     Controlled substance agreement:  Encounter-Level CSA - 9/8/15:               Controlled Substance Agreement - Scan on 9/16/2015 11:59 AM : [facility], Controlled Substance Contract, 09-08-15 (below)            Pain Clinic evaluation in the past: No    DIRE Total Score(s):    11/2/2016   Total Score 17       Last [facility] website verification: 09/14/20 - no concerns   [email or web address]         checked in past 3 months? Per provider

## 2021-10-24 ENCOUNTER — HEALTH MAINTENANCE LETTER (OUTPATIENT)
Age: 66
End: 2021-10-24

## 2021-12-20 ENCOUNTER — OFFICE VISIT (OUTPATIENT)
Dept: FAMILY MEDICINE | Facility: CLINIC | Age: 66
End: 2021-12-20
Payer: COMMERCIAL

## 2021-12-20 VITALS
OXYGEN SATURATION: 98 % | WEIGHT: 212.6 LBS | BODY MASS INDEX: 34.17 KG/M2 | TEMPERATURE: 98.2 F | HEART RATE: 100 BPM | HEIGHT: 66 IN | SYSTOLIC BLOOD PRESSURE: 150 MMHG | RESPIRATION RATE: 22 BRPM | DIASTOLIC BLOOD PRESSURE: 80 MMHG

## 2021-12-20 DIAGNOSIS — E78.5 HYPERLIPIDEMIA WITH TARGET LDL LESS THAN 130: Chronic | ICD-10-CM

## 2021-12-20 DIAGNOSIS — J45.30 ASTHMA, CHRONIC, MILD PERSISTENT, UNCOMPLICATED: ICD-10-CM

## 2021-12-20 DIAGNOSIS — M54.2 CERVICALGIA: ICD-10-CM

## 2021-12-20 DIAGNOSIS — M51.379 DEGENERATION OF LUMBAR OR LUMBOSACRAL INTERVERTEBRAL DISC: ICD-10-CM

## 2021-12-20 DIAGNOSIS — G89.4 CHRONIC PAIN SYNDROME: ICD-10-CM

## 2021-12-20 DIAGNOSIS — R21 RASH: ICD-10-CM

## 2021-12-20 DIAGNOSIS — G25.81 RESTLESS LEGS SYNDROME (RLS): ICD-10-CM

## 2021-12-20 DIAGNOSIS — F41.8 MIXED ANXIETY DEPRESSIVE DISORDER: ICD-10-CM

## 2021-12-20 DIAGNOSIS — E55.9 VITAMIN D DEFICIENCY: ICD-10-CM

## 2021-12-20 DIAGNOSIS — G89.29 CHRONIC LEFT-SIDED LOW BACK PAIN WITHOUT SCIATICA: ICD-10-CM

## 2021-12-20 DIAGNOSIS — Z00.00 ROUTINE GENERAL MEDICAL EXAMINATION AT A HEALTH CARE FACILITY: Primary | ICD-10-CM

## 2021-12-20 DIAGNOSIS — M54.50 CHRONIC LEFT-SIDED LOW BACK PAIN WITHOUT SCIATICA: ICD-10-CM

## 2021-12-20 DIAGNOSIS — Z11.4 SCREENING FOR HIV (HUMAN IMMUNODEFICIENCY VIRUS): ICD-10-CM

## 2021-12-20 LAB
BASOPHILS # BLD AUTO: 0 10E3/UL (ref 0–0.2)
BASOPHILS NFR BLD AUTO: 0 %
EOSINOPHIL # BLD AUTO: 0.1 10E3/UL (ref 0–0.7)
EOSINOPHIL NFR BLD AUTO: 2 %
ERYTHROCYTE [DISTWIDTH] IN BLOOD BY AUTOMATED COUNT: 14.7 % (ref 10–15)
HCT VFR BLD AUTO: 41.2 % (ref 40–53)
HGB BLD-MCNC: 14.2 G/DL (ref 13.3–17.7)
HIV 1+2 AB+HIV1 P24 AG SERPL QL IA: NONREACTIVE
LYMPHOCYTES # BLD AUTO: 1.8 10E3/UL (ref 0.8–5.3)
LYMPHOCYTES NFR BLD AUTO: 25 %
MCH RBC QN AUTO: 33.3 PG (ref 26.5–33)
MCHC RBC AUTO-ENTMCNC: 34.5 G/DL (ref 31.5–36.5)
MCV RBC AUTO: 97 FL (ref 78–100)
MONOCYTES # BLD AUTO: 0.8 10E3/UL (ref 0–1.3)
MONOCYTES NFR BLD AUTO: 11 %
NEUTROPHILS # BLD AUTO: 4.4 10E3/UL (ref 1.6–8.3)
NEUTROPHILS NFR BLD AUTO: 62 %
PLATELET # BLD AUTO: 242 10E3/UL (ref 150–450)
RBC # BLD AUTO: 4.27 10E6/UL (ref 4.4–5.9)
WBC # BLD AUTO: 7.1 10E3/UL (ref 4–11)

## 2021-12-20 PROCEDURE — 85025 COMPLETE CBC W/AUTO DIFF WBC: CPT | Performed by: FAMILY MEDICINE

## 2021-12-20 PROCEDURE — G0103 PSA SCREENING: HCPCS | Performed by: FAMILY MEDICINE

## 2021-12-20 PROCEDURE — 87389 HIV-1 AG W/HIV-1&-2 AB AG IA: CPT | Performed by: FAMILY MEDICINE

## 2021-12-20 PROCEDURE — 36415 COLL VENOUS BLD VENIPUNCTURE: CPT | Performed by: FAMILY MEDICINE

## 2021-12-20 PROCEDURE — 80053 COMPREHEN METABOLIC PANEL: CPT | Performed by: FAMILY MEDICINE

## 2021-12-20 PROCEDURE — 80061 LIPID PANEL: CPT | Performed by: FAMILY MEDICINE

## 2021-12-20 PROCEDURE — 99396 PREV VISIT EST AGE 40-64: CPT | Performed by: FAMILY MEDICINE

## 2021-12-20 RX ORDER — CARBIDOPA AND LEVODOPA 25; 100 MG/1; MG/1
1 TABLET ORAL AT BEDTIME
Qty: 90 TABLET | Refills: 1 | Status: CANCELLED | OUTPATIENT
Start: 2021-12-20

## 2021-12-20 RX ORDER — CARBIDOPA AND LEVODOPA 25; 100 MG/1; MG/1
1 TABLET ORAL AT BEDTIME
Qty: 90 TABLET | Refills: 3 | Status: SHIPPED | OUTPATIENT
Start: 2021-12-20

## 2021-12-20 RX ORDER — FLUTICASONE PROPIONATE 50 MCG
SPRAY, SUSPENSION (ML) NASAL
Qty: 16 G | Refills: 3 | Status: SHIPPED | OUTPATIENT
Start: 2021-12-20

## 2021-12-20 RX ORDER — FLUTICASONE PROPIONATE 50 MCG
SPRAY, SUSPENSION (ML) NASAL
Qty: 16 G | Refills: 3 | Status: CANCELLED | OUTPATIENT
Start: 2021-12-20

## 2021-12-20 RX ORDER — ALBUTEROL SULFATE 0.83 MG/ML
SOLUTION RESPIRATORY (INHALATION)
Qty: 360 ML | Refills: 7 | Status: SHIPPED | OUTPATIENT
Start: 2021-12-20

## 2021-12-20 RX ORDER — SIMVASTATIN 10 MG
10 TABLET ORAL DAILY
Qty: 90 TABLET | Refills: 3 | Status: SHIPPED | OUTPATIENT
Start: 2021-12-20

## 2021-12-20 RX ORDER — CYCLOBENZAPRINE HCL 5 MG
TABLET ORAL
Qty: 60 TABLET | Refills: 3 | Status: CANCELLED | OUTPATIENT
Start: 2021-12-20

## 2021-12-20 RX ORDER — TRIAMCINOLONE ACETONIDE 1 MG/G
CREAM TOPICAL
Qty: 30 G | Refills: 0 | Status: SHIPPED | OUTPATIENT
Start: 2021-12-20

## 2021-12-20 RX ORDER — HYDROXYZINE PAMOATE 25 MG/1
CAPSULE ORAL
Qty: 90 CAPSULE | Refills: 3 | Status: SHIPPED | OUTPATIENT
Start: 2021-12-20

## 2021-12-20 RX ORDER — IPRATROPIUM BROMIDE 21 UG/1
1 SPRAY, METERED NASAL 2 TIMES DAILY
Qty: 30 ML | Refills: 2 | Status: SHIPPED | OUTPATIENT
Start: 2021-12-20

## 2021-12-20 RX ORDER — ALBUTEROL SULFATE 90 UG/1
AEROSOL, METERED RESPIRATORY (INHALATION)
Qty: 54 G | Refills: 0 | Status: CANCELLED | OUTPATIENT
Start: 2021-12-20

## 2021-12-20 RX ORDER — CYCLOBENZAPRINE HCL 5 MG
TABLET ORAL
Qty: 60 TABLET | Refills: 3 | Status: SHIPPED | OUTPATIENT
Start: 2021-12-20 | End: 2021-12-20

## 2021-12-20 RX ORDER — HYDROCODONE BITARTRATE AND ACETAMINOPHEN 7.5; 325 MG/1; MG/1
1 TABLET ORAL EVERY 6 HOURS PRN
Qty: 100 TABLET | Refills: 0 | Status: CANCELLED | OUTPATIENT
Start: 2021-12-20

## 2021-12-20 RX ORDER — HYDROXYZINE PAMOATE 25 MG/1
CAPSULE ORAL
Qty: 90 CAPSULE | Refills: 1 | Status: CANCELLED | OUTPATIENT
Start: 2021-12-20

## 2021-12-20 RX ORDER — MULTIVIT-MIN/IRON/FOLIC ACID/K 18-600-40
1 CAPSULE ORAL DAILY
Qty: 90 CAPSULE | Refills: 3 | Status: SHIPPED | OUTPATIENT
Start: 2021-12-20

## 2021-12-20 RX ORDER — BUPROPION HYDROCHLORIDE 150 MG/1
TABLET, FILM COATED, EXTENDED RELEASE ORAL
Qty: 60 TABLET | Refills: 1 | Status: CANCELLED | OUTPATIENT
Start: 2021-12-20

## 2021-12-20 RX ORDER — CYCLOBENZAPRINE HCL 5 MG
TABLET ORAL
Qty: 60 TABLET | Refills: 3 | Status: SHIPPED | OUTPATIENT
Start: 2021-12-20

## 2021-12-20 ASSESSMENT — PAIN SCALES - GENERAL: PAINLEVEL: NO PAIN (0)

## 2021-12-20 ASSESSMENT — MIFFLIN-ST. JEOR: SCORE: 1701.07

## 2021-12-20 NOTE — PROGRESS NOTES
SUBJECTIVE:   CC: Claude D Feinstein is an 66 year old male who presents for preventative health visit.   Patient comes for a physical exam, as a new patient, establish care.  History of tobacco abuse, history of asthma.  Does use inhalers, nebulizers as needed.  History of restless leg syndrome.    History of degenerative lumbar disease, chronic back pain, neck pain and shoulder pain.  Patient been on hydrocodone/ acetaminophen 7.5/325 mg 1 tablet every 4-6 hours as needed.  He has been receiving 100 tablet, per months from his previous physician.  He reports that the previous physician retired and he is here to establish care, continue with his current pain medication.  Patient reports he has not started any other medications in the past, except for ibuprofen and Tylenol is not helping.  Patient is not willing to try any other medications, does not want any tapered dose.  He requested all his medicine, 100 tablets/month or no tablet at all.  He is not willing to be tapered.    Patient has been advised of split billing requirements and indicates understanding: Yes  Healthy Habits:     Getting at least 3 servings of Calcium per day:  NO    Bi-annual eye exam:  Yes    Dental care twice a year:  Yes    Sleep apnea or symptoms of sleep apnea:  Sleep apnea (not sleeping well )    Diet:  Regular (no restrictions)    Frequency of exercise:  None    Duration of exercise:  N/A    Taking medications regularly:  Yes    Barriers to taking medications:  Not applicable    Medication side effects:  Not applicable    PHQ-2 Total Score: 1    Additional concerns today:  No    Today's PHQ-2 Score:   PHQ-2 ( 1999 [employer]) 12/9/2019   Q1: Little interest or pleasure in doing things 0   Q2: Feeling down, depressed or hopeless 0   PHQ-2 Score 0   PHQ-2 Total Score (12-17 Years)- Positive if 3 or more points; Administer PHQ-A if positive 0   Q1: Little interest or pleasure in doing things -   Q2: Feeling down, depressed or hopeless -    PHQ-2 Score -       Abuse: Current or Past(Physical, Sexual or Emotional)- NO  Do you feel safe in your environment? YES        Social History     Tobacco Use     Smoking status: Current Every Day Smoker     Packs/day: 1.00     Types: Cigarettes     Last attempt to quit: 3/10/2015     Years since quittin.7     Smokeless tobacco: Never Used   Substance Use Topics     Alcohol use: Yes     If you drink alcohol do you typically have >3 drinks per day or >7 drinks per week? No    No flowsheet data found.    Last PSA:   PSA   Date Value Ref Range Status   2020 1.45 0 - 4 ug/L Final     Comment:     Assay Method:  Chemiluminescence using Siemens Vista analyzer       Reviewed orders with patient. Reviewed health maintenance and updated orders accordingly - Yes  Labs reviewed in EPIC  BP Readings from Last 3 Encounters:   21 (!) 150/80   20 128/82   03/10/20 136/88    Wt Readings from Last 3 Encounters:   21 96.4 kg (212 lb 9.6 oz)   20 98.2 kg (216 lb 6.4 oz)   03/10/20 92.1 kg (203 lb)                  Patient Active Problem List   Diagnosis     Degeneration of cervical intervertebral disc     Restless legs syndrome (RLS)     Pruritic disorder     Degeneration of lumbar or lumbosacral intervertebral disc     External hemorrhoids     Primary localized osteoarthrosis, lower leg     Illiterate     DALTON (obstructive sleep apnea)- mild (AHI 5.9)     Hyperlipidemia with target LDL less than 130     Mixed anxiety depressive disorder     Advance Care Planning     Anxiety     Chronic pain syndrome     Midline low back pain with sciatica     Status post total bilateral knee replacement     Psychophysiologic insomnia     Past Surgical History:   Procedure Laterality Date     ANKLE SURGERY  2007    synovectomy     CARPAL TUNNEL RELEASE RT/LT       FOOT SURGERY      L bunionectomy     HEAD & NECK SURGERY      plate in head     KNEE SURGERY      R Knee Arthroscopy     ORTHOPEDIC  SURGERY      arm surgery laceration R forearm       Social History     Tobacco Use     Smoking status: Current Every Day Smoker     Packs/day: 1.00     Types: Cigarettes     Last attempt to quit: 3/10/2015     Years since quittin.7     Smokeless tobacco: Never Used   Substance Use Topics     Alcohol use: Yes     Family History   Problem Relation Age of Onset     Alzheimer Disease Mother          Current Outpatient Medications   Medication Sig Dispense Refill     albuterol (PROVENTIL) (2.5 MG/3ML) 0.083% neb solution NEBULIZE AND INHALE 1 VIAL EVERY 6 HOURS AS NEEDED FOR SHORTNESS OF BREATH/DYSPNEA OR WHEEZING. 360 mL 7     carbidopa-levodopa (SINEMET)  MG tablet Take 1 tablet by mouth At Bedtime 90 tablet 3     Cholecalciferol (VITAMIN D) 50 MCG (2000 UT) CAPS Take 1 capsule by mouth daily 90 capsule 3     cyclobenzaprine (FLEXERIL) 5 MG tablet TAKE 1 TABLET(5 MG) BY MOUTH TWICE DAILY AS NEEDED FOR MUSCLE SPASMS 60 tablet 3     fluticasone (FLONASE) 50 MCG/ACT nasal spray SHAKE LIQUID AND USE 1 TO 2 SPRAYS IN EACH NOSTRIL DAILY 16 g 3     HYDROcodone-acetaminophen (NORCO) 7.5-325 MG per tablet Take 1 tablet by mouth every 6 hours as needed for severe pain 100 tablet 0     hydrOXYzine (VISTARIL) 25 MG capsule TAKE 2 CAPSULES(50 MG) BY MOUTH THREE TIMES DAILY AS NEEDED FOR ITCHING 90 capsule 3     ipratropium (ATROVENT) 0.03 % nasal spray Spray 1 spray into both nostrils 2 times daily 30 mL 2     lidocaine (ASPERCREME W/LIDOCAINE) 4 % CREA 4% topical cream Apply topically once as needed for mild pain 120 g 11     Melatonin (MELATONIN TR) 10 MG TBCR Take 1 tablet by mouth every evening 30 tablet 11     mineral oil-white petrolatum (MINERIN, EUCERIN) CREA Apply topically two times daily 454 g 11     ORDER FOR DME autocpap 6-10 cm       Saw Palmetto, Serenoa repens, (SAW PALMETTO FRUIT PO) Take  by mouth.       SHINGRIX injection        simvastatin (ZOCOR) 10 MG tablet Take 1 tablet (10 mg) by mouth daily  90 tablet 3     Skin Protectants, Misc. (EUCERIN) cream Apply topically as needed for dry skin 454 g G     triamcinolone (KENALOG) 0.1 % external cream APPLY SPARINGLY TO THE AFFECTED AREA THREE TIMES A DAY AS NEEDED. 30 g 0     Tdap, tetanus-diptheria-acell pertussis, (BOOSTRIX) 5-2.5-18.5 LF-MCG/0.5 injection          Reviewed and updated as needed this visit by clinical staff                Reviewed and updated as needed this visit by Provider               Past Medical History:   Diagnosis Date     Arthritis      Habitual alcohol use 10/22/2013    REMISSION  1-2 PER DAY IMPROVED     Habitual alcohol use 10/22/2013    COMPLETE REMISSION CURRENT REGIMEN     Habitual alcohol use 10/22/2013    COMPLETE REMISSION CURRENT REGIMEN     History of blood transfusion      Polysubstance abuse (H) 3/12/2014    MARIJUANA AND ALCOHOL, TOBACCO 2 PPD  UNWILLING TO SIGN NARCOTIC AGREEEMENT       Past Surgical History:   Procedure Laterality Date     ANKLE SURGERY  7/16/2007    synovectomy     CARPAL TUNNEL RELEASE RT/LT       FOOT SURGERY  1991    L bunionectomy     HEAD & NECK SURGERY  1958    plate in head     KNEE SURGERY  1991    R Knee Arthroscopy     ORTHOPEDIC SURGERY  1990    arm surgery laceration R forearm     OB History   No obstetric history on file.       Review of Systems  CONSTITUTIONAL: NEGATIVE for fever, chills, change in weight  INTEGUMENTARY/SKIN: NEGATIVE for worrisome rashes, moles or lesions  EYES: NEGATIVE for vision changes or irritation  ENT: NEGATIVE for ear, mouth and throat problems  RESP: NEGATIVE for significant cough or SOB  CV: NEGATIVE for chest pain, palpitations or peripheral edema  GI: NEGATIVE for nausea, abdominal pain, heartburn, or change in bowel habits   male: negative for dysuria, hematuria, decreased urinary stream, erectile dysfunction, urethral discharge  MUSCULOSKELETAL:POSITIVE  for arthralgias back, neck, shoulder  and back pain   NEURO: NEGATIVE for weakness, dizziness or  paresthesias  PSYCHIATRIC: NEGATIVE for changes in mood or affect    OBJECTIVE:   There were no vitals taken for this visit.    Physical Exam  GENERAL: healthy, alert and no distress  HENT: ear canals and TM's normal, nose and mouth without ulcers or lesions  NECK: no adenopathy, no asymmetry, masses, or scars and thyroid normal to palpation  RESP: lungs clear to auscultation - no rales, rhonchi or wheezes  CV: regular rate and rhythm, normal S1 S2, no S3 or S4, no murmur, click or rub, no peripheral edema and peripheral pulses strong  ABDOMEN: soft, nontender, no hepatosplenomegaly, no masses and bowel sounds normal  MS: no gross musculoskeletal defects noted, no edema  SKIN: no suspicious lesions or rashes  NEURO: Normal strength and tone, mentation intact and speech normal  PSYCH: mentation appears normal, affect normal/bright    Orders Placed This Encounter   Procedures     HIV Antigen Antibody Combo     PSA, screen     Comprehensive metabolic panel (BMP + Alb, Alk Phos, ALT, AST, Total. Bili, TP)     Lipid panel reflex to direct LDL Fasting     CBC with platelets and differential     CBC with platelets and differential         ASSESSMENT/PLAN:     (Z00.00) Routine general medical examination at a health care facility  (primary encounter diagnosis)  Comment:   Plan: HIV Antigen Antibody Combo, PSA, screen,         Comprehensive metabolic panel (BMP + Alb, Alk         Phos, ALT, AST, Total. Bili, TP), Lipid panel         reflex to direct LDL Fasting, CBC with         platelets and differential        Routine preventative care discussed.  Advised with diet, exercise,  Advised patient to remain active.  Up-to-date with his colon cancer screening.  Prostate cancer screening today.  1 year annual exam follow-up recommended.  3 to 4-month recommended for other chronic medical conditions.    (Z11.4) Screening for HIV (human immunodeficiency virus)  Comment:   Plan: screening    (G25.81) Restless legs syndrome  (RLS)  Comment:   Plan: carbidopa-levodopa (SINEMET)  MG tablet        Continue with current medications    (M54.50,  G89.29) Chronic left-sided low back pain without sciatica  Comment:   Plan: cyclobenzaprine (FLEXERIL) 5 MG tablet,         DISCONTINUED: cyclobenzaprine (FLEXERIL) 5 MG         tablet        I did discuss with the patient if he is willing to try any other medication, such as gabapentin, Lyrica, duloxetine.  In addition, continue with his hydrocodone with a tapered dose slowly.  Discussed with the patient will be difficult to continue with the same current  medication, and dosage.  Discussed with patient the current guidelines do not support to continue using opioid for chronic pain syndrome.  Patient has declined any other intervention.  He requested to have all his medications, 100 tab per month, or no other medication or no other reduction.  In addition, I did discuss the referral to pain management in which he has declined.    Discussed with patient regarding withdrawal effect if he quit taking or stop taking the medication.  Discussed with patient.  Advised on any withdrawal symptoms to go to the ER or call the clinic    (J45.30) Asthma, chronic, mild persistent, uncomplicated  Comment:   Plan: fluticasone (FLONASE) 50 MCG/ACT nasal spray        Stable, continue with current medications  Advised to quite smoking    (M51.37) Degeneration of lumbar or lumbosacral intervertebral disc  Comment:   Plan:     (M54.2) Cervicalgia  Comment:   Plan:     (G89.4) Chronic pain syndrome  Comment:   Plan: as above    (F41.8) Mixed anxiety depressive disorder  Comment:   Plan: hydrOXYzine (VISTARIL) 25 MG capsule        Stale, uses as needed    (E78.5) Hyperlipidemia with target LDL less than 130  Comment:   Plan: simvastatin (ZOCOR) 10 MG tablet            (R21) Rash  Comment:   Plan: triamcinolone (KENALOG) 0.1 % external cream            (E55.9) Vitamin D deficiency  Comment:   Plan: Cholecalciferol  "(VITAMIN D) 50 MCG ( UT)         CAPS            Patient has been advised of split billing requirements and indicates understanding: Yes  COUNSELING:   Reviewed preventive health counseling, as reflected in patient instructions       Regular exercise       Healthy diet/nutrition       Vision screening       Consider Hep C screening for all patients one time for ages 18-79 years       HIV screeninx in teen years, 1x in adult years, and at intervals if high risk       Prostate cancer screening    Estimated body mass index is 34.66 kg/m  as calculated from the following:    Height as of 20: 1.683 m (5' 6.25\").    Weight as of 20: 98.2 kg (216 lb 6.4 oz).     Weight management plan: Discussed healthy diet and exercise guidelines    He reports that he has been smoking cigarettes. He has been smoking about 1.00 pack per day. He has never used smokeless tobacco.  Tobacco Cessation Action Plan:   Self help information given to patient  was on Zyban and Nicotine gums and patches.      Counseling Resources:  ATP IV Guidelines  Pooled Cohorts Equation Calculator  FRAX Risk Assessment  ICSI Preventive Guidelines  Dietary Guidelines for Americans,   USDA's MyPlate  ASA Prophylaxis  Lung CA Screening    Rebecca Aguirre MD  [facility]  "

## 2021-12-21 ENCOUNTER — TELEPHONE (OUTPATIENT)
Dept: INTERNAL MEDICINE | Facility: CLINIC | Age: 66
End: 2021-12-21
Payer: COMMERCIAL

## 2021-12-21 LAB
ALBUMIN SERPL-MCNC: 4.4 G/DL (ref 3.4–5)
ALP SERPL-CCNC: 66 U/L (ref 40–150)
ALT SERPL W P-5'-P-CCNC: 45 U/L (ref 0–70)
ANION GAP SERPL CALCULATED.3IONS-SCNC: 5 MMOL/L (ref 3–14)
AST SERPL W P-5'-P-CCNC: 25 U/L (ref 0–45)
BILIRUB SERPL-MCNC: 0.5 MG/DL (ref 0.2–1.3)
BUN SERPL-MCNC: 16 MG/DL (ref 7–30)
CALCIUM SERPL-MCNC: 9.5 MG/DL (ref 8.5–10.1)
CHLORIDE BLD-SCNC: 103 MMOL/L (ref 94–109)
CO2 SERPL-SCNC: 29 MMOL/L (ref 20–32)
CREAT SERPL-MCNC: 0.66 MG/DL (ref 0.66–1.25)
GFR SERPL CREATININE-BSD FRML MDRD: >90 ML/MIN/1.73M2
GLUCOSE BLD-MCNC: 111 MG/DL (ref 70–99)
POTASSIUM BLD-SCNC: 4.3 MMOL/L (ref 3.4–5.3)
PROT SERPL-MCNC: 8 G/DL (ref 6.8–8.8)
PSA SERPL-MCNC: 2.32 UG/L (ref 0–4)
SODIUM SERPL-SCNC: 137 MMOL/L (ref 133–144)

## 2021-12-21 NOTE — TELEPHONE ENCOUNTER
Patient calling regarding lab levels.  Labs were reported as normal, only slightly decreased.    Patient should increase healthy food options, fresh fruits and vegetables, reports diet of sandwiches only. He will call back if he has further questions.       Iman Wan RN

## 2021-12-22 LAB
CHOLEST SERPL-MCNC: 192 MG/DL
FASTING STATUS PATIENT QL REPORTED: NO
HDLC SERPL-MCNC: 85 MG/DL
LDLC SERPL CALC-MCNC: 76 MG/DL
NONHDLC SERPL-MCNC: 107 MG/DL
TRIGL SERPL-MCNC: 153 MG/DL

## 2022-10-16 ENCOUNTER — HEALTH MAINTENANCE LETTER (OUTPATIENT)
Age: 67
End: 2022-10-16